# Patient Record
Sex: MALE | Race: WHITE | Employment: OTHER | URBAN - METROPOLITAN AREA
[De-identification: names, ages, dates, MRNs, and addresses within clinical notes are randomized per-mention and may not be internally consistent; named-entity substitution may affect disease eponyms.]

---

## 2019-09-10 ENCOUNTER — HOSPITAL ENCOUNTER (OUTPATIENT)
Age: 84
Discharge: HOME | End: 2019-09-12
Payer: COMMERCIAL

## 2019-09-10 LAB — PROSTATE SPECIFIC ANTIGEN: 9.42 NG/ML (ref 0–4)

## 2019-09-10 PROCEDURE — G0103 PSA SCREENING: HCPCS

## 2019-10-11 ENCOUNTER — HOSPITAL ENCOUNTER (OUTPATIENT)
Age: 84
Discharge: HOME | End: 2019-10-13
Payer: COMMERCIAL

## 2019-10-11 PROCEDURE — 87186 SC STD MICRODIL/AGAR DIL: CPT

## 2019-10-11 PROCEDURE — 87088 URINE BACTERIA CULTURE: CPT

## 2019-10-11 PROCEDURE — 87077 CULTURE AEROBIC IDENTIFY: CPT

## 2019-10-13 LAB
ORGANISM: ABNORMAL
URINE CULTURE, ROUTINE: ABNORMAL

## 2020-07-27 ENCOUNTER — APPOINTMENT (OUTPATIENT)
Dept: GENERAL RADIOLOGY | Age: 85
DRG: 291 | End: 2020-07-27
Payer: MEDICARE

## 2020-07-27 ENCOUNTER — APPOINTMENT (OUTPATIENT)
Dept: CT IMAGING | Age: 85
DRG: 291 | End: 2020-07-27
Payer: MEDICARE

## 2020-07-27 ENCOUNTER — HOSPITAL ENCOUNTER (INPATIENT)
Age: 85
LOS: 8 days | Discharge: NURSING, REHAB OR LONG-TERM CARE | DRG: 291 | End: 2020-08-05
Attending: EMERGENCY MEDICINE | Admitting: INTERNAL MEDICINE
Payer: MEDICARE

## 2020-07-27 PROBLEM — I50.9 HF (HEART FAILURE) (HCC): Status: ACTIVE | Noted: 2020-07-27

## 2020-07-27 LAB
ACETAMINOPHEN LEVEL: <5 MCG/ML (ref 10–30)
ALBUMIN SERPL-MCNC: 3.5 G/DL (ref 3.5–5.2)
ALBUMIN SERPL-MCNC: 3.7 G/DL (ref 3.5–5.2)
ALP BLD-CCNC: 65 U/L (ref 40–129)
ALP BLD-CCNC: 73 U/L (ref 40–129)
ALT SERPL-CCNC: 11 U/L (ref 0–40)
ALT SERPL-CCNC: 11 U/L (ref 0–40)
ANION GAP SERPL CALCULATED.3IONS-SCNC: 10 MMOL/L (ref 7–16)
ANION GAP SERPL CALCULATED.3IONS-SCNC: 14 MMOL/L (ref 7–16)
APTT: 29.5 SEC (ref 24.5–35.1)
AST SERPL-CCNC: 15 U/L (ref 0–39)
AST SERPL-CCNC: 19 U/L (ref 0–39)
BACTERIA: ABNORMAL /HPF
BASOPHILS ABSOLUTE: 0.01 E9/L (ref 0–0.2)
BASOPHILS ABSOLUTE: 0.02 E9/L (ref 0–0.2)
BASOPHILS RELATIVE PERCENT: 0.2 % (ref 0–2)
BASOPHILS RELATIVE PERCENT: 0.5 % (ref 0–2)
BILIRUB SERPL-MCNC: 1.3 MG/DL (ref 0–1.2)
BILIRUB SERPL-MCNC: 1.4 MG/DL (ref 0–1.2)
BILIRUBIN URINE: NEGATIVE
BLOOD, URINE: ABNORMAL
BUN BLDV-MCNC: 31 MG/DL (ref 8–23)
BUN BLDV-MCNC: 36 MG/DL (ref 8–23)
CALCIUM SERPL-MCNC: 10.3 MG/DL (ref 8.6–10.2)
CALCIUM SERPL-MCNC: 10.5 MG/DL (ref 8.6–10.2)
CHLORIDE BLD-SCNC: 101 MMOL/L (ref 98–107)
CHLORIDE BLD-SCNC: 99 MMOL/L (ref 98–107)
CK MB: 2.6 NG/ML (ref 0–7.7)
CLARITY: ABNORMAL
CO2: 26 MMOL/L (ref 22–29)
CO2: 28 MMOL/L (ref 22–29)
COLOR: YELLOW
CREAT SERPL-MCNC: 1.3 MG/DL (ref 0.7–1.2)
CREAT SERPL-MCNC: 1.4 MG/DL (ref 0.7–1.2)
D DIMER: 1521 NG/ML DDU
EOSINOPHILS ABSOLUTE: 0.23 E9/L (ref 0.05–0.5)
EOSINOPHILS ABSOLUTE: 0.24 E9/L (ref 0.05–0.5)
EOSINOPHILS RELATIVE PERCENT: 5.3 % (ref 0–6)
EOSINOPHILS RELATIVE PERCENT: 5.3 % (ref 0–6)
ETHANOL: <10 MG/DL (ref 0–0.08)
GFR AFRICAN AMERICAN: 58
GFR AFRICAN AMERICAN: >60
GFR NON-AFRICAN AMERICAN: 48 ML/MIN/1.73
GFR NON-AFRICAN AMERICAN: 52 ML/MIN/1.73
GLUCOSE BLD-MCNC: 90 MG/DL (ref 74–99)
GLUCOSE BLD-MCNC: 90 MG/DL (ref 74–99)
GLUCOSE URINE: NEGATIVE MG/DL
HCT VFR BLD CALC: 34.6 % (ref 37–54)
HCT VFR BLD CALC: 38.2 % (ref 37–54)
HEMOGLOBIN: 10.8 G/DL (ref 12.5–16.5)
HEMOGLOBIN: 12.2 G/DL (ref 12.5–16.5)
IMMATURE GRANULOCYTES #: 0.02 E9/L
IMMATURE GRANULOCYTES #: 0.02 E9/L
IMMATURE GRANULOCYTES %: 0.4 % (ref 0–5)
IMMATURE GRANULOCYTES %: 0.5 % (ref 0–5)
INR BLD: 1.2
KETONES, URINE: NEGATIVE MG/DL
LEUKOCYTE ESTERASE, URINE: ABNORMAL
LYMPHOCYTES ABSOLUTE: 0.86 E9/L (ref 1.5–4)
LYMPHOCYTES ABSOLUTE: 0.99 E9/L (ref 1.5–4)
LYMPHOCYTES RELATIVE PERCENT: 19.8 % (ref 20–42)
LYMPHOCYTES RELATIVE PERCENT: 21.9 % (ref 20–42)
MCH RBC QN AUTO: 27.6 PG (ref 26–35)
MCH RBC QN AUTO: 28.1 PG (ref 26–35)
MCHC RBC AUTO-ENTMCNC: 31.2 % (ref 32–34.5)
MCHC RBC AUTO-ENTMCNC: 31.9 % (ref 32–34.5)
MCV RBC AUTO: 88 FL (ref 80–99.9)
MCV RBC AUTO: 88.3 FL (ref 80–99.9)
MONOCYTES ABSOLUTE: 0.55 E9/L (ref 0.1–0.95)
MONOCYTES ABSOLUTE: 0.62 E9/L (ref 0.1–0.95)
MONOCYTES RELATIVE PERCENT: 12.6 % (ref 2–12)
MONOCYTES RELATIVE PERCENT: 13.7 % (ref 2–12)
NEUTROPHILS ABSOLUTE: 2.64 E9/L (ref 1.8–7.3)
NEUTROPHILS ABSOLUTE: 2.67 E9/L (ref 1.8–7.3)
NEUTROPHILS RELATIVE PERCENT: 58.5 % (ref 43–80)
NEUTROPHILS RELATIVE PERCENT: 61.3 % (ref 43–80)
NITRITE, URINE: POSITIVE
PDW BLD-RTO: 15.7 FL (ref 11.5–15)
PDW BLD-RTO: 15.9 FL (ref 11.5–15)
PH UA: 7 (ref 5–9)
PLATELET # BLD: 169 E9/L (ref 130–450)
PLATELET # BLD: 189 E9/L (ref 130–450)
PMV BLD AUTO: 8.7 FL (ref 7–12)
PMV BLD AUTO: 8.7 FL (ref 7–12)
POTASSIUM REFLEX MAGNESIUM: 4.1 MMOL/L (ref 3.5–5)
POTASSIUM REFLEX MAGNESIUM: 4.5 MMOL/L (ref 3.5–5)
PRO-BNP: 4876 PG/ML (ref 0–450)
PROCALCITONIN: 0.08 NG/ML (ref 0–0.08)
PROTEIN UA: ABNORMAL MG/DL
PROTHROMBIN TIME: 14 SEC (ref 9.3–12.4)
RBC # BLD: 3.92 E12/L (ref 3.8–5.8)
RBC # BLD: 4.34 E12/L (ref 3.8–5.8)
RBC UA: ABNORMAL /HPF (ref 0–2)
SALICYLATE, SERUM: <0.3 MG/DL (ref 0–30)
SODIUM BLD-SCNC: 139 MMOL/L (ref 132–146)
SODIUM BLD-SCNC: 139 MMOL/L (ref 132–146)
SPECIFIC GRAVITY UA: 1.01 (ref 1–1.03)
TOTAL PROTEIN: 6.3 G/DL (ref 6.4–8.3)
TOTAL PROTEIN: 6.7 G/DL (ref 6.4–8.3)
TRICYCLIC ANTIDEPRESSANTS SCREEN SERUM: NEGATIVE NG/ML
TROPONIN: 0.02 NG/ML (ref 0–0.03)
TROPONIN: 0.03 NG/ML (ref 0–0.03)
TSH SERPL DL<=0.05 MIU/L-ACNC: 1.88 UIU/ML (ref 0.27–4.2)
UROBILINOGEN, URINE: 1 E.U./DL
WBC # BLD: 4.4 E9/L (ref 4.5–11.5)
WBC # BLD: 4.5 E9/L (ref 4.5–11.5)
WBC UA: >20 /HPF (ref 0–5)

## 2020-07-27 PROCEDURE — 96376 TX/PRO/DX INJ SAME DRUG ADON: CPT

## 2020-07-27 PROCEDURE — 6370000000 HC RX 637 (ALT 250 FOR IP): Performed by: EMERGENCY MEDICINE

## 2020-07-27 PROCEDURE — 82553 CREATINE MB FRACTION: CPT

## 2020-07-27 PROCEDURE — 2580000003 HC RX 258: Performed by: STUDENT IN AN ORGANIZED HEALTH CARE EDUCATION/TRAINING PROGRAM

## 2020-07-27 PROCEDURE — 84443 ASSAY THYROID STIM HORMONE: CPT

## 2020-07-27 PROCEDURE — 6370000000 HC RX 637 (ALT 250 FOR IP): Performed by: STUDENT IN AN ORGANIZED HEALTH CARE EDUCATION/TRAINING PROGRAM

## 2020-07-27 PROCEDURE — 80053 COMPREHEN METABOLIC PANEL: CPT

## 2020-07-27 PROCEDURE — 99285 EMERGENCY DEPT VISIT HI MDM: CPT

## 2020-07-27 PROCEDURE — 73562 X-RAY EXAM OF KNEE 3: CPT

## 2020-07-27 PROCEDURE — 71045 X-RAY EXAM CHEST 1 VIEW: CPT

## 2020-07-27 PROCEDURE — 83880 ASSAY OF NATRIURETIC PEPTIDE: CPT

## 2020-07-27 PROCEDURE — 85378 FIBRIN DEGRADE SEMIQUANT: CPT

## 2020-07-27 PROCEDURE — 81001 URINALYSIS AUTO W/SCOPE: CPT

## 2020-07-27 PROCEDURE — 80307 DRUG TEST PRSMV CHEM ANLYZR: CPT

## 2020-07-27 PROCEDURE — 2580000003 HC RX 258: Performed by: RADIOLOGY

## 2020-07-27 PROCEDURE — 84484 ASSAY OF TROPONIN QUANT: CPT

## 2020-07-27 PROCEDURE — 2580000003 HC RX 258: Performed by: EMERGENCY MEDICINE

## 2020-07-27 PROCEDURE — 85730 THROMBOPLASTIN TIME PARTIAL: CPT

## 2020-07-27 PROCEDURE — 93005 ELECTROCARDIOGRAM TRACING: CPT | Performed by: EMERGENCY MEDICINE

## 2020-07-27 PROCEDURE — 74177 CT ABD & PELVIS W/CONTRAST: CPT

## 2020-07-27 PROCEDURE — 6360000004 HC RX CONTRAST MEDICATION: Performed by: RADIOLOGY

## 2020-07-27 PROCEDURE — G0378 HOSPITAL OBSERVATION PER HR: HCPCS

## 2020-07-27 PROCEDURE — 6360000002 HC RX W HCPCS: Performed by: EMERGENCY MEDICINE

## 2020-07-27 PROCEDURE — 85025 COMPLETE CBC W/AUTO DIFF WBC: CPT

## 2020-07-27 PROCEDURE — 36415 COLL VENOUS BLD VENIPUNCTURE: CPT

## 2020-07-27 PROCEDURE — 96375 TX/PRO/DX INJ NEW DRUG ADDON: CPT

## 2020-07-27 PROCEDURE — 85610 PROTHROMBIN TIME: CPT

## 2020-07-27 PROCEDURE — 96374 THER/PROPH/DIAG INJ IV PUSH: CPT

## 2020-07-27 PROCEDURE — 84145 PROCALCITONIN (PCT): CPT

## 2020-07-27 PROCEDURE — 6360000002 HC RX W HCPCS: Performed by: STUDENT IN AN ORGANIZED HEALTH CARE EDUCATION/TRAINING PROGRAM

## 2020-07-27 PROCEDURE — 71275 CT ANGIOGRAPHY CHEST: CPT

## 2020-07-27 PROCEDURE — G0480 DRUG TEST DEF 1-7 CLASSES: HCPCS

## 2020-07-27 RX ORDER — PANTOPRAZOLE SODIUM 40 MG/1
40 TABLET, DELAYED RELEASE ORAL
Status: DISCONTINUED | OUTPATIENT
Start: 2020-07-28 | End: 2020-08-05 | Stop reason: HOSPADM

## 2020-07-27 RX ORDER — FOLIC ACID 1 MG/1
1 TABLET ORAL DAILY
Status: DISCONTINUED | OUTPATIENT
Start: 2020-07-27 | End: 2020-08-05 | Stop reason: HOSPADM

## 2020-07-27 RX ORDER — FUROSEMIDE 10 MG/ML
20 INJECTION INTRAMUSCULAR; INTRAVENOUS ONCE
Status: COMPLETED | OUTPATIENT
Start: 2020-07-27 | End: 2020-07-27

## 2020-07-27 RX ORDER — SODIUM CHLORIDE 0.9 % (FLUSH) 0.9 %
10 SYRINGE (ML) INJECTION EVERY 12 HOURS SCHEDULED
Status: DISCONTINUED | OUTPATIENT
Start: 2020-07-27 | End: 2020-08-05 | Stop reason: HOSPADM

## 2020-07-27 RX ORDER — PROMETHAZINE HYDROCHLORIDE 25 MG/1
12.5 TABLET ORAL EVERY 6 HOURS PRN
Status: DISCONTINUED | OUTPATIENT
Start: 2020-07-27 | End: 2020-08-05 | Stop reason: HOSPADM

## 2020-07-27 RX ORDER — ASPIRIN 81 MG/1
81 TABLET ORAL DAILY
Status: DISCONTINUED | OUTPATIENT
Start: 2020-07-27 | End: 2020-08-05 | Stop reason: HOSPADM

## 2020-07-27 RX ORDER — ONDANSETRON 2 MG/ML
4 INJECTION INTRAMUSCULAR; INTRAVENOUS EVERY 6 HOURS PRN
Status: DISCONTINUED | OUTPATIENT
Start: 2020-07-27 | End: 2020-08-05 | Stop reason: HOSPADM

## 2020-07-27 RX ORDER — CEFTRIAXONE 1 G/1
1 INJECTION, POWDER, FOR SOLUTION INTRAMUSCULAR; INTRAVENOUS DAILY
Status: DISCONTINUED | OUTPATIENT
Start: 2020-07-28 | End: 2020-07-27 | Stop reason: SDUPTHER

## 2020-07-27 RX ORDER — DILTIAZEM HYDROCHLORIDE 180 MG/1
180 CAPSULE, COATED, EXTENDED RELEASE ORAL DAILY
Status: DISCONTINUED | OUTPATIENT
Start: 2020-07-27 | End: 2020-07-28

## 2020-07-27 RX ORDER — SODIUM CHLORIDE 0.9 % (FLUSH) 0.9 %
10 SYRINGE (ML) INJECTION PRN
Status: DISCONTINUED | OUTPATIENT
Start: 2020-07-27 | End: 2020-07-27 | Stop reason: SDUPTHER

## 2020-07-27 RX ORDER — LANOLIN ALCOHOL/MO/W.PET/CERES
1000 CREAM (GRAM) TOPICAL DAILY
Status: DISCONTINUED | OUTPATIENT
Start: 2020-07-27 | End: 2020-08-05 | Stop reason: HOSPADM

## 2020-07-27 RX ORDER — SODIUM CHLORIDE 0.9 % (FLUSH) 0.9 %
10 SYRINGE (ML) INJECTION PRN
Status: DISCONTINUED | OUTPATIENT
Start: 2020-07-27 | End: 2020-08-05 | Stop reason: HOSPADM

## 2020-07-27 RX ORDER — ROSUVASTATIN CALCIUM 5 MG/1
5 TABLET, COATED ORAL NIGHTLY
Status: DISCONTINUED | OUTPATIENT
Start: 2020-07-27 | End: 2020-08-05 | Stop reason: HOSPADM

## 2020-07-27 RX ORDER — DILTIAZEM HYDROCHLORIDE 60 MG/1
180 CAPSULE, EXTENDED RELEASE ORAL DAILY
Status: DISCONTINUED | OUTPATIENT
Start: 2020-07-27 | End: 2020-07-27 | Stop reason: SDUPTHER

## 2020-07-27 RX ORDER — POLYETHYLENE GLYCOL 3350 17 G/17G
17 POWDER, FOR SOLUTION ORAL DAILY PRN
Status: DISCONTINUED | OUTPATIENT
Start: 2020-07-27 | End: 2020-08-05 | Stop reason: HOSPADM

## 2020-07-27 RX ORDER — 0.9 % SODIUM CHLORIDE 0.9 %
1000 INTRAVENOUS SOLUTION INTRAVENOUS ONCE
Status: COMPLETED | OUTPATIENT
Start: 2020-07-27 | End: 2020-07-27

## 2020-07-27 RX ORDER — ACETAMINOPHEN 325 MG/1
650 TABLET ORAL EVERY 6 HOURS PRN
Status: DISCONTINUED | OUTPATIENT
Start: 2020-07-27 | End: 2020-08-05 | Stop reason: HOSPADM

## 2020-07-27 RX ORDER — ASPIRIN 81 MG/1
324 TABLET, CHEWABLE ORAL ONCE
Status: COMPLETED | OUTPATIENT
Start: 2020-07-27 | End: 2020-07-27

## 2020-07-27 RX ORDER — ACETAMINOPHEN 650 MG/1
650 SUPPOSITORY RECTAL EVERY 6 HOURS PRN
Status: DISCONTINUED | OUTPATIENT
Start: 2020-07-27 | End: 2020-08-05 | Stop reason: HOSPADM

## 2020-07-27 RX ORDER — TAMSULOSIN HYDROCHLORIDE 0.4 MG/1
0.4 CAPSULE ORAL DAILY
Status: DISCONTINUED | OUTPATIENT
Start: 2020-07-27 | End: 2020-08-05 | Stop reason: HOSPADM

## 2020-07-27 RX ADMIN — SODIUM CHLORIDE, PRESERVATIVE FREE 10 ML: 5 INJECTION INTRAVENOUS at 20:32

## 2020-07-27 RX ADMIN — FUROSEMIDE 20 MG: 10 INJECTION, SOLUTION INTRAMUSCULAR; INTRAVENOUS at 11:06

## 2020-07-27 RX ADMIN — CEFTRIAXONE SODIUM 1 G: 1 INJECTION, POWDER, FOR SOLUTION INTRAMUSCULAR; INTRAVENOUS at 11:07

## 2020-07-27 RX ADMIN — IOPAMIDOL 110 ML: 755 INJECTION, SOLUTION INTRAVENOUS at 10:04

## 2020-07-27 RX ADMIN — FUROSEMIDE 20 MG: 10 INJECTION, SOLUTION INTRAVENOUS at 18:42

## 2020-07-27 RX ADMIN — ROSUVASTATIN CALCIUM 5 MG: 5 TABLET, FILM COATED ORAL at 20:32

## 2020-07-27 RX ADMIN — ASPIRIN 81 MG CHEWABLE TABLET 324 MG: 81 TABLET CHEWABLE at 11:08

## 2020-07-27 RX ADMIN — SODIUM CHLORIDE 1000 ML: 9 INJECTION, SOLUTION INTRAVENOUS at 11:04

## 2020-07-27 RX ADMIN — LIDOCAINE HYDROCHLORIDE: 20 SOLUTION ORAL; TOPICAL at 18:42

## 2020-07-27 RX ADMIN — SODIUM CHLORIDE, PRESERVATIVE FREE 10 ML: 5 INJECTION INTRAVENOUS at 18:42

## 2020-07-27 RX ADMIN — ASPIRIN 81 MG: 81 TABLET, COATED ORAL at 18:42

## 2020-07-27 RX ADMIN — FOLIC ACID 1 MG: 1 TABLET ORAL at 18:42

## 2020-07-27 RX ADMIN — TAMSULOSIN HYDROCHLORIDE 0.4 MG: 0.4 CAPSULE ORAL at 18:42

## 2020-07-27 RX ADMIN — Medication 1000 MCG: at 18:42

## 2020-07-27 ASSESSMENT — ENCOUNTER SYMPTOMS
NAUSEA: 0
CONSTIPATION: 0
FACIAL SWELLING: 0
EYE PAIN: 0
VOMITING: 0
PHOTOPHOBIA: 0
CHEST TIGHTNESS: 0
ALLERGIC/IMMUNOLOGIC NEGATIVE: 1
ABDOMINAL PAIN: 0
SORE THROAT: 0
SHORTNESS OF BREATH: 1
EYE REDNESS: 0
DIARRHEA: 0

## 2020-07-27 ASSESSMENT — PAIN DESCRIPTION - DESCRIPTORS: DESCRIPTORS: PATIENT UNABLE TO DESCRIBE

## 2020-07-27 ASSESSMENT — PAIN SCALES - GENERAL
PAINLEVEL_OUTOF10: 0
PAINLEVEL_OUTOF10: 0
PAINLEVEL_OUTOF10: 10

## 2020-07-27 ASSESSMENT — PAIN DESCRIPTION - LOCATION: LOCATION: GENERALIZED

## 2020-07-27 NOTE — H&P
[facility]  Internal Medicine Residency Program  History and Physical    Patient:  Moreno Andujar 80 y.o. male MRN: [id number]     Date of Service: 7/27/2020    Hospital Day: 1      Chief complaint: SOB and lower legs edema. History of Present Illness   The patient is a 80 y.o. male with PMHx of Afib, HTN, HLD, anemia, lymphoma presented to the ED complains of SOB and legs edema. Patient reports exertional dyspnea over a month but worsening for last couple days. Patient states that he only can walk a few steps before get short of breath. Patient does not have to use pillow to prop himself up at night, he also denies SOB during the night. Patient does cough sometimes and brings up yellow sputum yesterday, he denies hemoptysis. This morning patient reports feeling fever but he did not have temperature at home. Patient also reports bilateral legs swelling for several months worsening recently. He has chest tightness for several months when he walks outside that relieved with rest. Also he reports felling dizzy when he sit up from the bed, no spinning sensation. Patient lost 20 pounds over 6 months. He also has black starry stool. He denies headache, palpitation, chills or changes in urinary habits. ED Course: Patient alert and awake but feeling tired, oriented x3. /51 mmHg, O2 saturation 95% on room air. Lab result: Sodium 139, potassium 4.5, bicarb 28, creatinine 1.4, BUN 36; ProBNP 4876, Troponin 0.03, CBC shows mild anemia of 10.8, no WBC elevated. - EKG: Atrial fibrillation; Prolonged QT  - CTA chest: Large right and moderate sized left pleural effusion, no sign of pulmonary emboli. - U/A: positive for nitrite, leukocytes, bacteria  ED Meds: Patient was given Rocephine 1g, Lasix 20 mg, aspirin 324mg. ED Fluids: Patient was given 1000 ml Normal saline.     Past Medical History:      Diagnosis Date    Anemia     Atrial fibrillation (HCC)     Hyperlipidemia     Hypertension  [facility] [address]     Renal failure, acute ([facility] [address]     Temporal arteritis ([facility] [address]        Past Surgical History:        Procedure Laterality Date    CATARACT REMOVAL Bilateral     CORONARY ANGIOPLASTY WITH STENT PLACEMENT  2009    HERNIA REPAIR Right     PTCA  7/28/2009    stent to mid LAD lesion    ROTATOR CUFF REPAIR Bilateral        Medications Prior to Admission:    Prior to Admission medications    Medication Sig Start Date End Date Taking? Authorizing Provider   esomeprazole (NEXIUM) 40 MG delayed release capsule Take 1 capsule by mouth every morning (before breakfast) 10/31/19   Kinza Flores MD   albuterol sulfate  (90 Base) MCG/ACT inhaler Inhale 2 puffs into the lungs 4 times daily as needed for Wheezing 10/31/19   Kinza Flores MD   rosuvastatin (CRESTOR) 10 MG tablet Take 5 mg by mouth daily. Indications: takes 1/2 twice a week    Historical Provider, MD   CINNAMON PO Take 1,000 mg by mouth daily. Historical Provider, MD   vitamin B-12 (CYANOCOBALAMIN) 1000 MCG tablet Take 1,000 mcg by mouth daily. Historical Provider, MD   folic acid (FOLVITE) 1 MG tablet Take 1 mg by mouth daily. Historical Provider, MD   salsalate (DISALCID) 750 MG tablet Take 750 mg by mouth 3 times daily. Historical Provider, MD   metoprolol (TOPROL-XL) 50 MG XL tablet Take 50 mg by mouth 2 times daily. Historical Provider, MD   WARFARIN SODIUM PO Take  by mouth daily. [facility]  Currently 3 mg    Historical Provider, MD   nitroGLYCERIN (NITROSTAT) 0.4 MG SL tablet Place 0.4 mg under the tongue every 5 minutes as needed for Chest pain. Historical Provider, MD   Coenzyme Q10 (COQ10 PO) Take 1 capsule by mouth daily. Historical Provider, MD   aspirin 81 MG EC tablet Take 81 mg by mouth daily. Historical Provider, MD   Omega-3 Fatty Acids (FISH OIL) 1000 MG CAPS Take 1,000 mg by mouth daily. Historical Provider, MD   tamsulosin (FLOMAX) 0.4 MG capsule Take 0.4 mg by mouth daily. no thyromegaly or thyroid nodules, no cervical lymphadenopathy  · Pulmonary/Chest: Decrease breath sound over lung base bilaterally  · Cardiovascular: normal rate Irregular rhythm, normal S1 and S2, no murmurs, rubs, clicks, or gallops  · Abdomen: soft, non-tender, non-distended, normal bowel sounds, no masses or organomegaly.  There is ulceration over sacral region  · Extremities: bilateral lower extremities 3+ edema  · Musculoskeletal: Tender on palpation over left calf  · Neurologic: reflexes normal and symmetric, no cranial nerve deficit, gait, coordination and speech normal   Labs and Imaging Studies   Basic Labs  Recent Labs     20  0848      K 4.5      CO2 28   BUN 36*   CREATININE 1.4*   GLUCOSE 90   CALCIUM 10.3*       Recent Labs     20  0848   WBC 4.5   RBC 3.92   HGB 10.8*   HCT 34.6*   MCV 88.3   MCH 27.6   MCHC 31.2*   RDW 15.9*      MPV 8.7       CBC:   Lab Results   Component Value Date    WBC 4.5 2020    RBC 3.92 2020    HGB 10.8 2020    HCT 34.6 2020    MCV 88.3 2020    RDW 15.9 2020     2020     CMP:  Lab Results   Component Value Date     2020    K 4.5 2020     2020    CO2 28 2020    BUN 36 2020    PROT 6.3 2020     U/A:  No components found for: Sepideh Butter, USPGRAV, UPH, UPROTEIN, UGLUCOSE, UKETONE, UBILI, UBLOOD, UNITRITE, UUROBIL, ULEUKEST, USQEPI, URENEPI, UWBC, URBC, Synchari, [place]  PT/INR:  No results found for: PTINR  PTT:  No results found for: APTT  HgBA1c:  No components found for: HGBA1C  Iron Studies:  No results found for: TIBC, FERRITIN  VITAMIN B12: No components found for: B12  FOLATE:  No results found for: FOLATE    Imaging Studies:     Xr Knee Left (3 Views)    Result Date: 2020  Patient MRN:  [id number] : 1931 Age: 80 years Gender: Male Order Date:  2020 8:30 AM EXAM: XR KNEE LEFT (3 VIEWS) INDICATION:  knEE pAIN knEE pAIN COMPARISON: None FINDINGS:  There is mild compartmental narrowing medially and small medially oriented spurs. There is mild degenerative changes patellofemoral compartment. There are no fractures. There is no joint effusion. Mild degenerative changes     Ct Abdomen Pelvis W Iv Contrast Additional Contrast? None    Result Date: 2020  Patient MRN:  [id number] : 1931 Age: 80 years Gender: Male Order Date:  2020 9:00 AM EXAM: CTA CHEST W CONTRAST, CT ABDOMEN PELVIS W IV CONTRAST Dosage: 2009 mGY-cm Contrast: 110 mL Isovue-370 3-D postprocessing performed INDICATION:  Pain Pain COMPARISON: None FINDINGS:  There are small mediastinal and paratracheal lymph nodes. There are small left hilar and right infrahilar nodes. There is a moderate amount of calcified plaque in the great vessels. There is a moderate amount of coronary arterial vascular plaque. There is moderate to large right and moderate left pleural effusions. There is no evidence of pulmonary emboli. There are some groundglass opacities in left upper lobe. There is moderate bibasilar infiltrates. There is some groundglass opacities in the right upper lobe. Liver is normal. There is a tiny peripheral cyst. There are gallstones. Spleen and pancreas are normal. There is a 3.1 cm lesion posteriorly left kidney with solid and cystic components. There is moderate atheromatous plaque in the abdominal aorta and iliac vessels. No abdominal or pelvic lymphadenopathy or fluid collections are noted. There may be some debris within the bladder. There is slight retrolisthesis of L2 with respect to L3 with degenerative changes. Large right and moderate sized left pleural effusion  and significant bibasilar infiltrates worse on the right. Findings are nonspecific but could reflect congestive heart failure. No evidence of pulmonary emboli. Some paratracheal and small mediastinal lymph nodes. Lesion posteriorly in the left kidney.  Although this could reflect a hemorrhagic cyst, a complex solid and cystic mass is not excluded. Recommend follow-up ultrasound. Low-attenuation zones in the bladder which could reflect debris. This can be further evaluated with ultrasound as well    Xr Chest Portable    Result Date: 2020  Patient MRN:  [id number] : 1931 Age: 80 years Gender: Male Order Date:  2020 8:30 AM EXAM: XR CHEST PORTABLE INDICATION:  sob sob COMPARISON: None FINDINGS:  Heart is mildly enlarged. There are moderate-sized bilateral pleural effusions. They are larger on the right. There are no obvious infiltrates. CHF     Cta Chest W Contrast    Result Date: 2020  Patient MRN:  [id number] : 1931 Age: 80 years Gender: Male Order Date:  2020 9:00 AM EXAM: CTA CHEST W CONTRAST, CT ABDOMEN PELVIS W IV CONTRAST Dosage: 2009 mGY-cm Contrast: 110 mL Isovue-370 3-D postprocessing performed INDICATION:  Pain Pain COMPARISON: None FINDINGS:  There are small mediastinal and paratracheal lymph nodes. There are small left hilar and right infrahilar nodes. There is a moderate amount of calcified plaque in the great vessels. There is a moderate amount of coronary arterial vascular plaque. There is moderate to large right and moderate left pleural effusions. There is no evidence of pulmonary emboli. There are some groundglass opacities in left upper lobe. There is moderate bibasilar infiltrates. There is some groundglass opacities in the right upper lobe. Liver is normal. There is a tiny peripheral cyst. There are gallstones. Spleen and pancreas are normal. There is a 3.1 cm lesion posteriorly left kidney with solid and cystic components. There is moderate atheromatous plaque in the abdominal aorta and iliac vessels. No abdominal or pelvic lymphadenopathy or fluid collections are noted. There may be some debris within the bladder. There is slight retrolisthesis of L2 with respect to L3 with degenerative changes.      Large right and moderate sized left pleural effusion  and significant bibasilar infiltrates worse on the right. Findings are nonspecific but could reflect congestive heart failure. No evidence of pulmonary emboli. Some paratracheal and small mediastinal lymph nodes. Lesion posteriorly in the left kidney. Although this could reflect a hemorrhagic cyst, a complex solid and cystic mass is not excluded. Recommend follow-up ultrasound. Low-attenuation zones in the bladder which could reflect debris. This can be further evaluated with ultrasound as well      EKG: atrial fibrillation, rate 97    Resident's Assessment and Plan     Maximiliano Both is a 80 y.o. male with PHMx of Afib, HTN, HLD, anemia, lymphoma presented to the ED complains of SOB and legs edema. Cardiology:   1. Exertional dyspnea 2/2 Acute on chronic congestive heart failure vs Acute MI vs Pulmonary emboli  - Patient has Hx of HTN, Afib with cardioversion present to the Ed with SOB, bilateral lower legs swelling.   - Bilateral pleural effusion on physical exam and on CTA chest.   - Pro BNP on admission is elevated ( 4876)   - Troponin on admission 0.03, EKG shows no ST changes  - CTA chest show no evidence of pulmonary emboli  · Start IV Lasix  · ECHO   · Follow vitals, Troponin trending, spO2, CXR, check TSH and Pro-BNP  2. Hx of Afib, currently on Diltiazem 180 mg and Eliquis daily. 3. Hyperlipidemia  - On rosuvastatin 5mg daily. Pulmonary:    1. Community acquired pneumonia  - Patient presents with symptoms of cough up yellow sputum, SOB, patient has low fever at home; physical exam and imaging shows bilateral pleural effusion.  - CBC shows WBC in normal range( 4.5). - Plan: recheck CBC with WBC count, check CRP, pro calcitonin, CXR  - Send blood culture, respiratory culture. - Start antibiotics according to the result. - Follow up vitals. Nephrology (Fluids/ Electrolytes & Nutrition):   - Creatinine 1.4 on admission  - Plan: Check urine electrolytes, check FeNa. Black starry stool  - Patient reports having black starry stools, CBC shows mild normocytic anemia  - Plan: POCT occult blood stool, Order iron studies.   - Hold apixaban for now. Hematology/ Oncology:    Hx of lymphoma  Infectious Disease:   UTI   - U/A shows positive for WBC, nitrites, bacteria  - Currently on rocephin 1g  - Send urine culture. Musculoskeletal:   1. Left calf pain likely DVT. - Patient has left calf pain, swelling of lower extremities. - Well scores of 2.  - Plan: Check D- Dimer. DVT ppx: Currently on Eliquis  GI ppx: Protonix       Sangita Montez MD, PGY-1   Attending physician: Dr. Jaziel Minaya. Senior Resident Statement  I have seen and examined the patient with the intern. I have discussed the case, including pertinent history and exam findings with the intern. I agree with the assessment, plan and orders as documented by the intern. I have also discussed the plan with the attending on call, Dr. Jaziel Minaya. Patient is presenting with SOB, dizziness and bilateral LE edema. His symptoms have been generally chronic but worsened over the last few days. Will do ischemic cardiac workup including complete echo and cardiology consult. He will need a tress test but will defer to Cardiology. Will diurese given his heart failure presentation and bilateral pleural effusion. Regarding his pleural effusion, will monitor response to diuresis, consult pulmonology for their opinion and further recommendation. Will follow his effusion with CXR. It might need to be drained and sent for analysis. He stated that he has a history of black stool, compliant on apixaban will hold for now pending further assessment for bleeding. Remainder of medical problems as per intern note above.       Miki Delacruz M.D., PGY 2    Attending physician: Dr. Maribel Ngo,

## 2020-07-27 NOTE — ED NOTES
Spoke to anabelle on admitted floorand pt assigned bed is in process of being cleaned.      Nimo Velazquez RN  07/27/20 [id number]

## 2020-07-27 NOTE — ED PROVIDER NOTES
ATTENDING PROVIDER ATTESTATION:     Dmitriy Nolasco presented to the emergency department for evaluation of Other (patient has multiple complaints for months. ... MULTIPLE, was supposed to get a upper and lower scope today but didn't go)   and was initially evaluated by the Medical Resident. See Original ED Note for H&P and ED course above. I have reviewed and discussed the case, including pertinent history (medical, surgical, family and social) and exam findings with the Medical Resident assigned to Dmitriy Nolasco. I have personally performed and/or participated in the history, exam, medical decision making, and procedures and agree with all pertinent clinical information and any additional changes or corrections are noted below in my assessment and plan. I have discussed this patient in detail with the resident, and provided the instruction and education,       I have reviewed my findings and recommendations with the assigned Medical Resident, Dmitriy Nolasco and members of family present at the time of disposition. I have performed a history and physical examination of this patient and directly supervised the resident caring for this patient      History of Present Illness:    Presents to the ED for multiple complaints, beginning years ago. The complaint has been persistent, moderate in severity, and worsened by nothing. Patient reports that he has had multiple complaints for many months to years. He reports that he has been tired, having orthopnea, having exertional dyspnea and becoming more tired. He reports he is supposed to have upper and lower scopes today but canceled because he did not feel well and was more tired. He reports low-grade temps at home as well. He denies chills. He denies any history of heart failure. He does report bilateral peripheral edema and swelling. He says he has had intermittent chest pain for months as well.         Review of Systems:   Pertinent positives and negatives are stated within HPI, all other systems reviewed and are negative.    --------------------------------------------- PAST HISTORY ---------------------------------------------  Past Medical History:  has a past medical history of Anemia, Atrial fibrillation ([facility] [address] Hyperlipidemia, Hypertension, Lymphoma ([facility] [address] Renal failure, acute ([facility] [address] and [facility] Past Surgical History:  has a past surgical history that includes Percutaneous Transluminal Coronary Angio (7/28/2009); Rotator cuff repair (Bilateral); Coronary angioplasty with stent (2009); Cataract removal (Bilateral); and hernia repair (Right). Social History:  reports that he quit smoking about 45 years ago. His smoking use included cigars. He quit after 25.00 years of use. He quit smokeless tobacco use about 47 years ago. He reports that he does not drink alcohol or use drugs. Family History: family history is not on file. Unless otherwise noted, family history is non contributory    The patients home medications have been reviewed. Allergies: Zocor [simvastatin]      Physical Exam:  Constitutional/General: Alert and oriented x3  Head: Normocephalic and atraumatic  Eyes: PERRL, EOMI, sclera non icteric  Mouth: Oropharynx clear, handling secretions  Neck: Supple, full ROM, no stridor, no meningeal signs  Respiratory: Lungs with decreased breath sounds bilaterally and rales bilaterally. Not in respiratory distress  Cardiovascular:  Irregularly irregular, No murmurs, no aortic murmurs, no gallops, or rubs. 2+ distal pulses. Equal extremity pulses. GI:  Abdomen Soft, Non tender, Non distended. No rebound, guarding, or rigidity. No pulsatile masses. Musculoskeletal: Moves all extremities x 4. Warm and well perfused,   1+ pitting edema bilaterally. Palpable peripheral pulses  Integument: skin warm and dry. No rashes.    Neurologic: GCS 15, no focal deficits  Psychiatric: Normal Affect      I directly supervised any procedures performed by the resident and was present for the procedure including all critical portions of the procedure      I, Dr. Thalia Loaiza, am the primary provider of record    My Medical Decision Making:          Labs and imaging suggestive of heart failure  He denies hx of this  This is likely why he is short of breath and and feeling poorly  Medicine consulted for admission        1.  Congestive heart failure, unspecified HF chronicity, unspecified heart failure type [facility])           Tono Torres MD  07/27/20 [id number]

## 2020-07-27 NOTE — ED PROVIDER NOTES
Patient is a 26-year-old male presented emergency department with fatigue, chest pain, lower extremity swelling, shortness of breath, left knee pain. Patient states his symptoms been worse in the last 2 weeks. Patient denies any fever, chills. He is tachycardic at a rate of 101 upon arrival with a blood pressure 115/51. He is satting 95% on room air. Symptoms are worsened by ambulating   symptoms are improved by nothing    Denies any associated nausea, vomiting, diarrhea    Review of Systems   Constitutional: Positive for fatigue. Negative for chills and fever. HENT: Negative for congestion, facial swelling, hearing loss and sore throat. Eyes: Negative for photophobia, pain and redness. Respiratory: Positive for shortness of breath. Negative for chest tightness. Cardiovascular: Positive for chest pain, palpitations and leg swelling. Gastrointestinal: Negative for abdominal pain, constipation, diarrhea, nausea and vomiting. Endocrine: Negative for cold intolerance, polydipsia and polyuria. Genitourinary: Negative for dysuria, flank pain and frequency. Musculoskeletal: Negative for arthralgias, joint swelling and myalgias. Skin: Negative. Allergic/Immunologic: Negative. Physical Exam  Vitals signs and nursing note reviewed. Constitutional:       Appearance: He is well-developed. He is ill-appearing. HENT:      Head: Normocephalic and atraumatic. Right Ear: Tympanic membrane normal.      Left Ear: Tympanic membrane normal.      Nose: Nose normal. No congestion. Mouth/Throat:      Mouth: Mucous membranes are moist.      Pharynx: Oropharynx is clear. Eyes:      Pupils: Pupils are equal, round, and reactive to light. Neck:      Musculoskeletal: Normal range of motion and neck supple. Cardiovascular:      Rate and Rhythm: Regular rhythm. Tachycardia present. Heart sounds: Normal heart sounds. No murmur.    Pulmonary:      Effort: Pulmonary effort is normal. No respiratory distress. Breath sounds: Wheezing and rhonchi present. No rales. Abdominal:      General: Bowel sounds are normal.      Palpations: Abdomen is soft. Tenderness: There is no abdominal tenderness. There is no guarding or rebound. Musculoskeletal:         General: Tenderness (Left knee pain) present. Right lower leg: Edema present. Left lower leg: Edema present. Skin:     General: Skin is warm and dry. Neurological:      Mental Status: He is alert and oriented to person, place, and time. Cranial Nerves: No cranial nerve deficit. Coordination: Coordination normal.          Procedures     EKG: This EKG is signed and interpreted by me. Rate: 97  Rhythm: Atrial fibrillation  Interpretation: atrial fibrillation (chronic)  Comparison: stable as compared to patient's most recent EKG       MDM  Number of Diagnoses or Management Options  Diagnosis management comments: Patient found to have new onset heart failure. He also has ongoing chest pains. CT imaging of the chest revealed bilateral pleural effusions. Patient denies any fever, chills does have a urinary tract infection which she will be treated with Rocephin.          --------------------------------------------- PAST HISTORY ---------------------------------------------  Past Medical History:  has a past medical history of Anemia, Atrial fibrillation ([facility] Hyperlipidemia, Hypertension, Lymphoma ([facility] Ren[facility] failure, acute [facility] and Temporal arteritis ([facility] Past Surgical History:  has a past surgical history that includes Percutaneous Transluminal Coronary Angio (7/28/2009); Rotator cuff repair (Bilateral); Coronary angioplasty with stent (2009); Cataract removal (Bilateral); and hernia repair (Right). Social History:  reports that he quit smoking about 45 years ago. His smoking use included cigars. He quit after 25.00 years of use. He quit smokeless tobacco use about 47 years ago.  He reports that he does not drink alcohol or use drugs. Family History: family history is not on file. The patients home medications have been reviewed.     Allergies: Zocor [simvastatin]    -------------------------------------------------- RESULTS -------------------------------------------------    LABS:  Results for orders placed or performed during the hospital encounter of 07/27/20   CBC Auto Differential   Result Value Ref Range    WBC 4.5 4.5 - 11.5 E9/L    RBC 3.92 3.80 - 5.80 E12/L    Hemoglobin 10.8 (L) 12.5 - 16.5 g/dL    Hematocrit 34.6 (L) 37.0 - 54.0 %    MCV 88.3 80.0 - 99.9 fL    MCH 27.6 26.0 - 35.0 pg    MCHC 31.2 (L) 32.0 - 34.5 %    RDW 15.9 (H) 11.5 - 15.0 fL    Platelets 314 325 - 467 E9/L    MPV 8.7 7.0 - 12.0 fL    Neutrophils % 58.5 43.0 - 80.0 %    Immature Granulocytes % 0.4 0.0 - 5.0 %    Lymphocytes % 21.9 20.0 - 42.0 %    Monocytes % 13.7 (H) 2.0 - 12.0 %    Eosinophils % 5.3 0.0 - 6.0 %    Basophils % 0.2 0.0 - 2.0 %    Neutrophils Absolute 2.64 1.80 - 7.30 E9/L    Immature Granulocytes # 0.02 E9/L    Lymphocytes Absolute 0.99 (L) 1.50 - 4.00 E9/L    Monocytes Absolute 0.62 0.10 - 0.95 E9/L    Eosinophils Absolute 0.24 0.05 - 0.50 E9/L    Basophils Absolute 0.01 0.00 - 0.20 E9/L   Comprehensive Metabolic Panel w/ Reflex to MG   Result Value Ref Range    Sodium 139 132 - 146 mmol/L    Potassium reflex Magnesium 4.5 3.5 - 5.0 mmol/L    Chloride 101 98 - 107 mmol/L    CO2 28 22 - 29 mmol/L    Anion Gap 10 7 - 16 mmol/L    Glucose 90 74 - 99 mg/dL    BUN 36 (H) 8 - 23 mg/dL    CREATININE 1.4 (H) 0.7 - 1.2 mg/dL    GFR Non-African American 48 >=60 mL/min/1.73    GFR African American 58     Calcium 10.3 (H) 8.6 - 10.2 mg/dL    Total Protein 6.3 (L) 6.4 - 8.3 g/dL    Alb 3.5 3.5 - 5.2 g/dL    Total Bilirubin 1.4 (H) 0.0 - 1.2 mg/dL    Alkaline Phosphatase 65 40 - 129 U/L    ALT 11 0 - 40 U/L    AST 15 0 - 39 U/L   Troponin   Result Value Ref Range    Troponin 0.03 0.00 - 0.03 ng/mL   Brain Natriuretic Peptide   Result Value Ref Range    Pro-BNP 4,876 (H) 0 - 450 pg/mL   Urinalysis, reflex to microscopic   Result Value Ref Range    Color, UA Yellow Straw/Yellow    Clarity, UA SL CLOUDY Clear    Glucose, Ur Negative Negative mg/dL    Bilirubin Urine Negative Negative    Ketones, Urine Negative Negative mg/dL    Specific Gravity, UA 1.015 1.005 - 1.030    Blood, Urine TRACE-INTACT Negative    pH, UA 7.0 5.0 - 9.0    Protein, UA TRACE Negative mg/dL    Urobilinogen, Urine 1.0 <2.0 E.U./dL    Nitrite, Urine POSITIVE (A) Negative    Leukocyte Esterase, Urine LARGE (A) Negative   Microscopic Urinalysis   Result Value Ref Range    WBC, UA >20 (A) 0 - 5 /HPF    RBC, UA 0-1 0 - 2 /HPF    Bacteria, UA MODERATE (A) None Seen /HPF   EKG 12 Lead   Result Value Ref Range    Ventricular Rate 97 BPM    Atrial Rate 227 BPM    QRS Duration 88 ms    Q-T Interval 380 ms    QTc Calculation (Bazett) 482 ms    T [place] 32 degrees       RADIOLOGY:  CTA CHEST W CONTRAST   Final Result   Large right and moderate sized left pleural effusion  and significant   bibasilar infiltrates worse on the right. Findings are nonspecific but   could reflect congestive heart failure. No evidence of pulmonary emboli. Some paratracheal and small mediastinal lymph nodes. Lesion posteriorly in the left kidney. Although this could reflect a   hemorrhagic cyst, a complex solid and cystic mass is not excluded. Recommend follow-up ultrasound. Low-attenuation zones in the bladder which could reflect debris. This   can be further evaluated with ultrasound as well      CT ABDOMEN PELVIS W IV CONTRAST Additional Contrast? None   Final Result   Large right and moderate sized left pleural effusion  and significant   bibasilar infiltrates worse on the right. Findings are nonspecific but   could reflect congestive heart failure. No evidence of pulmonary emboli. Some paratracheal and small mediastinal lymph nodes.       Lesion posteriorly in the left kidney. Although this could reflect a   hemorrhagic cyst, a complex solid and cystic mass is not excluded. Recommend follow-up ultrasound. Low-attenuation zones in the bladder which could reflect debris. This   can be further evaluated with ultrasound as well      XR CHEST PORTABLE   Final Result   CHF               XR KNEE LEFT (3 VIEWS)   Final Result   Mild degenerative changes                         ------------------------- NURSING NOTES AND VITALS REVIEWED ---------------------------  Date / Time Roomed:  7/27/2020  8:13 AM  ED Bed Assignment:  14B/14B-14    The nursing notes within the ED encounter and vital signs as below have been reviewed. Patient Vitals for the past 24 hrs:   BP Temp Temp src Pulse Resp SpO2   07/27/20 0811 (!) 115/51 97.3 °F (36.3 °C) Temporal 100 17 95 %   07/27/20 0755 -- 99.3 °F (37.4 °C) Tympanic 92 -- 92 %       Oxygen Saturation Interpretation: Normal    ------------------------------------------ PROGRESS NOTES ------------------------------------------  Re-evaluation(s):  Time: 5724  Patients symptoms show no change  Repeat physical examination is not changed    Counseling:  I have spoken with the patient and discussed todays results, in addition to providing specific details for the plan of care and counseling regarding the diagnosis and prognosis. Their questions are answered at this time and they are agreeable with the plan of admission.    --------------------------------- ADDITIONAL PROVIDER NOTES ---------------------------------  Consultations:  Time: 1106. Spoke with Dr. Sofiya Bowie. Discussed case. They will admit the patient.   This patient's ED course included: a personal history and physicial examination, re-evaluation prior to disposition, multiple bedside re-evaluations, IV medications, cardiac monitoring, continuous pulse oximetry and complex medical decision making and emergency management    This patient has remained hemodynamically stable during their ED course. Diagnosis:  1. Congestive heart failure, unspecified HF chronicity, unspecified heart failure type ([facility]        Disposition:  Patient's disposition: Admit to telemetry  Patient's condition is stable.                    Meg Ahumada, DO  Resident  07/27/20 1134

## 2020-07-27 NOTE — PLAN OF CARE
Problem:  Activity:  Goal: Capacity to carry out activities will improve  Description: Capacity to carry out activities will improve  Outcome: Met This Shift  Goal: Will verbalize the importance of balancing activity with adequate rest periods  Description: Will verbalize the importance of balancing activity with adequate rest periods  Outcome: Met This Shift     Problem: Cardiac:  Goal: Hemodynamic stability will improve  Description: Hemodynamic stability will improve  Outcome: Met This Shift  Goal: Ability to maintain an adequate cardiac output will improve  Description: Ability to maintain an adequate cardiac output will improve  Outcome: Met This Shift     Problem: Fluid Volume:  Goal: Risk for excess fluid volume will decrease  Description: Risk for excess fluid volume will decrease  Outcome: Met This Shift  Goal: Maintenance of adequate hydration will improve  Description: Maintenance of adequate hydration will improve  Outcome: Met This Shift  Goal: Will show no signs and symptoms of electrolyte imbalance  Description: Will show no signs and symptoms of electrolyte imbalance  Outcome: Met This Shift

## 2020-07-28 ENCOUNTER — APPOINTMENT (OUTPATIENT)
Dept: ULTRASOUND IMAGING | Age: 85
DRG: 291 | End: 2020-07-28
Payer: MEDICARE

## 2020-07-28 ENCOUNTER — APPOINTMENT (OUTPATIENT)
Dept: GENERAL RADIOLOGY | Age: 85
DRG: 291 | End: 2020-07-28
Payer: MEDICARE

## 2020-07-28 PROBLEM — I50.31 ACUTE DIASTOLIC HEART FAILURE WITH PRESERVED EJECTION FRACTION (HCC): Status: ACTIVE | Noted: 2020-07-28

## 2020-07-28 LAB
ADENOVIRUS BY PCR: NOT DETECTED
ALBUMIN SERPL-MCNC: 3.6 G/DL (ref 3.5–5.2)
ALP BLD-CCNC: 71 U/L (ref 40–129)
ALT SERPL-CCNC: 10 U/L (ref 0–40)
AMPHETAMINE SCREEN, URINE: NOT DETECTED
ANION GAP SERPL CALCULATED.3IONS-SCNC: 13 MMOL/L (ref 7–16)
AST SERPL-CCNC: 15 U/L (ref 0–39)
BARBITURATE SCREEN URINE: NOT DETECTED
BASOPHILS ABSOLUTE: 0.02 E9/L (ref 0–0.2)
BASOPHILS RELATIVE PERCENT: 0.4 % (ref 0–2)
BENZODIAZEPINE SCREEN, URINE: NOT DETECTED
BILIRUB SERPL-MCNC: 1 MG/DL (ref 0–1.2)
BORDETELLA PARAPERTUSSIS BY PCR: NOT DETECTED
BORDETELLA PERTUSSIS BY PCR: NOT DETECTED
BUN BLDV-MCNC: 32 MG/DL (ref 8–23)
CALCIUM SERPL-MCNC: 10 MG/DL (ref 8.6–10.2)
CANNABINOID SCREEN URINE: NOT DETECTED
CHLAMYDOPHILIA PNEUMONIAE BY PCR: NOT DETECTED
CHLORIDE BLD-SCNC: 98 MMOL/L (ref 98–107)
CHLORIDE URINE RANDOM: 113 MMOL/L
CK MB: 2.1 NG/ML (ref 0–7.7)
CO2: 27 MMOL/L (ref 22–29)
COCAINE METABOLITE SCREEN URINE: NOT DETECTED
CORONAVIRUS 229E BY PCR: NOT DETECTED
CORONAVIRUS HKU1 BY PCR: NOT DETECTED
CORONAVIRUS NL63 BY PCR: NOT DETECTED
CORONAVIRUS OC43 BY PCR: NOT DETECTED
CREAT SERPL-MCNC: 1.4 MG/DL (ref 0.7–1.2)
CREATININE URINE: 40 MG/DL (ref 40–278)
EKG ATRIAL RATE: 227 BPM
EKG ATRIAL RATE: 89 BPM
EKG Q-T INTERVAL: 340 MS
EKG Q-T INTERVAL: 380 MS
EKG QRS DURATION: 72 MS
EKG QRS DURATION: 88 MS
EKG QTC CALCULATION (BAZETT): 468 MS
EKG QTC CALCULATION (BAZETT): 482 MS
EKG R AXIS: 11 DEGREES
EKG T AXIS: 28 DEGREES
EKG T AXIS: 32 DEGREES
EKG VENTRICULAR RATE: 114 BPM
EKG VENTRICULAR RATE: 97 BPM
EOSINOPHILS ABSOLUTE: 0.25 E9/L (ref 0.05–0.5)
EOSINOPHILS RELATIVE PERCENT: 5.4 % (ref 0–6)
FENTANYL SCREEN, URINE: NOT DETECTED
GFR AFRICAN AMERICAN: 58
GFR NON-AFRICAN AMERICAN: 48 ML/MIN/1.73
GLUCOSE BLD-MCNC: 110 MG/DL (ref 74–99)
HCT VFR BLD CALC: 35.4 % (ref 37–54)
HEMOGLOBIN: 11.4 G/DL (ref 12.5–16.5)
HUMAN METAPNEUMOVIRUS BY PCR: NOT DETECTED
HUMAN RHINOVIRUS/ENTEROVIRUS BY PCR: NOT DETECTED
IMMATURE GRANULOCYTES #: 0.02 E9/L
IMMATURE GRANULOCYTES %: 0.4 % (ref 0–5)
INFLUENZA A BY PCR: NOT DETECTED
INFLUENZA B BY PCR: NOT DETECTED
IRON SATURATION: 20 % (ref 20–55)
IRON: 37 MCG/DL (ref 59–158)
LV EF: 63 %
LVEF MODALITY: NORMAL
LYMPHOCYTES ABSOLUTE: 0.89 E9/L (ref 1.5–4)
LYMPHOCYTES RELATIVE PERCENT: 19.3 % (ref 20–42)
Lab: NORMAL
MCH RBC QN AUTO: 27.6 PG (ref 26–35)
MCHC RBC AUTO-ENTMCNC: 32.2 % (ref 32–34.5)
MCV RBC AUTO: 85.7 FL (ref 80–99.9)
METHADONE SCREEN, URINE: NOT DETECTED
MONOCYTES ABSOLUTE: 0.55 E9/L (ref 0.1–0.95)
MONOCYTES RELATIVE PERCENT: 11.9 % (ref 2–12)
MYCOPLASMA PNEUMONIAE BY PCR: NOT DETECTED
NEUTROPHILS ABSOLUTE: 2.88 E9/L (ref 1.8–7.3)
NEUTROPHILS RELATIVE PERCENT: 62.6 % (ref 43–80)
OPIATE SCREEN URINE: NOT DETECTED
OXYCODONE URINE: NOT DETECTED
PARAINFLUENZA VIRUS 1 BY PCR: NOT DETECTED
PARAINFLUENZA VIRUS 2 BY PCR: NOT DETECTED
PARAINFLUENZA VIRUS 3 BY PCR: NOT DETECTED
PARAINFLUENZA VIRUS 4 BY PCR: NOT DETECTED
PDW BLD-RTO: 15.6 FL (ref 11.5–15)
PHENCYCLIDINE SCREEN URINE: NOT DETECTED
PLATELET # BLD: 192 E9/L (ref 130–450)
PMV BLD AUTO: 9 FL (ref 7–12)
POTASSIUM REFLEX MAGNESIUM: 4.3 MMOL/L (ref 3.5–5)
POTASSIUM, UR: 22.2 MMOL/L
RBC # BLD: 4.13 E12/L (ref 3.8–5.8)
RESPIRATORY SYNCYTIAL VIRUS BY PCR: NOT DETECTED
SODIUM BLD-SCNC: 138 MMOL/L (ref 132–146)
SODIUM URINE: 129 MMOL/L
TOTAL IRON BINDING CAPACITY: 185 MCG/DL (ref 250–450)
TOTAL PROTEIN: 6.2 G/DL (ref 6.4–8.3)
TROPONIN: 0.01 NG/ML (ref 0–0.03)
TROPONIN: 0.03 NG/ML (ref 0–0.03)
WBC # BLD: 4.6 E9/L (ref 4.5–11.5)

## 2020-07-28 PROCEDURE — 2580000003 HC RX 258: Performed by: STUDENT IN AN ORGANIZED HEALTH CARE EDUCATION/TRAINING PROGRAM

## 2020-07-28 PROCEDURE — 82570 ASSAY OF URINE CREATININE: CPT

## 2020-07-28 PROCEDURE — 99205 OFFICE O/P NEW HI 60 MIN: CPT | Performed by: INTERNAL MEDICINE

## 2020-07-28 PROCEDURE — 99024 POSTOP FOLLOW-UP VISIT: CPT | Performed by: INTERNAL MEDICINE

## 2020-07-28 PROCEDURE — 2140000000 HC CCU INTERMEDIATE R&B

## 2020-07-28 PROCEDURE — 36415 COLL VENOUS BLD VENIPUNCTURE: CPT

## 2020-07-28 PROCEDURE — 6360000002 HC RX W HCPCS: Performed by: STUDENT IN AN ORGANIZED HEALTH CARE EDUCATION/TRAINING PROGRAM

## 2020-07-28 PROCEDURE — 85025 COMPLETE CBC W/AUTO DIFF WBC: CPT

## 2020-07-28 PROCEDURE — 6370000000 HC RX 637 (ALT 250 FOR IP): Performed by: STUDENT IN AN ORGANIZED HEALTH CARE EDUCATION/TRAINING PROGRAM

## 2020-07-28 PROCEDURE — 6370000000 HC RX 637 (ALT 250 FOR IP): Performed by: INTERNAL MEDICINE

## 2020-07-28 PROCEDURE — 99223 1ST HOSP IP/OBS HIGH 75: CPT | Performed by: INTERNAL MEDICINE

## 2020-07-28 PROCEDURE — 87088 URINE BACTERIA CULTURE: CPT

## 2020-07-28 PROCEDURE — 80053 COMPREHEN METABOLIC PANEL: CPT

## 2020-07-28 PROCEDURE — 84133 ASSAY OF URINE POTASSIUM: CPT

## 2020-07-28 PROCEDURE — 0100U HC RESPIRPTHGN MULT REV TRANS & AMP PRB TECH 21 TRGT: CPT

## 2020-07-28 PROCEDURE — 93306 TTE W/DOPPLER COMPLETE: CPT

## 2020-07-28 PROCEDURE — 82553 CREATINE MB FRACTION: CPT

## 2020-07-28 PROCEDURE — 83540 ASSAY OF IRON: CPT

## 2020-07-28 PROCEDURE — 84484 ASSAY OF TROPONIN QUANT: CPT

## 2020-07-28 PROCEDURE — 2580000003 HC RX 258: Performed by: RADIOLOGY

## 2020-07-28 PROCEDURE — 71045 X-RAY EXAM CHEST 1 VIEW: CPT

## 2020-07-28 PROCEDURE — 93970 EXTREMITY STUDY: CPT

## 2020-07-28 PROCEDURE — 96376 TX/PRO/DX INJ SAME DRUG ADON: CPT

## 2020-07-28 PROCEDURE — 80307 DRUG TEST PRSMV CHEM ANLYZR: CPT

## 2020-07-28 PROCEDURE — 83550 IRON BINDING TEST: CPT

## 2020-07-28 PROCEDURE — 6360000002 HC RX W HCPCS: Performed by: INTERNAL MEDICINE

## 2020-07-28 PROCEDURE — 93005 ELECTROCARDIOGRAM TRACING: CPT | Performed by: STUDENT IN AN ORGANIZED HEALTH CARE EDUCATION/TRAINING PROGRAM

## 2020-07-28 PROCEDURE — 82436 ASSAY OF URINE CHLORIDE: CPT

## 2020-07-28 PROCEDURE — 93010 ELECTROCARDIOGRAM REPORT: CPT | Performed by: INTERNAL MEDICINE

## 2020-07-28 PROCEDURE — 84300 ASSAY OF URINE SODIUM: CPT

## 2020-07-28 PROCEDURE — APPSS45 APP SPLIT SHARED TIME 31-45 MINUTES: Performed by: PHYSICIAN ASSISTANT

## 2020-07-28 RX ORDER — FUROSEMIDE 10 MG/ML
20 INJECTION INTRAMUSCULAR; INTRAVENOUS 2 TIMES DAILY
Status: DISCONTINUED | OUTPATIENT
Start: 2020-07-28 | End: 2020-07-30

## 2020-07-28 RX ORDER — METOPROLOL SUCCINATE 25 MG/1
25 TABLET, EXTENDED RELEASE ORAL 2 TIMES DAILY
Status: DISCONTINUED | OUTPATIENT
Start: 2020-07-28 | End: 2020-07-29

## 2020-07-28 RX ORDER — DILTIAZEM HYDROCHLORIDE 60 MG/1
60 CAPSULE, EXTENDED RELEASE ORAL 2 TIMES DAILY
Status: DISCONTINUED | OUTPATIENT
Start: 2020-07-28 | End: 2020-07-28

## 2020-07-28 RX ORDER — FUROSEMIDE 10 MG/ML
40 INJECTION INTRAMUSCULAR; INTRAVENOUS ONCE
Status: COMPLETED | OUTPATIENT
Start: 2020-07-28 | End: 2020-07-28

## 2020-07-28 RX ORDER — FUROSEMIDE 10 MG/ML
20 INJECTION INTRAMUSCULAR; INTRAVENOUS ONCE
Status: DISCONTINUED | OUTPATIENT
Start: 2020-07-28 | End: 2020-07-28

## 2020-07-28 RX ORDER — POTASSIUM CHLORIDE 20 MEQ/1
20 TABLET, EXTENDED RELEASE ORAL 2 TIMES DAILY WITH MEALS
Status: DISCONTINUED | OUTPATIENT
Start: 2020-07-28 | End: 2020-08-05 | Stop reason: HOSPADM

## 2020-07-28 RX ADMIN — METOPROLOL SUCCINATE 25 MG: 25 TABLET, EXTENDED RELEASE ORAL at 16:54

## 2020-07-28 RX ADMIN — PANTOPRAZOLE SODIUM 40 MG: 40 TABLET, DELAYED RELEASE ORAL at 06:26

## 2020-07-28 RX ADMIN — FUROSEMIDE 40 MG: 10 INJECTION, SOLUTION INTRAMUSCULAR; INTRAVENOUS at 11:31

## 2020-07-28 RX ADMIN — SODIUM CHLORIDE, PRESERVATIVE FREE 10 ML: 5 INJECTION INTRAVENOUS at 11:35

## 2020-07-28 RX ADMIN — Medication 1000 MCG: at 09:49

## 2020-07-28 RX ADMIN — TAMSULOSIN HYDROCHLORIDE 0.4 MG: 0.4 CAPSULE ORAL at 09:48

## 2020-07-28 RX ADMIN — ASPIRIN 81 MG: 81 TABLET, COATED ORAL at 09:49

## 2020-07-28 RX ADMIN — FOLIC ACID 1 MG: 1 TABLET ORAL at 09:48

## 2020-07-28 RX ADMIN — SODIUM CHLORIDE, PRESERVATIVE FREE 10 ML: 5 INJECTION INTRAVENOUS at 09:51

## 2020-07-28 RX ADMIN — CEFTRIAXONE SODIUM 1 G: 1 INJECTION, POWDER, FOR SOLUTION INTRAMUSCULAR; INTRAVENOUS at 09:49

## 2020-07-28 RX ADMIN — ROSUVASTATIN CALCIUM 5 MG: 5 TABLET, FILM COATED ORAL at 22:43

## 2020-07-28 RX ADMIN — SODIUM CHLORIDE, PRESERVATIVE FREE 10 ML: 5 INJECTION INTRAVENOUS at 22:42

## 2020-07-28 RX ADMIN — POTASSIUM CHLORIDE 20 MEQ: 1500 TABLET, EXTENDED RELEASE ORAL at 22:43

## 2020-07-28 RX ADMIN — FUROSEMIDE 20 MG: 10 INJECTION, SOLUTION INTRAMUSCULAR; INTRAVENOUS at 22:43

## 2020-07-28 ASSESSMENT — PAIN DESCRIPTION - LOCATION
LOCATION: GENERALIZED;BACK;NECK
LOCATION: LEG

## 2020-07-28 ASSESSMENT — PAIN DESCRIPTION - DESCRIPTORS
DESCRIPTORS: CONSTANT;ACHING
DESCRIPTORS: ACHING;CONSTANT

## 2020-07-28 ASSESSMENT — PAIN DESCRIPTION - PAIN TYPE
TYPE: CHRONIC PAIN
TYPE: CHRONIC PAIN

## 2020-07-28 ASSESSMENT — PAIN - FUNCTIONAL ASSESSMENT
PAIN_FUNCTIONAL_ASSESSMENT: PREVENTS OR INTERFERES SOME ACTIVE ACTIVITIES AND ADLS
PAIN_FUNCTIONAL_ASSESSMENT: PREVENTS OR INTERFERES WITH MANY ACTIVE NOT PASSIVE ACTIVITIES

## 2020-07-28 ASSESSMENT — PAIN DESCRIPTION - ONSET
ONSET: ON-GOING
ONSET: ON-GOING

## 2020-07-28 ASSESSMENT — PAIN DESCRIPTION - PROGRESSION
CLINICAL_PROGRESSION: NOT CHANGED
CLINICAL_PROGRESSION: NOT CHANGED

## 2020-07-28 ASSESSMENT — PAIN DESCRIPTION - FREQUENCY
FREQUENCY: CONTINUOUS
FREQUENCY: CONTINUOUS

## 2020-07-28 ASSESSMENT — PAIN SCALES - GENERAL
PAINLEVEL_OUTOF10: 5
PAINLEVEL_OUTOF10: 8

## 2020-07-28 NOTE — CONSULTS
Pulmonary [facility]                             Pulmonary Consult/Progress Note :          Patient: Maximiliano Wilde  MRN: [id number]  : 1931      Date of Admission: .2020  8:13 AM    Consulting Physician:Bilateral Pleural effusion       Reason for Consultation:bilateral effusion   CC : sob   HPI:   Maximiliano Wilde is a 80y.o. year old male with PMHx of Afib,  lymphoma presented to the ED complains of SOB and legs edema. HE state he has been  exertional dyspnea over a month but worsening for last couple days. Patient states that he only can walk a few steps before get short of breath. He quit smoking 40 years ago and he smoke for more than 30 year and has about 30 PPY smoking history. Patient does not have to use pillow to prop himself up at night, he also denies SOB during the night. Patient does cough sometimes and brings up yellow sputum yesterday, he denies hemoptysis. This morning patient reports feeling fever but he did not have temperature at home. Patient also reports bilateral legs swelling for several months worsening recently. He has chest tightness for several months when he walks outside that relieved with rest. Also he reports felling dizzy when he sit up from the bed, no spinning sensation. Patient lost 20 pounds over 6 months. He also has black starry stool. He denies headache, palpitation, chills or changes in urinary habits.      ED Course: Patient alert and awake but feeling tired, oriented x3. /51 mmHg, O2 saturation 95% on room air. Lab result: Sodium 139, potassium 4.5, bicarb 28, creatinine 1.4, BUN 36; ProBNP 4876, Troponin 0.03, CBC shows mild anemia of 10.8, no WBC elevated. - EKG: Atrial fibrillation; Prolonged QT  - CTA chest: Large right and moderate sized left pleural effusion, no sign of pulmonary emboli.    - U/A: positive for nitrite, leukocytes, bacteria  ED Meds: Patient was given Rocephine 1g, Lasix 20 mg, aspirin 324mg. ED Fluids: Patient was given 1000 ml Normal saline. PAST MEDICAL HISTORY:   Past Medical History:   Diagnosis Date    Anemia     Atrial fibrillation ([facility]     Hyperlipidemia     Hypertension     Lymphoma ([facility]     Renal failure, acute ([facility]     Temporal arteritis ([facility]        PAST SURGICAL HISTORY:   Past Surgical History:   Procedure Laterality Date    CATARACT REMOVAL Bilateral     CORONARY ANGIOPLASTY WITH STENT PLACEMENT      HERNIA REPAIR Right     PTCA  2009    stent to mid LAD lesion    ROTATOR CUFF REPAIR Bilateral        FAMILY HISTORY:   No family history on file.     SOCIAL HISTORY:   Social History     Socioeconomic History    Marital status: Legally      Spouse name: Not on file    Number of children: Not on file    Years of education: Not on file    Highest education level: Not on file   Occupational History    Not on file   Social Needs    Financial resource strain: Not on file    Food insecurity     Worry: Not on file     Inability: Not on file    Transportation needs     Medical: Not on file     Non-medical: Not on file   Tobacco Use    Smoking status: Former Smoker     Years: 25.00     Types: Cigars     Last attempt to quit: 10/10/1974     Years since quittin.8    Smokeless tobacco: Former User     Quit date: 10/10/1972    Tobacco comment: 20 Cigars daily   Substance and Sexual Activity    Alcohol use: No     Comment: quit     Drug use: No    Sexual activity: Not on file   Lifestyle    Physical activity     Days per week: Not on file     Minutes per session: Not on file    Stress: Not on file   Relationships    Social connections     Talks on phone: Not on file     Gets together: Not on file     Attends Buddhist service: Not on file     Active member of club or organization: Not on file     Attends meetings of clubs or organizations: Not on file     Relationship status: Not on file    Intimate partner violence     Fear of current or ex partner: Not on file     Emotionally abused: Not on file     Physically abused: Not on file     Forced sexual activity: Not on file   Other Topics Concern    Not on file   Social History Narrative    Not on file     Social History     Tobacco Use   Smoking Status Former Smoker    Years: 25.00    Types: Cigars    Last attempt to quit: 10/10/1974    Years since quittin.8   Smokeless Tobacco Former User    Quit date: 10/10/1972   Tobacco Comment    20 Cigars daily     Social History     Substance and Sexual Activity   Alcohol Use No    Comment: quit      Social History     Substance and Sexual Activity   Drug Use No       OCCUPATIONAL HISTORY:            HOME MEDICATIONS:  Prior to Admission medications    Medication Sig Start Date End Date Taking? Authorizing Provider   esomeprazole (NEXIUM) 40 MG delayed release capsule Take 1 capsule by mouth every morning (before breakfast) 10/31/19  Yes Ramsey Reeves MD   rosuvastatin (CRESTOR) 10 MG tablet Take 5 mg by mouth daily. Indications: takes 1/2 twice a week   Yes Historical Provider, MD   vitamin B-12 (CYANOCOBALAMIN) 1000 MCG tablet Take 1,000 mcg by mouth daily. Yes Historical Provider, MD   folic acid (FOLVITE) 1 MG tablet Take 1 mg by mouth daily. Yes Historical Provider, MD   metoprolol (TOPROL-XL) 50 MG XL tablet Take 50 mg by mouth 2 times daily. Yes Historical Provider, MD   aspirin 81 MG EC tablet Take 81 mg by mouth daily. Yes Historical Provider, MD   tamsulosin (FLOMAX) 0.4 MG capsule Take 0.4 mg by mouth daily. Yes Historical Provider, MD   albuterol sulfate  (90 Base) MCG/ACT inhaler Inhale 2 puffs into the lungs 4 times daily as needed for Wheezing 10/31/19   Ramsey Reeves MD   CINNAMON PO Take 1,000 mg by mouth daily. Historical Provider, MD   salsalate (DISALCID) 750 MG tablet Take 750 mg by mouth 3 times daily. Historical Provider, MD   WARFARIN SODIUM PO Take  by mouth daily.  [facility] Currently 3 mg    Historical Provider, MD   nitroGLYCERIN (NITROSTAT) 0.4 MG SL tablet Place 0.4 mg under the tongue every 5 minutes as needed for Chest pain. Historical Provider, MD   Coenzyme Q10 (COQ10 PO) Take 1 capsule by mouth daily. Historical Provider, MD   Omega-3 Fatty Acids (FISH OIL) 1000 MG CAPS Take 1,000 mg by mouth daily.     Historical Provider, MD       CURRENT MEDICATIONS:  Current Facility-Administered Medications: metoprolol succinate (TOPROL XL) extended release tablet 25 mg, 25 mg, Oral, BID  perflutren lipid microspheres (DEFINITY) injection 1.65 mg, 1.5 mL, Intravenous, ONCE PRN  sodium chloride flush 0.9 % injection 10 mL, 10 mL, Intravenous, PRN  aspirin EC tablet 81 mg, 81 mg, Oral, Daily  pantoprazole (PROTONIX) tablet 40 mg, 40 mg, Oral, QAM AC  folic acid (FOLVITE) tablet 1 mg, 1 mg, Oral, Daily  rosuvastatin (CRESTOR) tablet 5 mg, 5 mg, Oral, Nightly  tamsulosin (FLOMAX) capsule 0.4 mg, 0.4 mg, Oral, Daily  vitamin B-12 (CYANOCOBALAMIN) tablet 1,000 mcg, 1,000 mcg, Oral, Daily  sodium chloride flush 0.9 % injection 10 mL, 10 mL, Intravenous, 2 times per day  acetaminophen (TYLENOL) tablet 650 mg, 650 mg, Oral, Q6H PRN **OR** acetaminophen (TYLENOL) suppository 650 mg, 650 mg, Rectal, Q6H PRN  polyethylene glycol (GLYCOLAX) packet 17 g, 17 g, Oral, Daily PRN  promethazine (PHENERGAN) tablet 12.5 mg, 12.5 mg, Oral, Q6H PRN **OR** ondansetron (ZOFRAN) injection 4 mg, 4 mg, Intravenous, Q6H PRN  cefTRIAXone (ROCEPHIN) 1 g in sterile water 10 mL IV syringe, 1 g, Intravenous, Q24H    IV MEDICATIONS:      ALLERGIES:  Allergies   Allergen Reactions    Zocor [Simvastatin] Other (See Comments)     Caused muscle aches       REVIEW OF SYSTEMS:  General ROS:  No weight loss ,no fatigue     ENT ROS:   No Sore throat ,no lymphoadenopathy,no nasal stuffiness     Hematological and Lymphatic ROS:   No ecchymosis ,no tendency to bleed  Respiratory ROS:   SOB   Cardiovascular ROS:   No CP,No Palpitation   Gastrointestinal ROS:   No Gi bleed,no nausea or vomiting      - Musculoskeletal ROS:      - no joint swelling ,no joint pain   Neurological ROS:     -no weakness or numbness    Dermatological ROS:   No skin rash ,no urticaria     PHYSICAL EXAMINATION:     VITAL SIGNS:  /74   Pulse 136   Temp 98.4 °F (36.9 °C) (Temporal)   Resp 16   Ht 6' (1.829 m)   Wt 213 lb (96.6 kg)   SpO2 98%   BMI 28.89 kg/m²   Wt Readings from Last 3 Encounters:   07/27/20 213 lb (96.6 kg)   10/31/19 220 lb (99.8 kg)   10/14/19 224 lb (101.6 kg)     Temp Readings from Last 3 Encounters:   07/28/20 98.4 °F (36.9 °C) (Temporal)     TMAX:  BP Readings from Last 3 Encounters:   07/28/20 116/74   10/31/19 118/60   10/14/19 120/68     Pulse Readings from Last 3 Encounters:   07/28/20 136   10/31/19 78   10/14/19 100           INTAKE/OUTPUTS:  I/O last 3 completed shifts:   In: 5 [P.O.:720]  Out: 3725 [Urine:3725]    Intake/Output Summary (Last 24 hours) at 7/28/2020 1656  Last data filed at 7/28/2020 1436  Gross per 24 hour   Intake 720 ml   Output 3725 ml   Net -3005 ml       General Appearance: alert and oriented to person, place and time, well-developed and   well-nourished, in no acute distress   Eyes: pupils equal, round, and reactive to light, extraocular eye movements intact, conjunctivae normal and sclera anicteric   Neck: neck supple and non tender without mass, no thyromegaly, no thyroid nodules and no cervical adenopathy   Pulmonary/Chest:decrease braeth sound bilateral    Cardiovascular: normal rate, regular rhythm, normal S1 and S2, no murmurs, rubs, clicks or gallops, distal pulses intact, no carotid bruits, no murmurs, no gallops, no carotid bruits and no JVD   Abdomen: obese, soft, non-tender, non-distended, normal bowel sounds, no masses or organomegaly   Extremities no edema   Musculoskeletal: normal range of motion, no joint swelling, deformity or tenderness   Neurologic: reflexes normal and symmetric, effusion Right more than left   2.)Medistinal adenopathy   3. )CHF   4.)Possible COPD   5. )VESTA/      PLAN:  *-will drain fluid when 24 h after you stop elquis 48 h  *-will send for flow cytometry  *-diuresis per primary  *-BD   *_NIMV if in any distress    To me seems CHF and need diuresis and lymph nodes can be related ,but given his history of lymphoma . will need to to proceed with EBUS or PET scan as OP         Thank you very much for allowing me to participate in the care of this pleasant patient , should you have any questions ,please do not hesitate to contact me      Dmitry Murguia MD,[facility]  Pulmonary&Critical Care Medicine   Director of [address] [place] [address] Director of [address] [place] [address]    Sarai Sales    NOTE: This report was transcribed using voice recognition software. Every effort was made to ensure accuracy; however, inadvertent computerized transcription errors may be present.

## 2020-07-28 NOTE — PROGRESS NOTES
Dr. Ashu Scruggs notified via perfect serve regarding new consult, added to care team at this time.     Iman Diaz RN

## 2020-07-28 NOTE — PROGRESS NOTES
[address] [facility] [address] [facility] notified via perfect serve regarding patient urine output since administration of Iv Lasix.     Hayes Tavarez RN

## 2020-07-28 NOTE — CARE COORDINATION
7/28 Update CM Note: Daughter to investigate skilled facilities in [place] for patient. Her choices in no order are [address] [facility] in [facility] in [place]. PT/OT aster pending. Will send referrals for evaluation and determination for skilled.  DESHAUN ROLON

## 2020-07-28 NOTE — PROGRESS NOTES
[facility]  Internal Medicine Residency Program  Progress Note - House Team 2    Patient:  Joan Omalley 80 y.o. male MRN: [id number]     Date of Service: 7/28/2020     CC: Sob and leg edema  Overnight events: Patient does not sleep well. This morning patient has 1 episode of hemoptysis. Patient states not having bowel movement for 3 days. Subjective     Patient feels SOB, O2 saturation 98% on room air. Patient denies chest pain, headache, palpitation, abdominal pain. Patient still has soreness over left calf and bilateral legs swelling. Objective     Physical Exam:  · Vitals: /75   Pulse 120   Temp 98.4 °F (36.9 °C) (Temporal)   Resp 16   Ht 6' (1.829 m)   Wt 213 lb (96.6 kg)   SpO2 98%   BMI 28.89 kg/m²     I & O - 24hr: I/O this shift: In: 480 [P.O.:480]  · Out: 1125 [Urine:1125]   · General Appearance: alert, appears stated age, cooperative and fatigued  · HEENT:  Head: Normocephalic, no lesions, without obvious abnormality. · Neck: no adenopathy, no carotid bruit, no JVD, supple, symmetrical, trachea midline and thyroid not enlarged, symmetric, no tenderness/mass/nodules  · Lung: diminished breath sounds bilaterally  · Heart: regular rate and rhythm, S1, S2 normal, no murmur, click, rub or gallop  · Abdomen: soft, non-tender; bowel sounds normal; no masses,  no organomegaly  · Extremities:  edema 2+ bilaterally  · Musculokeletal: No joint swelling, no muscle tenderness. ROM normal in all joints of extremities.    · Neurologic: Mental status: Alert, oriented, thought content appropriate  Subject  Pertinent Labs & Imaging Studies   rosemary  CBC with Differential:    Lab Results   Component Value Date    WBC 4.6 07/28/2020    RBC 4.13 07/28/2020    HGB 11.4 07/28/2020    HCT 35.4 07/28/2020     07/28/2020    MCV 85.7 07/28/2020    MCH 27.6 07/28/2020    MCHC 32.2 07/28/2020    RDW 15.6 07/28/2020    LYMPHOPCT 19.3 07/28/2020    MONOPCT 11.9 07/28/2020    BASOPCT 0.4 07/28/2020    MONOSABS 0.55 07/28/2020    LYMPHSABS 0.89 07/28/2020    EOSABS 0.25 07/28/2020    BASOSABS 0.02 07/28/2020     CMP:    Lab Results   Component Value Date     07/28/2020    K 4.3 07/28/2020    CL 98 07/28/2020    CO2 27 07/28/2020    BUN 32 07/28/2020    CREATININE 1.4 07/28/2020    GFRAA 58 07/28/2020    LABGLOM 48 07/28/2020    GLUCOSE 110 07/28/2020    PROT 6.2 07/28/2020    LABALBU 3.6 07/28/2020    CALCIUM 10.0 07/28/2020    BILITOT 1.0 07/28/2020    ALKPHOS 71 07/28/2020    AST 15 07/28/2020    ALT 10 07/28/2020     BUN/Creatinine:    Lab Results   Component Value Date    BUN 32 07/28/2020    CREATININE 1.4 07/28/2020     Albumin:    Lab Results   Component Value Date    LABALBU 3.6 07/28/2020     Ionized Calcium:  No results found for: IONCA  Magnesium:  No results found for: MG  Last 3 Troponin:    Lab Results   Component Value Date    TROPONINI 0.01 07/28/2020    TROPONINI 0.03 07/28/2020    TROPONINI 0.02 07/27/2020     U/A:    Lab Results   Component Value Date    COLORU Yellow 07/27/2020    PROTEINU TRACE 07/27/2020    PHUR 7.0 07/27/2020    WBCUA >20 07/27/2020    RBCUA 0-1 07/27/2020    BACTERIA MODERATE 07/27/2020    CLARITYU SL CLOUDY 07/27/2020    SPECGRAV 1.015 07/27/2020    LEUKOCYTESUR LARGE 07/27/2020    UROBILINOGEN 1.0 07/27/2020    BILIRUBINUR Negative 07/27/2020    BLOODU TRACE-INTACT 07/27/2020    GLUCOSEU Negative 07/27/2020     IRON:    Lab Results   Component Value Date    IRON 37 07/28/2020     Iron Saturation:  No components found for: PERCENTFE  TIBC:    Lab Results   Component Value Date    TIBC 185 07/28/2020     FERRITIN:  No results found for: FERRITIN    Resident's Assessment and Plan     Clarke Xie is a 80 y.o. male with PHMx of Afib, HTN, HLD, anemia, lymphoma presented to the ED complains of SOB and legs edema. CTA chest significant for bilateral pleural effusion, no evidence of PE. Troponin negative, Pro BNP elevated 4687.  Patient has 1 episode of hemoptysis this morning and also has Hx of black starry stool. 1. Exertional dyspnea 2/2 Acute on chronic congestive heart failure vs Acute MI vs Pulmonary emboli  - Patient has Hx of HTN, Afib with cardioversion present to the Ed with SOB, bilateral lower legs swelling.   - Bilateral pleural effusion on physical exam and on CTA chest.   - Pro BNP on admission is elevated ( 4876)   - Troponin on admission 0.03, EKG shows no ST changes. Patient has troponin negative x3 today  - CTA chest show no evidence of pulmonary emboli  - Plan today:   · Lasix 40mg IV. · Follow I/O, CXR, vitals. · Will follow up per cardiology. 2. Community acquired pneumonia  - Patient presents with symptoms of cough up yellow sputum, SOB, patient has low fever at home; physical exam and imaging shows bilateral pleural effusion.  - CBC shows WBC in normal range( 4.5). - Plan: recheck CBC with WBC count, check CRP, pro calcitonin, CXR  - Respiratory panel result negative, waiting for blood and res culture. - Start antibiotics according to the result. - Follow up vitals. Today: Patient has 1 episode of hemoptysis  - Hold eliquis and Pulmonology consulted regarding hemoptysis and pleural effusion.  - Follow up with Pulmonology  3. Nephrology (Fluids/ Electrolytes & Nutrition):   - Creatinine 1.4 x3   - Plan: Continue follow BMP, I/O.      4. Black starry stool  - Patient reports having black starry stools, CBC shows mild normocytic anemia  - Plan: POCT occult blood stool, Order iron studies.   - Iron studies result: Iron 37, TIBC 185.   - Hold apixaban for now. 5. Hx of Afib cardioverted, currently on Diltiazem 180 mg and Eliquis daily. 6. Hyperlipidemia  - On rosuvastatin 5mg daily. 7. Hematology/ Oncology:    Hx of lymphoma     8. UTI   - U/A shows positive for WBC, nitrites, bacteria  - Currently on rocephin 1g  - Send urine culture. 9. Musculoskeletal:   1. Left calf pain likely DVT.    - Patient has left calf pain, swelling of lower extremities.   - Well scores of 2.  - Plan: Check D- Dimer.      DVT ppx: Hold Eliquis  GI ppx: Protonix          Marcela Baltazar MD, PGY-1  Attending physician: Dr. Belem Brown DO.

## 2020-07-28 NOTE — PLAN OF CARE
Problem: Activity:  Goal: Capacity to carry out activities will improve  Description: Capacity to carry out activities will improve  7/27/2020 2209 by Herman Mason RN  Outcome: Met This Shift     Problem:  Activity:  Goal: Will verbalize the importance of balancing activity with adequate rest periods  Description: Will verbalize the importance of balancing activity with adequate rest periods  7/27/2020 2209 by Herman Mason RN  Outcome: Met This Shift     Problem: Cardiac:  Goal: Hemodynamic stability will improve  Description: Hemodynamic stability will improve  7/27/2020 2209 by Herman Mason RN  Outcome: Met This Shift     Problem: Cardiac:  Goal: Ability to maintain an adequate cardiac output will improve  Description: Ability to maintain an adequate cardiac output will improve  7/27/2020 2209 by Herman Mason RN  Outcome: Met This Shift     Problem: Fluid Volume:  Goal: Risk for excess fluid volume will decrease  Description: Risk for excess fluid volume will decrease  7/27/2020 2209 by Herman Mason RN  Outcome: Met This Shift     Problem: Fluid Volume:  Goal: Maintenance of adequate hydration will improve  Description: Maintenance of adequate hydration will improve  7/27/2020 2209 by Herman Mason RN  Outcome: Met This Shift     Problem: Fluid Volume:  Goal: Will show no signs and symptoms of electrolyte imbalance  Description: Will show no signs and symptoms of electrolyte imbalance  7/27/2020 2209 by Herman Mason RN  Outcome: Met This Shift

## 2020-07-28 NOTE — CONSULTS
I independently interviewed and examined the patient. I have reviewed the above documentation completed by the MALINA. Please see my additional contributions to the HPI, physical exam, and assessment / medical decision making. Reason for consult: CHF and A. Fib    He was previously known to Dr. Venkata Wesley and was also seen by Dr. Edward Rivas at [facility]) cardiology in 2016 and was also following at [facility]. Mr. Emelina Perez is a 40-year-old gentleman with history of permanent atrial fibrillation, cardioverted in 2005, 2006 and 2014, currently on Eliquis for anticoagulation, status post loop recorder in situ, CAD, status post PCI/LYN to mid LAD underwent 2009, hypertension, hyperlipidemia, non-Hodgkin's lymphoma, history of syncope, TIA, GERD/Ponce's esophagus and BPH who presented to the hospital with a 6-week history of fatigue and progressively increasing dyspnea. He had a Lexiscan nuclear stress test at [facility] in June 2018 which showed an EF of 55% inferior wall hypokinesis without ischemia. Transthoracic echo in June 2019 showed an EF of 55+/-5% moderate LVH, severe left atrial enlargement, mild MR and TR and moderate aortic regurgitation. He presented to the hospital with progressively increasing dyspnea and fatigue for the past 6 weeks but denies any orthopnea and paroxysmal nocturnal dyspnea. He also noticed bilateral leg edema and no significant weight gain. In fact, I he lost 25 pounds over the last 6 months. He also noticed black stools for the past 1 month and coughed up bright red blood today. His EKG showed atrial fibrillation with heart rate in the 90s. He had a CTA chest that was negative for PE but it showed large right and moderate sized left pleural effusion and significant bibasilar infiltrates worse on the right. Findings nonspecific but could reflect congestive heart failure also. No evidence of pulmonary embolism. Small mediastinal and paratracheal lymph nodes noted.   There is severely dilated, dilated aorta, moderate aortic insufficiency, mild MR and TR. Moderate concentric LVH. Per [facility] nuclear stress test-June 2018 ([facility]): LVEF 52%, there is resting perfusion abnormality in the inferior septal wall consistent with a prior infarction without any reversible perfusion defect. Inferior wall hypokinesis. Assessment:  · Shortness of breath multifactorial including diastolic heart failure, large bilateral pleural effusions. · Atrial fibrillation, permanent with rate control  · CAD status post PCI/LYN to mid LAD underwent 2009, now with history of inferior scar based on stress test done in 2018 at [facility]. · Hemoptysis, unclear etiology. · Hypertension, well controlled  · Hyperlipidemia on statin  · History of non-Hodgkin's lymphoma  · History of TIA  · GERD  · BPH  · UTI  · Melena rule out GI bleed with mild anemia      Plan:   · Will hold Eliquis for now due to hemoptysis  · Will btain an echocardiogram to assess LV function  · Obtain pulmonary consult due to hemoptysis and bilateral pleural effusion  · We will give him 1 dose of Lasix 40 mg IV and assess urine output for the next 2 hours and will decide about further diuretic management based on urine output. · Management of urine tract infection as per primary service  · Will obtain lower extremity Dopplers to rule out DVT. · We will also get a COVID-19 to rule out COVID-19 pneumonia. · Consider GI consult to evaluate GI source of blood loss due to melena and mild anemia. · Further recommendations will based on echo results. Thank you for consulting us and will be following with you for any further recommendations. Rubens Henry MD, Dennis Mckeon.   [facility]) Cardiology

## 2020-07-28 NOTE — PROGRESS NOTES
[facility]  Internal Medicine Residency Program  Progress Note - House Team    Patient:  Hortencia Hays 80 y.o. male MRN: [id number]     Date of Service: 7/28/2020     CC:worsening dyspnea and chest tightness with exertion over the past week    Overnight events: None reported. Subjective     Patient seen and examined at the bedside. He reported that he is not feeling much better today. Patient endorses cough, SOB, and chest tightness that have not improved since yesterday. Additionally, he has new onset hemoptysis. Patient reports appetite is improving and he finished his entire breakfast this morning. Denies nausea or vomiting. Denies any dysuria or hematuria but continues to endorse increased frequency that was present prior to admission. Denies fevers, chills, chest pain or palpitations. Patient reports he is unable to walk, and requests a walker. Objective     Physical Exam:  · Vitals: /75   Pulse 120   Temp 98.4 °F (36.9 °C) (Temporal)   Resp 16   Ht 6' (1.829 m)   Wt 213 lb (96.6 kg)   SpO2 98%   BMI 28.89 kg/m²     · I & O - 24hr: I/O this shift:  · In: 240 [P.O.:240]  · Out: 725 [Urine:725]   · General Appearance: alert, appears stated age, cooperative and no distress  · HEENT:  Head: Normocephalic, no lesions, without obvious abnormality. · Neck: no JVD and supple, symmetrical, trachea midline  · Lung: clear to auscultation bilaterally and diminished breath sounds bibasilar  · Heart: regular rate and rhythm, S1, S2 normal, no murmur, click, rub or gallop  · Abdomen: soft, non-tender; bowel sounds normal; no masses,  no organomegaly  · Extremities: Bilateral LE edema improved from yesterday, present to just above the ankle bilaterally. No pitting edema above the ankles. No pain to palpation with either calf. edema in bilateral legs below the ankles. Improved from yesterday. · Musculokeletal: Bilateral knee pain worse on the left.   · Neurologic: Mental status: Alert, oriented, thought content appropriate\"}  Subject  Pertinent Labs & Imaging Studies   rosemary  CBC with Differential:    Lab Results   Component Value Date    WBC 4.6 07/28/2020    RBC 4.13 07/28/2020    HGB 11.4 07/28/2020    HCT 35.4 07/28/2020     07/28/2020    MCV 85.7 07/28/2020    MCH 27.6 07/28/2020    MCHC 32.2 07/28/2020    RDW 15.6 07/28/2020    LYMPHOPCT 19.3 07/28/2020    MONOPCT 11.9 07/28/2020    BASOPCT 0.4 07/28/2020    MONOSABS 0.55 07/28/2020    LYMPHSABS 0.89 07/28/2020    EOSABS 0.25 07/28/2020    BASOSABS 0.02 07/28/2020     CMP:    Lab Results   Component Value Date     07/28/2020    K 4.3 07/28/2020    CL 98 07/28/2020    CO2 27 07/28/2020    BUN 32 07/28/2020    CREATININE 1.4 07/28/2020    GFRAA 58 07/28/2020    LABGLOM 48 07/28/2020    GLUCOSE 110 07/28/2020    PROT 6.2 07/28/2020    LABALBU 3.6 07/28/2020    CALCIUM 10.0 07/28/2020    BILITOT 1.0 07/28/2020    ALKPHOS 71 07/28/2020    AST 15 07/28/2020    ALT 10 07/28/2020     Last 3 Troponin:    Lab Results   Component Value Date    TROPONINI 0.01 07/28/2020    TROPONINI 0.03 07/28/2020    TROPONINI 0.02 07/27/2020     U/A:    Lab Results   Component Value Date    COLORU Yellow 07/27/2020    PROTEINU TRACE 07/27/2020    PHUR 7.0 07/27/2020    WBCUA >20 07/27/2020    RBCUA 0-1 07/27/2020    BACTERIA MODERATE 07/27/2020    CLARITYU SL CLOUDY 07/27/2020    SPECGRAV 1.015 07/27/2020    LEUKOCYTESUR LARGE 07/27/2020    UROBILINOGEN 1.0 07/27/2020    BILIRUBINUR Negative 07/27/2020    BLOODU TRACE-INTACT 07/27/2020    GLUCOSEU Negative 07/27/2020     IRON:    Lab Results   Component Value Date    IRON 37 07/28/2020     Iron Saturation:  No components found for: PERCENTFE  TIBC:    Lab Results   Component Value Date    TIBC 185 07/28/2020       Resident's Assessment and Plan     Nereyda Le is a 80 y.o. male  w/ chief complaint of exertional SOB and chest tightness for 5 weeks that has progressed over the past week.     1. Exertional Dyspnea secondary to acute on chronic HF exacerbation   -Patient has received 2 doses of IV lasix 20mg     -resulting in decreased lower extremity swelling bilaterally. Will continue with IV diuresis. -Cardiology consult: give 1 dose of Lasix 40 mg IV and assess urine output for the next 2 hours. decide about  further diuretic management based on urine output. -ECHO 07/28-results pending   -elevated pro-BNP 4,876  2. Bilateral pleural effusions   -most likely secondary to HF exacerbation with CKD having an additive effect   -Pulmonology consult: consider for thoracentesis  3. R/o CAP   -Patient remaining afebrile and WBC steady at 4.6.     -Viral respiratory panel negative. -CRP, procalcitonin   -Blood and respiratory cultures sent.   -Will start Abx acccording to result. 4.CKD stage IIIa   -Urine electrolytes: Na 129, K 22.2, Cl 113, Cr 40   -Bun and creatinine elevated but stable at 36 and 1.4 respectively. 5. Hx afib:    Discussions to reduce home dose of diltiazem from 180 to 160mg. Elliquis continued to be held pending Pulmonologist input for thoracentesis. Can consider cardizem drip if patient remains in afib and elloquis must be held. 6. Urinary Tract Infection:   UA was positive for nitrites, large leuk esterase   Microscopic UA = WBC > 20, mod bacteria   Rocephin 1g (day 2)   Urine Cx pendng    7. Left calf pain in setting of imobility and SOB   Wells score 3 with new onset hempotysis   D-dimer+elevated 1521  8. HLD   -Continue rosuvastatin   9.  Reported melena   -Anemia panel-demonstrating anemia of chronic disease pattern: Iron decreased 37, TIBC  decreased 185,  Iron sat 20%   -Cards recommended GI consult    PT/OT evaluation:  DVT prophylaxis/ GI prophylaxis: Eliquis held, begin SCV/ Protonix  Disposition: home +/- home health / [facility] / [facility] / Shane Melo, Jose Elias Pete  Attending physician: Dr. Merrick Alicea

## 2020-07-28 NOTE — PROGRESS NOTES
[facility]  Internal Medicine Residency Program  Progress Note - House Team    Patient:  Zoë Gallardo 80 y.o. male MRN: [id number]     Date of Service: 7/28/2020     CC: had concerns including Other (patient has multiple complaints for months. ... MULTIPLE, was supposed to get a upper and lower scope today but didn't go). Overnight events: ***     Subjective     Patient seen and examined in am. ***    Objective     Physical Exam:  · Vitals: /75   Pulse 120   Temp 98.4 °F (36.9 °C) (Temporal)   Resp 16   Ht 6' (1.829 m)   Wt 213 lb (96.6 kg)   SpO2 98%   BMI 28.89 kg/m²     · I & O - 24hr: I/O this shift:  · In: 240 [P.O.:240]  · Out: 725 [Urine:725]   · General Appearance: {Exam; general:12332::\"alert\",\"appears stated age\",\"cooperative\"}  · HEENT:  {Exam; heent:92186}  · Neck: {neck exam:09669::\"no adenopathy\",\"no carotid bruit\",\"no JVD\",\"supple, symmetrical, trachea midline\",\"thyroid not enlarged, symmetric, no tenderness/mass/nodules\"}  · Lung: {lung exam:53847::\"clear to auscultation bilaterally\"}  · Heart: {heart exam:5510::\"regular rate and rhythm, S1, S2 normal, no murmur, click, rub or gallop\"}  · Abdomen: {abdominal exam:98993::\"soft, non-tender; bowel sounds normal; no masses,  no organomegaly\"}  · Extremities:  {extremity exam:5109::\"extremities normal, atraumatic, no cyanosis or edema\"}  · Musculokeletal: No joint swelling, no muscle tenderness. ROM normal in all joints of extremities.    · Neurologic: Mental status: {Exam; neuro mental status:20662::\"Alert, oriented, thought content appropriate\"}  Subject  Pertinent Labs & Imaging Studies   rosemary  {VPZY:[id number]}    Resident's Assessment and Plan     Zoë Gallardo is a 80 y.o. male    1.***   -  2.***   -  3.***   -  4.***   -  5.***   -    PT/OT evaluation:  DVT prophylaxis/ GI prophylaxis:   Disposition: home +/- home health / [facility] / [place] [address] / Clara Salazar, PGY-1  Attending physician: Dr. Zaria Mckay

## 2020-07-28 NOTE — CONSULTS
Inpatient Cardiology Consultation      Reason for Consult:  CHF/PAF    Consulting Physician: Dr. Eliane Payan    Requesting Physician:  Dr. Rajeev Watt    Date of Consultation: 7/28/2020    HISTORY OF PRESENT ILLNESS:    This is a very pleasant 80year old gentleman who previously followed with Dr. Yael Baron and [facility] cardiology. He has a known medical history of permanent Afib (previous Coumadin which was changed to Eliquis 8 months ago per patient), CAD (s/p LYN mid LAD 7/09), HTN, hyperlipidemia, GERD/Ponce's esophagus, temporal arteritis, non-Hodgkin's lymphoma, and BPH. Patient presented to ED 7/27/2020 due to increasing SOB and fatigue over the past 6 weeks. He admits to mild orthopnea, moist cough with yellow sputum, increasing LE edema. He denies any chest pain or palpitations. He admits to left calf pain recently. Patient reports a weight loss of 25 pounds over the past 6 months and does admit to diminished oral intake. He admits that he only eats one meal a day (bologna sandwich daily). Recently, he has had intermittent dark tarry stool. He admits to chronic constipation. This morning, he coughed up bright red blood clot. Upon arrival to ED, /51, EKG noted Afib rate 97, Ddimer 1521, CTA chest ruled out PE but did note large R pleural effusion and moderate left pleural effusion with groundglass opacities, bibasilar infiltrates, Na 139, K 4.5, BUN 36, creatinine 1.4, TSH 1.880, pBNP 4876, troponin 0.03, INR 1.2, WBC 4.5, H/H 10.8/34. 6. His Eliquis has been placed on hold. Echocardiogram is pending. Please note: past medical records were reviewed per electronic medical record (EMR) - see detailed reports under Past Medical/ Surgical History.    Past Medical History:    Past Medical History:   Diagnosis Date    Anemia     Atrial fibrillation ([address]     Hyperlipidemia     Hypertension     Lymphoma ([address]     Renal failure, acute ([address]     Temporal arteritis (HCC)      · permanent Afib (previous Coumadin which was changed to Eliquis 8 months ago per patient); hx DCCV 5/06 and 12/14  · S/p LINQ recorder placed by [facility]   · CAD (s/p LYN mid LAD 7/09)  · HTN  · Hyperlipidemia  · GERD/Ponce's esophagus  · temporal arteritis  · non-Hodgkin's lymphoma  · BPH    Echo 6/19 [facility]:  CONCLUSIONS:  - Exam indication: Sustained atrial fibrillation  - The left ventricle is normal in size. There is moderate septal left ventricular   hypertrophy. Left ventricular systolic function is normal. EF = 55 ± 5% (visual   est.) Left ventricular diastolic function was not evaluated due to AF. - The right ventricle is normal in size. Right ventricular systolic function is   normal.  - The left atrial cavity is severely dilated. - The right atrial cavity is dilated. - The visualized aorta is dilated with a maximal dimension of 4.9 cm.  - There is moderate (2+) aortic valve regurgitation. Left coronary cusp appears   immobile. Aortic valve mean gradient 11 mm hg. Gradients obtained at HR= 98bpm.  - The patient has not had a prior CC echocardiographic exam for comparison. Consider CT aorta if clinically indicated to further evaluate aorta. Lexiscan nuclear stress 6/18 [facility]: EF 52%. Marked inf hypokinesis, no ischemia      His medical history includes: Body mass index is 28.89 kg/m². 1. Allergies to Zocor. 2. Temporal arteritis. 3. Hypertension. 4. PAF first diagnosed in 2005. 5. RAHEEL, 08/2005. Normal LV size and function. No significant valvular heart disease. No atrial appendage thrombus. 6. Successful DCCV, 08/2005. 7. Echo, 2005. EF 64%. Mildly elevated RVSP. No valvulopathy. 8. Admission, 05/2206, AF, RVR. RAHEEL demonstrated no LA thrombus. 9. Successful DCCV with 200 joules of direct current, 05/2006. Discharged on Rythmol and Coumadin. 10. Chronic renal insufficiency. Baseline Cr 1.7-1.8. 11. Exercise TMT, 08/2007. Tavo protocol. 4.5 minutes. 100% age [facility]. No chest pain, EKG changes or arrhythmias. Perfusion images, small size, equivocally abnormal, lateral reversible defect. Small in size, mild in severity. EF 46%. 12. Echo, 01/2009. Moderate LAE. EF 62%. No valve pathology other than mild AI. Aorta measured at 4.1 cm. 13. Low grade non-Hodgkin's B cell lymphoma, Stage IV, involving the spleen and bone marrow. Follows Dr. Cruz Cerna. Beck 3 admission, 05/2009. Sepsis. Type 2 nonSTEMI with TnI as high as 3. . 14. Persantine MPS study, 05/2009. Moderate size, severe intensity, partially reversible defect involving the apex. EF 60%. [place] akinetic. 15. Echo, 05/2009. No regional WMA. Severe LAE. No significant valvular heart disease. RVSP 42.    16. Left heart cath, 07/28/2009 (Dr. Fannie Lucia). [address] 0%. LAD. Proximal 20-30%. 99% subtotal mid portion. Diffuse 60%. Left CX large and co-dominant. Diffuse lumen irregularities. RCA co-dominant 20-30% stenosis in the proximal segment. LVEF 45%. Mid-distal anterior wall severe hypokinesis. 17. PCI mid LAD with 3.0x28 and 3.0x12 mm PROMUS LYN post dilated to 3.6 mm using a noncompliant balloon. 18. Lexiscan stress test, 06/28/2012. Small area of infarct in the distal anteroapical wall with no ischemia. Defect thought secondary to artifact or diaphragmatic attenuation. 19. Echo, 07/01/2014 (Dr. Marie Gregg). LVH. Normal EF. Calcified AV. Mild AS. DAVID 1.9 cm². Stage I diastolic dysfunction. RVSP 52 mmHg. Ascending aorta 4.6 cm.     20. Lexiscan MPS, 10/22/2014. Normal perfusion. Apical attenuation artifact. EF 63%. Low risk. [address] admission, 11/26/2014 for one week of increasing NUNES, exertional lightheadedness, and fatigue without chest pain, edema, or syncope. INR therapeutic. EKG apparently revealed AF and he was treated with digoxin, metoprolol succinate, Cardizem CD, and continued on warfarin. 22. RAHEEL guided [address], 12/12/2014.    [facility], normal wall motion, LAE, no valvulopathy, no intracardiac thrombus, successful conversion to NSR with a single 200 joule AP synchronized shock. Past Surgical History:    Past Surgical History:   Procedure Laterality Date    CATARACT REMOVAL Bilateral     CORONARY ANGIOPLASTY WITH STENT PLACEMENT  2009    HERNIA REPAIR Right     PTCA  7/28/2009    stent to mid LAD lesion    ROTATOR CUFF REPAIR Bilateral        Medications Prior to admit:  Prior to Admission medications    Medication Sig Start Date End Date Taking? Authorizing Provider   esomeprazole (NEXIUM) 40 MG delayed release capsule Take 1 capsule by mouth every morning (before breakfast) 10/31/19  Yes Michael Harrell MD   rosuvastatin (CRESTOR) 10 MG tablet Take 5 mg by mouth daily. Indications: takes 1/2 twice a week   Yes Historical Provider, MD   vitamin B-12 (CYANOCOBALAMIN) 1000 MCG tablet Take 1,000 mcg by mouth daily. Yes Historical Provider, MD   folic acid (FOLVITE) 1 MG tablet Take 1 mg by mouth daily. Yes Historical Provider, MD   metoprolol (TOPROL-XL) 50 MG XL tablet Take 50 mg by mouth 2 times daily. Yes Historical Provider, MD   aspirin 81 MG EC tablet Take 81 mg by mouth daily. Yes Historical Provider, MD   tamsulosin (FLOMAX) 0.4 MG capsule Take 0.4 mg by mouth daily. Yes Historical Provider, MD   albuterol sulfate  (90 Base) MCG/ACT inhaler Inhale 2 puffs into the lungs 4 times daily as needed for Wheezing 10/31/19   Michael Harrell MD   CINNAMON PO Take 1,000 mg by mouth daily. Historical Provider, MD   salsalate (DISALCID) 750 MG tablet Take 750 mg by mouth 3 times daily. Historical Provider, MD   WARFARIN SODIUM PO Take  by mouth daily. [facility]  Currently 3 mg    Historical Provider, MD   nitroGLYCERIN (NITROSTAT) 0.4 MG SL tablet Place 0.4 mg under the tongue every 5 minutes as needed for Chest pain. Historical Provider, MD   Coenzyme Q10 (COQ10 PO) Take 1 capsule by mouth daily.     Historical Provider, MD   Omega-3 Fatty Acids (FISH OIL) 1000 MG CAPS Take 1,000 mg by mouth daily.     Historical Provider, MD       Current Medications:    Current Facility-Administered Medications: dilTIAZem (CARDIZEM 12 HR) extended release capsule 60 mg, 60 mg, Oral, BID  sodium chloride flush 0.9 % injection 10 mL, 10 mL, Intravenous, PRN  aspirin EC tablet 81 mg, 81 mg, Oral, Daily  pantoprazole (PROTONIX) tablet 40 mg, 40 mg, Oral, QAM AC  folic acid (FOLVITE) tablet 1 mg, 1 mg, Oral, Daily  rosuvastatin (CRESTOR) tablet 5 mg, 5 mg, Oral, Nightly  tamsulosin (FLOMAX) capsule 0.4 mg, 0.4 mg, Oral, Daily  vitamin B-12 (CYANOCOBALAMIN) tablet 1,000 mcg, 1,000 mcg, Oral, Daily  sodium chloride flush 0.9 % injection 10 mL, 10 mL, Intravenous, 2 times per day  acetaminophen (TYLENOL) tablet 650 mg, 650 mg, Oral, Q6H PRN **OR** acetaminophen (TYLENOL) suppository 650 mg, 650 mg, Rectal, Q6H PRN  polyethylene glycol (GLYCOLAX) packet 17 g, 17 g, Oral, Daily PRN  promethazine (PHENERGAN) tablet 12.5 mg, 12.5 mg, Oral, Q6H PRN **OR** ondansetron (ZOFRAN) injection 4 mg, 4 mg, Intravenous, Q6H PRN  cefTRIAXone (ROCEPHIN) 1 g in sterile water 10 mL IV syringe, 1 g, Intravenous, Q24H    Allergies:  Zocor [simvastatin]    Social History:    Social History     Socioeconomic History    Marital status: Legally      Spouse name: Not on file    Number of children: Not on file    Years of education: Not on file    Highest education level: Not on file   Occupational History    Not on file   Social Needs    Financial resource strain: Not on file    Food insecurity     Worry: Not on file     Inability: Not on file    Transportation needs     Medical: Not on file     Non-medical: Not on file   Tobacco Use    Smoking status: Former Smoker     Years: 25.00     Types: Cigars     Last attempt to quit: 10/10/1974     Years since quittin.8    Smokeless tobacco: Former User     Quit date: 10/10/1972    Tobacco comment: 20 Cigars daily   Substance and Sexual Activity    Alcohol use: No     Comment: quit 2002    Drug use: No    Sexual activity: Not on file   Lifestyle    Physical activity     Days per week: Not on file     Minutes per session: Not on file    Stress: Not on file   Relationships    Social connections     Talks on phone: Not on file     Gets together: Not on file     Attends [employer] service: Not on file     Active member of club or organization: Not on file     Attends meetings of clubs or organizations: Not on file     Relationship status: Not on file    Intimate partner violence     Fear of current or ex partner: Not on file     Emotionally abused: Not on file     Physically abused: Not on file     Forced sexual activity: Not on file   Other Topics Concern    Not on file   Social History Narrative    Not on file       Family History:   No family history on file. REVIEW OF SYSTEMS:     · Constitutional: + fatigue, denies fevers, chills or night sweats  · Eyes: Denies visual changes or drainage  · ENT: Denies headaches or hearing loss. No mouth sores or sore throat. No epistaxis   · Cardiovascular: Denies chest pain, pressure or palpitations. + lower extremity swelling. · Respiratory: + NUNES, cough, orthopnea denies PND. No hemoptysis   · Gastrointestinal: Denies hematemesis or anorexia. No hematochezia or melena    · Genitourinary: Denies urgency, dysuria or hematuria. · Musculoskeletal: Denies gait disturbance, + weakness and joint complaints  · Integumentary: Denies rash, hives or pruritis   · Neurological: Denies dizziness, headaches or seizures. No numbness or tingling  · Psychiatric: Denies anxiety or depression. · Endocrine: Denies temperature intolerance. No recent weight change. .  · Hematologic/Lymphatic: Denies abnormal bruising or bleeding.  No swollen lymph nodes    PHYSICAL EXAM:   /75   Pulse 120   Temp 98.4 °F (36.9 °C) (Temporal)   Resp 16   Ht 6' (1.829 m)   Wt 213 lb (96.6 kg)   SpO2 98%   BMI 28.89 kg/m²   CONST:  Well developed, well reflect debris. This   can be further evaluated with ultrasound as well            CT abdomen:  Impression:          Large right and moderate sized left pleural effusion  and significant   bibasilar infiltrates worse on the right. Findings are nonspecific but   could reflect congestive heart failure. No evidence of pulmonary emboli. Some paratracheal and small mediastinal lymph nodes. Lesion posteriorly in the left kidney. Although this could reflect a   hemorrhagic cyst, a complex solid and cystic mass is not excluded. Recommend follow-up ultrasound. Low-attenuation zones in the bladder which could reflect debris. This   can be further evaluated with ultrasound as well          Intake/Output Summary (Last 24 hours) at 7/28/2020 0954  Last data filed at 7/28/2020 1962  Gross per 24 hour   Intake 1240 ml   Output 2600 ml   Net -1360 ml       Labs:   CBC:   Recent Labs     07/27/20 1800 07/28/20  0425   WBC 4.4* 4.6   HGB 12.2* 11.4*   HCT 38.2 35.4*    192     BMP:   Recent Labs     07/27/20  1800 07/28/20  0425    138   K 4.1 4.3   CO2 26 27   BUN 31* 32*   CREATININE 1.3* 1.4*   LABGLOM 52 48   CALCIUM 10.5* 10.0     Mag: No results for input(s): MG in the last 72 hours. Phos: No results for input(s): PHOS in the last 72 hours.   TSH:   Recent Labs     07/27/20  1800   TSH 1.880     HgA1c: No results found for: LABA1C  No results found for: EAG  proBNP:   Recent Labs     07/27/20  0848   PROBNP 4,876*     PT/INR:   Recent Labs     07/27/20  1800   PROTIME 14.0*   INR 1.2     APTT:  Recent Labs     07/27/20  1800   APTT 29.5     CARDIAC ENZYMES:  Recent Labs     07/27/20  1800 07/28/20  0038 07/28/20  0425   CKMB 2.6 2.1  --    TROPONINI 0.02 0.03 0.01     FASTING LIPID PANEL:No results found for: CHOL, HDL, LDLDIRECT, LDLCALC, TRIG  LIVER PROFILE:  Recent Labs     07/27/20  1800 07/28/20  0425   AST 19 15   ALT 11 10   LABALBU 3.7 3.6     A & P are to follow as per Dr. Mary Andujar [address] Cardiology    Electronically signed by OLGA Sexton on 7/28/2020 at 9:54 AM     Signed              I independently interviewed and examined the patient. I have reviewed the above documentation completed by the MALINA. Please see my additional contributions to the HPI, physical exam, and assessment / medical decision making.     Reason for consult: CHF and A. Fib     He was previously known to Dr. Yael Baron and was also seen by Dr. Jo Grace at [facility]) cardiology in 2016 and was also following at [facility].     Mr. Gilbert Rios is a 15-year-old gentleman with history of permanent atrial fibrillation, cardioverted in 2005, 2006 and 2014, currently on Eliquis for anticoagulation, status post loop recorder in situ, CAD, status post PCI/LYN to mid LAD underwent 2009, hypertension, hyperlipidemia, non-Hodgkin's lymphoma, history of syncope, TIA, GERD/Ponce's esophagus and BPH who presented to the hospital with a 6-week history of fatigue and progressively increasing dyspnea. He had a Lexiscan nuclear stress test at [facility] in June 2018 which showed an EF of 55% inferior wall hypokinesis without ischemia. Transthoracic echo in June 2019 showed an EF of 55+/-5% moderate LVH, severe left atrial enlargement, mild MR and TR and moderate aortic regurgitation. He presented to the hospital with progressively increasing dyspnea and fatigue for the past 6 weeks but denies any orthopnea and paroxysmal nocturnal dyspnea. He also noticed bilateral leg edema and no significant weight gain. In fact, I he lost 25 pounds over the last 6 months. He also noticed black stools for the past 1 month and coughed up bright red blood today. His EKG showed atrial fibrillation with heart rate in the 90s. He had a CTA chest that was negative for PE but it showed large right and moderate sized left pleural effusion and significant bibasilar infiltrates worse on the right.   Findings nonspecific but could reflect congestive heart failure also. No evidence of pulmonary embolism. Small mediastinal and paratracheal lymph nodes noted. There is also left kidney complex cyst was noted. Low-attenuation zones in the bladder was noted. Lab work showed BUN/creatinine of 36/1.4, electrolytes normal proBNP 4876 troponins less than 0.03×3. Liver function normal.  TSH 1.8, WBC 4.6 H&H 11.4/35.4 platelets 072. Serum iron low at 37 TIBC 185 sats 20% INR 1.2. Urinalysis was positive for large amount of nitrates and leukocyte Estrace WBC more than 20 with a moderate amount of bacteria.           Review of Systems:  Cardiac: As per HPI  General: No fever, chills  Pulmonary: As per HPI  GI: No nausea, vomiting  Musculoskeletal: ALEJANDRE x 4, no focal motor deficits  Skin: Intact, no rashes  Neuro/Psych: No headache or seizures     Physical Exam:  /75   Pulse 120   Temp 98.4 °F (36.9 °C) (Temporal)   Resp 16   Ht 6' (1.829 m)   Wt 213 lb (96.6 kg)   SpO2 98%   BMI 28.89 kg/m²   Appearance: Awake, alert, no acute respiratory distress  Skin: Intact, no rash  Head: Normocephalic, atraumatic  ENMT: MMM, no rhinorrhea  Neck: Supple, no carotid bruits, JVD normal  Lungs: Has decreased breath sounds are bases  Cardiac: Irregularly irregular rate and rhythm, +S1S2, no murmurs apparent  Abdomen: Soft, +bowel sounds  Extremities: Moves all extremities x 4, trace lower extremity edema  Neurologic: No focal motor deficits apparent, normal mood and affect     Telemetry findings reviewed: Atrial fibrillation with heart rate in the 90s to 120s no new tachy/bradyarrhythmias overnight     EKG: Atrial fibrillation with RVR, low QRS complexes, nonspecific ST abnormality, abnormal EKG.    Vitals: Blood pressure 131/75 with heart rate of 120 temp 98.4 O2 sats 98%     Labs were reviewed: BUN/creatinine 32/1.4, proBNP 4876, troponins x3-. Liver function normal.  Urinalysis was positive for UTI.  H&H 11.4/35.4 WBC 4.6, platelets 268.   Serum iron 37 TIBC 185.     TTE-6/24/2019 ([facility]): LVEF 55% -5%, indeterminate LV diastolic function due to A. fib, RV size normal with normal RV function, LA severely dilated, dilated aorta, moderate aortic insufficiency, mild MR and TR. Moderate concentric LVH.     Lexiscan nuclear stress test-June 2018 ([facility]): LVEF 52%, there is resting perfusion abnormality in the inferior septal wall consistent with a prior infarction without any reversible perfusion defect. Inferior wall hypokinesis.     Assessment:  · Shortness of breath multifactorial including diastolic heart failure, large bilateral pleural effusions. · Atrial fibrillation, permanent with rate control  · CAD status post PCI/LYN to mid LAD underwent 2009, now with history of inferior scar based on stress test done in 2018 at [facility]. · Hemoptysis, unclear etiology. · Hypertension, well controlled  · Hyperlipidemia on statin  · History of non-Hodgkin's lymphoma  · History of TIA  · GERD  · BPH  · UTI  · Melena rule out GI bleed with mild anemia        Plan:   · Will hold Eliquis for now due to hemoptysis  · Will btain an echocardiogram to assess LV function  · Obtain pulmonary consult due to hemoptysis and bilateral pleural effusion  · We will give him 1 dose of Lasix 40 mg IV and assess urine output for the next 2 hours and will decide about further diuretic management based on urine output. · Management of urine tract infection as per primary service  · Will obtain lower extremity Dopplers to rule out DVT. · We will also get a COVID-19 to rule out COVID-19 pneumonia. · Consider GI consult to evaluate GI source of blood loss due to melena and mild anemia. · Further recommendations will based on echo results.        Thank you for consulting us and will be following with you for any further recommendations.     Leora Alexandra MD, Doyle Pillai.   [facility]) Cardiology

## 2020-07-28 NOTE — CARE COORDINATION
7/28 Transition of Care: Met with patient who is alert. He is focused on his wax in his ears and does not answer questions \" cause I cant hear. \"  Call placed to his daughter who states patient lives alone. He has a walker. He has been to outpatient rehab in the past. He is a non service connected [facility]. He wears oxygen at home and has a nebulizer. His daughter is uncertain of the plan and is coming in to the hospital to talk with the patient. The pcp is Dr Anca Azar and the pharmacy is [facility] pharmacy. PT/OT aster pending. Will await conversation between daughter and patient. Will follow.  DESHAUN ROLON

## 2020-07-29 LAB
ALBUMIN SERPL-MCNC: 3.4 G/DL (ref 3.5–5.2)
ALP BLD-CCNC: 68 U/L (ref 40–129)
ALT SERPL-CCNC: 10 U/L (ref 0–40)
ANION GAP SERPL CALCULATED.3IONS-SCNC: 12 MMOL/L (ref 7–16)
AST SERPL-CCNC: 17 U/L (ref 0–39)
BASOPHILS ABSOLUTE: 0.01 E9/L (ref 0–0.2)
BASOPHILS RELATIVE PERCENT: 0.2 % (ref 0–2)
BILIRUB SERPL-MCNC: 0.9 MG/DL (ref 0–1.2)
BUN BLDV-MCNC: 26 MG/DL (ref 8–23)
CALCIUM SERPL-MCNC: 10 MG/DL (ref 8.6–10.2)
CHLORIDE BLD-SCNC: 97 MMOL/L (ref 98–107)
CO2: 29 MMOL/L (ref 22–29)
CREAT SERPL-MCNC: 1.5 MG/DL (ref 0.7–1.2)
EOSINOPHILS ABSOLUTE: 0.26 E9/L (ref 0.05–0.5)
EOSINOPHILS RELATIVE PERCENT: 5.2 % (ref 0–6)
GFR AFRICAN AMERICAN: 53
GFR NON-AFRICAN AMERICAN: 44 ML/MIN/1.73
GLUCOSE BLD-MCNC: 99 MG/DL (ref 74–99)
HCT VFR BLD CALC: 36.4 % (ref 37–54)
HEMOGLOBIN: 11.7 G/DL (ref 12.5–16.5)
IMMATURE GRANULOCYTES #: 0.04 E9/L
IMMATURE GRANULOCYTES %: 0.8 % (ref 0–5)
LYMPHOCYTES ABSOLUTE: 1.09 E9/L (ref 1.5–4)
LYMPHOCYTES RELATIVE PERCENT: 21.7 % (ref 20–42)
MCH RBC QN AUTO: 27.9 PG (ref 26–35)
MCHC RBC AUTO-ENTMCNC: 32.1 % (ref 32–34.5)
MCV RBC AUTO: 86.7 FL (ref 80–99.9)
MONOCYTES ABSOLUTE: 0.6 E9/L (ref 0.1–0.95)
MONOCYTES RELATIVE PERCENT: 11.9 % (ref 2–12)
NEUTROPHILS ABSOLUTE: 3.03 E9/L (ref 1.8–7.3)
NEUTROPHILS RELATIVE PERCENT: 60.2 % (ref 43–80)
PDW BLD-RTO: 15.6 FL (ref 11.5–15)
PLATELET # BLD: 189 E9/L (ref 130–450)
PMV BLD AUTO: 9.1 FL (ref 7–12)
POTASSIUM REFLEX MAGNESIUM: 4.3 MMOL/L (ref 3.5–5)
RBC # BLD: 4.2 E12/L (ref 3.8–5.8)
SODIUM BLD-SCNC: 138 MMOL/L (ref 132–146)
TOTAL PROTEIN: 6.2 G/DL (ref 6.4–8.3)
WBC # BLD: 5 E9/L (ref 4.5–11.5)

## 2020-07-29 PROCEDURE — 6360000002 HC RX W HCPCS: Performed by: INTERNAL MEDICINE

## 2020-07-29 PROCEDURE — 99232 SBSQ HOSP IP/OBS MODERATE 35: CPT | Performed by: INTERNAL MEDICINE

## 2020-07-29 PROCEDURE — 97530 THERAPEUTIC ACTIVITIES: CPT

## 2020-07-29 PROCEDURE — 6370000000 HC RX 637 (ALT 250 FOR IP): Performed by: INTERNAL MEDICINE

## 2020-07-29 PROCEDURE — 85025 COMPLETE CBC W/AUTO DIFF WBC: CPT

## 2020-07-29 PROCEDURE — 2580000003 HC RX 258: Performed by: STUDENT IN AN ORGANIZED HEALTH CARE EDUCATION/TRAINING PROGRAM

## 2020-07-29 PROCEDURE — 80053 COMPREHEN METABOLIC PANEL: CPT

## 2020-07-29 PROCEDURE — 6360000002 HC RX W HCPCS: Performed by: STUDENT IN AN ORGANIZED HEALTH CARE EDUCATION/TRAINING PROGRAM

## 2020-07-29 PROCEDURE — 36415 COLL VENOUS BLD VENIPUNCTURE: CPT

## 2020-07-29 PROCEDURE — 6370000000 HC RX 637 (ALT 250 FOR IP): Performed by: STUDENT IN AN ORGANIZED HEALTH CARE EDUCATION/TRAINING PROGRAM

## 2020-07-29 PROCEDURE — 97162 PT EVAL MOD COMPLEX 30 MIN: CPT

## 2020-07-29 PROCEDURE — U0003 INFECTIOUS AGENT DETECTION BY NUCLEIC ACID (DNA OR RNA); SEVERE ACUTE RESPIRATORY SYNDROME CORONAVIRUS 2 (SARS-COV-2) (CORONAVIRUS DISEASE [COVID-19]), AMPLIFIED PROBE TECHNIQUE, MAKING USE OF HIGH THROUGHPUT TECHNOLOGIES AS DESCRIBED BY CMS-2020-01-R: HCPCS

## 2020-07-29 PROCEDURE — 2060000000 HC ICU INTERMEDIATE R&B

## 2020-07-29 PROCEDURE — 97166 OT EVAL MOD COMPLEX 45 MIN: CPT

## 2020-07-29 PROCEDURE — 99233 SBSQ HOSP IP/OBS HIGH 50: CPT | Performed by: INTERNAL MEDICINE

## 2020-07-29 RX ORDER — DIGOXIN 250 MCG
250 TABLET ORAL ONCE
Status: COMPLETED | OUTPATIENT
Start: 2020-07-29 | End: 2020-07-29

## 2020-07-29 RX ORDER — METOPROLOL SUCCINATE 25 MG/1
37.5 TABLET, EXTENDED RELEASE ORAL 2 TIMES DAILY
Status: DISCONTINUED | OUTPATIENT
Start: 2020-07-29 | End: 2020-08-05 | Stop reason: HOSPADM

## 2020-07-29 RX ADMIN — ASPIRIN 81 MG: 81 TABLET, COATED ORAL at 08:59

## 2020-07-29 RX ADMIN — METOPROLOL SUCCINATE 37.5 MG: 25 TABLET, EXTENDED RELEASE ORAL at 20:25

## 2020-07-29 RX ADMIN — POTASSIUM CHLORIDE 20 MEQ: 1500 TABLET, EXTENDED RELEASE ORAL at 08:59

## 2020-07-29 RX ADMIN — SODIUM CHLORIDE, PRESERVATIVE FREE 10 ML: 5 INJECTION INTRAVENOUS at 09:00

## 2020-07-29 RX ADMIN — Medication 1000 MCG: at 08:59

## 2020-07-29 RX ADMIN — PANTOPRAZOLE SODIUM 40 MG: 40 TABLET, DELAYED RELEASE ORAL at 05:40

## 2020-07-29 RX ADMIN — METOPROLOL SUCCINATE 25 MG: 25 TABLET, EXTENDED RELEASE ORAL at 08:59

## 2020-07-29 RX ADMIN — SODIUM CHLORIDE, PRESERVATIVE FREE 10 ML: 5 INJECTION INTRAVENOUS at 20:25

## 2020-07-29 RX ADMIN — FUROSEMIDE 20 MG: 10 INJECTION, SOLUTION INTRAMUSCULAR; INTRAVENOUS at 17:30

## 2020-07-29 RX ADMIN — LIDOCAINE HYDROCHLORIDE: 20 SOLUTION ORAL; TOPICAL at 14:44

## 2020-07-29 RX ADMIN — ROSUVASTATIN CALCIUM 5 MG: 5 TABLET, FILM COATED ORAL at 20:25

## 2020-07-29 RX ADMIN — POTASSIUM CHLORIDE 20 MEQ: 1500 TABLET, EXTENDED RELEASE ORAL at 17:30

## 2020-07-29 RX ADMIN — FUROSEMIDE 20 MG: 10 INJECTION, SOLUTION INTRAMUSCULAR; INTRAVENOUS at 08:59

## 2020-07-29 RX ADMIN — TAMSULOSIN HYDROCHLORIDE 0.4 MG: 0.4 CAPSULE ORAL at 08:59

## 2020-07-29 RX ADMIN — DIGOXIN 250 MCG: 250 TABLET ORAL at 16:08

## 2020-07-29 RX ADMIN — FOLIC ACID 1 MG: 1 TABLET ORAL at 08:59

## 2020-07-29 RX ADMIN — CEFTRIAXONE SODIUM 1 G: 1 INJECTION, POWDER, FOR SOLUTION INTRAMUSCULAR; INTRAVENOUS at 08:59

## 2020-07-29 ASSESSMENT — PAIN SCALES - GENERAL
PAINLEVEL_OUTOF10: 0
PAINLEVEL_OUTOF10: 3
PAINLEVEL_OUTOF10: 0

## 2020-07-29 ASSESSMENT — PAIN DESCRIPTION - PROGRESSION: CLINICAL_PROGRESSION: NOT CHANGED

## 2020-07-29 NOTE — PROGRESS NOTES
[facility]  Internal Medicine Residency Program  Progress Note - House Team    Patient:  Emi Ohara 80 y.o. male MRN: [id number]     Date of Service: 7/29/2020     CC: had concerns including Other (patient has multiple complaints for months. ... MULTIPLE, was supposed to get a upper and lower scope today but didn't go). Overnight events: No overnight events. Subjective     Patient seen and examined at the bedside. Patient reports he is content but not improved. He reports continued chest tightness and SOB especially with exertion. He reports a continued cough with one additional episode of hemoptysis yesterday afternoon. He reports burning sensation through his chest and into his epigastrum. He reports he is urinating without difficulty. He denies fevers, chills, chest pain or palpitations, abdominal pain, nausea, vomitting, or diarrhea. Objective     Physical Exam:  · Vitals: BP (!) 101/54   Pulse 115   Temp 97.8 °F (36.6 °C) (Temporal)   Resp 16   Ht 6' (1.829 m)   Wt 213 lb (96.6 kg)   SpO2 97%   BMI 28.89 kg/m²     · I & O - 24hr: I/O this shift:  · In: 240 [P.O.:240]  · Out: 675 [Urine:675]   · General Appearance: alert, appears stated age and cooperative  · HEENT:  Head: Normal, normocephalic, atraumatic. · Neck: no JVD and supple, symmetrical, trachea midline  · Lung: clear to auscultation bilaterally and diminished breath sounds bibasilar  · Heart: regular rate and rhythm, S1, S2 normal, no murmur, click, rub or gallop and irregularly irregular rhythm  · Abdomen: soft, non-tender; bowel sounds normal; no masses,  no organomegaly  · Extremities:  no edema, redness or tenderness in the calves or thighs  · Musculokeletal: No joint swelling, no muscle tenderness. ROM normal in all joints of extremities.    · Neurologic: Mental status: Alert, oriented, thought content appropriate  Subject  Pertinent Labs & Imaging Studies   rosemary  CBC with Differential:    Lab Results Component Value Date    WBC 5.0 07/29/2020    RBC 4.20 07/29/2020    HGB 11.7 07/29/2020    HCT 36.4 07/29/2020     07/29/2020    MCV 86.7 07/29/2020    MCH 27.9 07/29/2020    MCHC 32.1 07/29/2020    RDW 15.6 07/29/2020    LYMPHOPCT 21.7 07/29/2020    MONOPCT 11.9 07/29/2020    BASOPCT 0.2 07/29/2020    MONOSABS 0.60 07/29/2020    LYMPHSABS 1.09 07/29/2020    EOSABS 0.26 07/29/2020    BASOSABS 0.01 07/29/2020     CMP:    Lab Results   Component Value Date     07/29/2020    K 4.3 07/29/2020    CL 97 07/29/2020    CO2 29 07/29/2020    BUN 26 07/29/2020    CREATININE 1.5 07/29/2020    GFRAA 53 07/29/2020    LABGLOM 44 07/29/2020    GLUCOSE 99 07/29/2020    PROT 6.2 07/29/2020    LABALBU 3.4 07/29/2020    CALCIUM 10.0 07/29/2020    BILITOT 0.9 07/29/2020    ALKPHOS 68 07/29/2020    AST 17 07/29/2020    ALT 10 07/29/2020       Resident's Assessment and Plan     Daryl Ross is a 80 y.o. male with a PMHx of CAD, afib, lymphoma, HLD, HTN, LYN in LAD, anemia, asthma, renal failure, and temporal arteritis who presented with the chief complaint of dyspnea and chest tightness on exertion as well as bilateral LE edema. Patient is ibeing actively treated for persistent afib, thoracentesis for bilateral pleural effusion, and Rocephin for UTI. Daryl Ross is a 80 y.o. male  w/ chief complaint of exertional SOB and chest tightness for 5 weeks that has progressed over the past week.     1. Exertional Dyspnea secondary to acute on chronic HF exacerbation               -ECHO 07/28-EF 60-65%. Normal LV. L atrium moderately dilated. Normal RV. Trace mitral regurg. Mild  aortic stenosis w/ valve area 1.7 sq cm. Trace tricuspid regurg. Normal estimate PA pressures. Mildly  dilated aortic root. Normal ascending aorta.               -elevated pro-BNP 4,876   -EKG 7/28-No significant changes to 07/27. afib w/ .   -Cards recommends IV Lasix 20mg BID  2.  Bilateral pleural effusions              -most likely secondary to HF exacerbation with CKD having an additive effect              -Pulmonology consult: Thoracentesis to be performed today, 07/29. Patient off of Elliquis since evening of 7/26.  3. R/o CAP              -Patient remaining afebrile and WBC steady at 5.0.                -Viral respiratory panel negative. -CRP, procalcitonin 0.08              -Respiratory cultures sent.              -Will start Abx acccording to result. 4.CKD stage IIIa              -Urine electrolytes: Na 129, K 22.2, Cl 113, Cr 40              -Bun and creatinine elevated but improving at 26 and 1.5 respectively. 5. Hx afib:               Reduce home dose of diltiazem from 180mg to 60mg BID. Elliquis continued to be held for thoracentesis. Monitor for additional episodes of hemoptysis. Tachycardic and hypotensive overnight, demonstrating need to further control afib. Discussions regarding  beginning cardizem or amiodarone drip.     6. Urinary Tract Infection:              UA was positive for nitrites, large leuk esterase              Microscopic UA = WBC > 20, mod bacteria              Rocephin 1g (day 3)              Urine Cx pendng     7. Left calf pain in setting of imobility and SOB              Wells score 3 with new onset hempotysis              D-dimer+elevated 1521   US Doppler showed no evidence to support DVT. 8. GERD Symptoms:   -GI cocktail given 7/29, will monitor for improvement. 9. HLD              -Continue rosuvastatin   10. Reported melena              -Anemia panel-demonstrating anemia of chronic disease pattern: Iron decreased 37, TIBC decreased 185, Iron sat 20%              -Cards recommended GI consult     PT/OT evaluation:  DVT prophylaxis/ GI prophylaxis: Eliquis held, begin SCV/ Protonix  Disposition: Has been accepted to [address] [place] [address] for rehab at discharge.     Mindi Leigh, [address]  Attending physician: Dr. Darlene Sandoval

## 2020-07-29 NOTE — PLAN OF CARE
Problem:  Activity:  Goal: Capacity to carry out activities will improve  Description: Capacity to carry out activities will improve  Outcome: Met This Shift  Goal: Will verbalize the importance of balancing activity with adequate rest periods  Description: Will verbalize the importance of balancing activity with adequate rest periods  Outcome: Met This Shift     Problem: Cardiac:  Goal: Hemodynamic stability will improve  Description: Hemodynamic stability will improve  Outcome: Met This Shift  Goal: Ability to maintain an adequate cardiac output will improve  Description: Ability to maintain an adequate cardiac output will improve  Outcome: Met This Shift     Problem: Fluid Volume:  Goal: Risk for excess fluid volume will decrease  Description: Risk for excess fluid volume will decrease  Outcome: Met This Shift  Goal: Maintenance of adequate hydration will improve  Description: Maintenance of adequate hydration will improve  Outcome: Met This Shift  Goal: Will show no signs and symptoms of electrolyte imbalance  Description: Will show no signs and symptoms of electrolyte imbalance  Outcome: Met This Shift     Problem: Pain:  Goal: Pain level will decrease  Description: Pain level will decrease  Outcome: Met This Shift  Goal: Control of acute pain  Description: Control of acute pain  Outcome: Met This Shift  Goal: Control of chronic pain  Description: Control of chronic pain  Outcome: Met This Shift     Problem: Skin Integrity:  Goal: Will show no infection signs and symptoms  Description: Will show no infection signs and symptoms  Outcome: Met This Shift  Goal: Absence of new skin breakdown  Description: Absence of new skin breakdown  Outcome: Met This Shift

## 2020-07-29 NOTE — PROGRESS NOTES
Mobility  (Ambulation) Mod A with ww  3-4 side steps  SBA with [employer]  Functional distance   Balance Sitting:     Static:  wfl    Dynamic:SBA  Standing: mod A with ww- retro lean     Activity Tolerance poor  Good-   Visual/  Perceptual Glasses: ? BUE  ROM/Strength/  Fine motor Coordination RUE: ROM WFL     Strength: grossly 3+/5      Strength: fair     Coordination: fair    LUE: ROM  WFL     Strength: grossly 3+/5      Strength: fair     Coordination: fair  Sufficient UE strength for improved indep with functional transfers and mobilty     Hand dominance: R    Hearing: [place]  Sensation:  No c/o numbness or tingling  Tone:  WFL  Edema: BLE noted                          Treatment: OT treatment provided this date includes:    ADL-  Instruction/training on safety and adapted techniques for completion of ADLs:     Mobility-  Instruction/training on safety and improved independence with bed mobility/functional transfers  and functional mobility.   Sitting EOB x 10 minutes to improve dynamic sitting balance and activity tolerance during ADLs.    Skilled monitoring of O2 sats-   SpO2 at rest 97-98%, SpO2 during activity 97%, SpO2 at end of session 98%   HR varied from 124-161   Pt's nurse notified of High pulse rate. Comments: Upon arrival, patient in bed. At end of session, patient in bed with call light and phone within reach, all lines and tubes intact. Pt demonstrating fair- understanding of education/techniques. Patient would benefit from continued skilled OT  to improve safety, functional independence and quality of life.     Assessment of current deficits   Functional mobility [x]  ADLs [x] Strength [x]  Cognition [x]  Functional transfers  [x] IADLs [] Safety Awareness [x]  Endurance [x]  Fine Motor Coordination [x] Balance [x] Vision/perception [] Sensation []   Gross Motor Coordination [x] ROM [] Delirium []                  Motor Control []    Plan of Care:   ADL retraining [x]    Equipment needs [x]   Neuromuscular re-education [x]  Energy Conservation Techniques [x]  Functional Transfer training [x]  Patient and/or Family Education [x]  Functional Mobility training [x]              Environmental Modifications [x]  Cognitive re-training [x]    Compensatory techniques for ADLs [x]  Splinting Needs []    Positioning to improve overall function [x]   Therapeutic Activity [x]               Therapeutic Exercise  [x]  Visual/Perceptual: []     Delirium prevention/treatment  []   Other:  []    Rehab Potential: Good for established goals    Patient / Family Goal:  Not stated     Evaluation time includes thorough review of current medical information, gathering information on past medical & social history & PLOF, completion of standardized testing, informal observation of tasks, consultation with other medical professions/disciplines, assessment of data & development of POC/goals. · Evaluation Complexity: Mod Complexity  · History: Expanded review of medical records and additional review of physical, cognitive, or psychosocial history related to current functional performance  · Exam: 5+ performance deficits  · Assistance/Modification: mod/max assistance or modifications required to perform tasks. May have comorbidities that affect occupational performance.       Treatment Time In: 1597            Treatment Time Out:  1402             Treatment Charges: Mins Units   Ther Ex  [id number]     Manual Therapy Chrissie Grewal [id number] [phone]   ADL/Home Mgt [id number]     Neuro Re-ed [id number]     Group Therapy      Orthotic manage/training  [id number]     Non-Billable Time     Total Timed Treatment [address] [place] [address] [id number]

## 2020-07-29 NOTE — PROGRESS NOTES
INPATIENT CARDIOLOGY FOLLOW-UP    Name: Nereyda Le    Age: 80 y.o. Date of Admission: 7/27/2020  8:13 AM    Date of Service: 7/29/2020    Chief Complaint: Follow-up for CHF and A. Fib    Interim History:  No new overnight cardiac complaints at rest but he gets winded with minimal exertion. . Currently with no complaints of CP, SOB, palpitations, dizziness, or lightheadedness. SR on telemetry. Review of Systems:   Cardiac: As per HPI  General: No fever, chills  Pulmonary: As per HPI  HEENT: No visual disturbances, difficult swallowing  GI: No nausea, vomiting  Endocrine: No thyroid disease or DM  Musculoskeletal: ALEJANDRE x 4, no focal motor deficits  Skin: Intact, no rashes  Neuro/Psych: No headache or seizures    Problem List:  Patient Active Problem List   Diagnosis    [facility]    Anemia    Lymphoma [facility]    Hyperlipidemia    Hypertension    CAD (coronary artery disease)    Presence of drug coated stent in LAD coronary artery    Obesity (BMI 30.0-34. 9)    Congestive heart failure (HCC)    Acute diastolic heart failure with preserved ejection fraction (HCC)    Bilateral pleural effusion    Stage 3 chronic kidney disease (HCC)    Hemoptysis       Allergies:   Allergies   Allergen Reactions    Zocor [Simvastatin] Other (See Comments)     Caused muscle aches       Current Medications:  Current Facility-Administered Medications   Medication Dose Route Frequency Provider Last Rate Last Dose    aluminum & magnesium hydroxide-simethicone (MAALOX) 30 mL, lidocaine viscous hcl (XYLOCAINE) 5 mL (GI COCKTAIL)   Oral Once Cristiano Payton MD        metoprolol succinate (TOPROL XL) extended release tablet 25 mg  25 mg Oral BID Aurea Antoine MD   25 mg at 07/29/20 0819    perflutren lipid microspheres (DEFINITY) injection 1.65 mg  1.5 mL Intravenous ONCE PRN Aurea Antoine MD        furosemide (LASIX) injection 20 mg  20 mg Intravenous BID Aurea Antoine MD   20 mg at 07/29/20 0519  potassium chloride (KLOR-CON M) extended release tablet 20 mEq  20 mEq Oral BID WC Norma Trinh MD   20 mEq at 07/29/20 0859    sodium chloride flush 0.9 % injection 10 mL  10 mL Intravenous PRN Yamile Schumacher II, MD   10 mL at 07/28/20 1135    aspirin EC tablet 81 mg  81 mg Oral Daily Carlton Pedersen MD   81 mg at 07/29/20 0859    pantoprazole (PROTONIX) tablet 40 mg  40 mg Oral QAM AC Carlton Pedersen MD   40 mg at 69/48/15 1031    folic acid (FOLVITE) tablet 1 mg  1 mg Oral Daily Carlton Pedersen MD   1 mg at 07/29/20 0859    rosuvastatin (CRESTOR) tablet 5 mg  5 mg Oral Nightly Carlton Pedersen MD   5 mg at 07/28/20 2243    tamsulosin (FLOMAX) capsule 0.4 mg  0.4 mg Oral Daily Carlton Pedersen MD   0.4 mg at 07/29/20 0859    vitamin B-12 (CYANOCOBALAMIN) tablet 1,000 mcg  1,000 mcg Oral Daily Carlton Pedersen MD   1,000 mcg at 07/29/20 0859    sodium chloride flush 0.9 % injection 10 mL  10 mL Intravenous 2 times per day Carlton Pedersen MD   10 mL at 07/29/20 0900    acetaminophen (TYLENOL) tablet 650 mg  650 mg Oral Q6H PRN Carlton Pedersen MD        Or   Mony Langton acetaminophen (TYLENOL) suppository 650 mg  650 mg Rectal Q6H PRN Carlton Pedersen MD        polyethylene glycol (GLYCOLAX) packet 17 g  17 g Oral Daily PRN Carlton Pedersen MD        promethazine (PHENERGAN) tablet 12.5 mg  12.5 mg Oral Q6H PRN Carlton Pedersen MD        Or    ondansetron [facility]) injection 4 mg  4 mg Intravenous Q6H PRN Carlton Pedersen MD        cefTRIAXone (ROCEPHIN) 1 g in sterile water 10 mL IV syringe  1 g Intravenous Q24H Carlton Pedersen MD   1 g at 07/29/20 0859         Physical Exam:  BP (!) 101/54   Pulse 115   Temp 97.8 °F (36.6 °C) (Temporal)   Resp 16   Ht 6' (1.829 m)   Wt 213 lb (96.6 kg)   SpO2 97%   BMI 28.89 kg/m²   Wt Readings from Last 3 Encounters:   07/27/20 213 lb (96.6 kg)   10/31/19 220 lb (99.8 kg)   10/14/19 224 lb (101.6 kg)     Appearance: Awake, alert, no acute respiratory distress  Skin: Intact, no rash  Head: Normocephalic, atraumatic  Eyes: EOMI, no conjunctival erythema  ENMT: No pharyngeal erythema, MMM, no rhinorrhea  Neck: Supple, no elevated JVP, no carotid bruits  Lungs: Clear to auscultation bilaterally. No wheezes, rales, or rhonchi.   Cardiac: Regular rate and rhythm, +S1S2, no murmurs apparent  Abdomen: Soft, nontender, +bowel sounds  Extremities: Moves all extremities x 4, no lower extremity edema  Neurologic: No focal motor deficits apparent, normal mood and affect  Peripheral Pulses: Intact posterior tibial pulses bilaterally    Intake/Output:    Intake/Output Summary (Last 24 hours) at 7/29/2020 1429  Last data filed at 7/29/2020 1231  Gross per 24 hour   Intake 960 ml   Output 2050 ml   Net -1090 ml     I/O this shift:  In: 240 [P.O.:240]  Out: [address] [Urine:675]    Laboratory Tests:  Recent Labs     07/27/20 1800 07/28/20 0425 07/29/20 0618    138 138   K 4.1 4.3 4.3   CL 99 98 97*   CO2 26 27 29   BUN 31* 32* 26*   CREATININE 1.3* 1.4* 1.5*   GLUCOSE 90 110* 99   CALCIUM 10.5* 10.0 10.0     No results found for: MG  Recent Labs     07/27/20 1800 07/28/20 0425 07/29/20  0618   ALKPHOS 73 71 68   ALT 11 10 10   AST 19 15 17   PROT 6.7 6.2* 6.2*   BILITOT 1.3* 1.0 0.9   LABALBU 3.7 3.6 3.4*     Recent Labs     07/27/20 1800 07/28/20 0425 07/29/20  0618   WBC 4.4* 4.6 5.0   RBC 4.34 4.13 4.20   HGB 12.2* 11.4* 11.7*   HCT 38.2 35.4* 36.4*   MCV 88.0 85.7 86.7   MCH 28.1 27.6 27.9   MCHC 31.9* 32.2 32.1   RDW 15.7* 15.6* 15.6*    192 189   MPV 8.7 9.0 9.1     Lab Results   Component Value Date    CKMB 2.1 07/28/2020    TROPONINI 0.01 07/28/2020    TROPONINI 0.03 07/28/2020    TROPONINI 0.02 07/27/2020     Lab Results   Component Value Date    INR 1.2 07/27/2020    PROTIME 14.0 (H) 07/27/2020     Lab Results   Component Value Date    TSH 1.880 07/27/2020     No results found for: LABA1C  No results found for: EAG  No results found for: CHOL  No results found for: TRIG  No results found for: HDL  No results found for: LDLCALC, LDLCHOLESTEROL  No results found for: LABVLDL, VLDL  No results found for: CHOLHDLRATIO    Cardiac Tests:  Telemetry findings reviewed: Atrial fibrillation with heart rate in the 110s no new tachy/bradyarrhythmias overnight     EKG: Atrial fibrillation with RVR, low QRS complexes, nonspecific ST abnormality, abnormal EKG.    Vitals: Blood pressure 131/75 with heart rate of 120 temp 98.4 O2 sats 98%>>>101/54,      Labs were reviewed: BUN/creatinine 32/1.4>> 26/1.5, proBNP 4876, troponins x3-. Liver function normal.  Urinalysis was positive for UTI.  H&H 11.4/35.4>> 11.7/36.4,  WBC 4.6, platelets 277. Serum iron 37 TIBC 185.     TTE-6/24/2019 ([facility]): LVEF 55% -5%, indeterminate LV diastolic function due to A. fib, RV size normal with normal RV function, LA severely dilated, dilated aorta, moderate aortic insufficiency, mild MR and TR. Moderate concentric LVH.     Lexiscan nuclear stress test-June 2018 ([facility]): LVEF 52%, there is resting perfusion abnormality in the inferior septal wall consistent with a prior infarction without any reversible perfusion defect. Inferior wall hypokinesis.         Assessment:  · Shortness of breath multifactorial including diastolic heart failure, large bilateral pleural effusions. UOP 2.1 L with a net -2.5 L, improving  · Atrial fibrillation, permanent with RVR  · CAD status post PCI/LYN to mid LAD underwent 2009, now with history of inferior scar based on stress test done in 2018 at [facility]. · Hemoptysis, unclear etiology. · Hypertension, well controlled  · Hyperlipidemia on statin  · History of non-Hodgkin's lymphoma  · History of TIA  · GERD  · BPH  · UTI  · Melena rule out GI bleed with mild anemia        Plan:   · Hold Eliquis for now due to hemoptysis for thoracentesis.   · Echocardiogram to assess LV function is pending  · Dr. Randi Cash input from pulmonary service was noted, thoracentesis in the next

## 2020-07-29 NOTE — PROGRESS NOTES
Pulmonary [facility]                             Pulmonary Consult/Progress Note :              Consulting Physician:Bilateral Pleural effusion       Reason for Consultation:bilateral effusion   CC : sob   HPI:   Doing very well   SOB has improved and no fever or chills  Off Eliquis       PHYSICAL EXAMINATION:     VITAL SIGNS:  BP (!) 101/54   Pulse 115   Temp 97.8 °F (36.6 °C) (Temporal)   Resp 16   Ht 6' (1.829 m)   Wt 213 lb (96.6 kg)   SpO2 97%   BMI 28.89 kg/m²   Wt Readings from Last 3 Encounters:   07/27/20 213 lb (96.6 kg)   10/31/19 220 lb (99.8 kg)   10/14/19 224 lb (101.6 kg)     Temp Readings from Last 3 Encounters:   07/29/20 97.8 °F (36.6 °C) (Temporal)     TMAX:  BP Readings from Last 3 Encounters:   07/29/20 (!) 101/54   10/31/19 118/60   10/14/19 120/68     Pulse Readings from Last 3 Encounters:   07/29/20 115   10/31/19 78   10/14/19 100           INTAKE/OUTPUTS:  I/O last 3 completed shifts:   In: 960 [P.O.:960]  Out: 2100 [Urine:2100]    Intake/Output Summary (Last 24 hours) at 7/29/2020 1309  Last data filed at 7/29/2020 1231  Gross per 24 hour   Intake 960 ml   Output 2050 ml   Net -1090 ml       General Appearance: alert and oriented to person, place and time, well-developed and   well-nourished, in no acute distress   Eyes: pupils equal, round, and reactive to light, extraocular eye movements intact, conjunctivae normal and sclera anicteric   Neck: neck supple and non tender without mass, no thyromegaly, no thyroid nodules and no cervical adenopathy   Pulmonary/Chest:decrease braeth sound bilateral    Cardiovascular: normal rate, regular rhythm, normal S1 and S2, no murmurs, rubs, clicks or gallops, distal pulses intact, no carotid bruits, no murmurs, no gallops, no carotid bruits and no JVD   Abdomen: obese, soft, non-tender, non-distended, normal bowel sounds, no masses or organomegaly   Extremities no edema   Musculoskeletal: normal range 30.0-34. 9)    Congestive heart failure (HCC)    Acute diastolic heart failure with preserved ejection fraction (HCC)    Bilateral pleural effusion    Stage 3 chronic kidney disease (HCC)    Hemoptysis               ASSESSMENT:  1.) Bilateral  pleural effusion Right more than left   2.)Medistinal adenopathy   3. )CHF   4.)Possible COPD   5. )VESTA/      PLAN:  *-for thoracentesis tomorrow 1:30 pm   *-will send for flow cytometry  *-diuresis per primary  *-BD   *_NIMV if in any distress    To me seems CHF and need diuresis and lymph nodes can be related ,but given his history of lymphoma . will need to to proceed with EBUS or PET scan as OP         Thank you very much for allowing me to participate in the care of this pleasant patient , should you have any questions ,please do not hesitate to contact me      Dmitry Murguia MD,[facility]  Pulmonary&Critical Care Medicine   Director of [address] [place] [address] Director of [address] [place] [address]    Ruben De Los Santos    NOTE: This report was transcribed using voice recognition software. Every effort was made to ensure accuracy; however, inadvertent computerized transcription errors may be present.

## 2020-07-29 NOTE — PROGRESS NOTES
[facility]  Internal Medicine Residency / [address]    Attending Physician Statement  I have discussed the case, including pertinent history and exam findings with the resident and the team.  I have seen and examined the patient and the key elements of the encounter have been performed by me. I agree with the assessment, plan and orders as documented by the resident. Patient does complains of some burning in the midepigastric region, reports he takes a herbal supplement to improve this at home. He denies any shortness of breath or productive cough. 1.  Dyspnea on exertion from acute on chronic diastolic heart failure with bilateral pleural effusion  2. A. fib with RVR, now rate controlled  3. Hemoptysis, possibly from pulmonary edema  4. Acute cystitis history of BPH  5. Hx of melanotic stools  6. CKD stage III  7. Normocytic anemia    Plan for thoracentesis per pulmonology and Eliquis on hold. Digoxin dose given per cardiology for A. fib rate control on Toprol-XL uptitrated. Stop ceftriaxone as urine culture < 10,000. He is net -3 L from admission, will need to check daily weights and continue IV diuresis. Remainder of medical problems as per resident note.       Lico Del Valle DO  Internal Medicine Residency Faculty

## 2020-07-29 NOTE — PROGRESS NOTES
[facility]  Internal Medicine Residency Program  Progress Note - House Team 2    Patient:  Nereyda Le 80 y.o. male MRN: [id number]     Date of Service: 7/29/2020     CC: Sob and leg edema  Overnight events: There is no acute events overnight, patient sleep better than yesterday. - Chest XR: bilateral pleural effusion more significant on the right side. - ECHO: Normal left ventricle size and systolic function. Ejection fraction is visually estimated at 60-65%. The left atrium is moderately dilated. - US dup lower extremities bilateral: No evidence to suggest deep venous thrombosis of the bilateral lower extremities. Subjective     Seen patient at bedside this morning. Patient feeling better. He denies chest pain, has mild SOB, no headache, dizziness, palpitation. Patient cough a little with some yellowish sputum, no hemoptysis. Patient has burning sensation down the stomach but no abdominal pain, no difficulty urinating. Patient has not has bowel movement for 3 days. I/O: 960/2100    Objective     Physical Exam:  · Vitals: BP (!) 101/54   Pulse 115   Temp 97.8 °F (36.6 °C) (Temporal)   Resp 16   Ht 6' (1.829 m)   Wt 213 lb (96.6 kg)   SpO2 97%   BMI 28.89 kg/m²     I & O - 24hr: I/O this shift:  In: 240 [P.O.:240]  · Out: 675 [Urine:675]   · General Appearance: alert, appears stated age, cooperative and no distress  · HEENT:  Head: Normocephalic, no lesions, without obvious abnormality.   · Neck: no adenopathy, no carotid bruit, no JVD, supple, symmetrical, trachea midline and thyroid not enlarged, symmetric, no tenderness/mass/nodules  · Lung: diminished breath sounds bilaterally  · Heart: irregularly irregular rhythm and no murmur, no gallop, no rub   · Abdomen: soft, non-tender; bowel sounds normal; no masses,  no organomegaly  · Extremities:  edema 1+ belaterally  · Musculokeletal: No joint swelling, tenderness over left calf, decrease ROM over left leg, strength 2 + left leg, 4+ right leg  · Neurologic: Mental status: Alert, oriented, thought content appropriate  Subject  Pertinent Labs & Imaging Studies   rosemary  CBC with Differential:    Lab Results   Component Value Date    WBC 5.0 07/29/2020    RBC 4.20 07/29/2020    HGB 11.7 07/29/2020    HCT 36.4 07/29/2020     07/29/2020    MCV 86.7 07/29/2020    MCH 27.9 07/29/2020    MCHC 32.1 07/29/2020    RDW 15.6 07/29/2020    LYMPHOPCT 21.7 07/29/2020    MONOPCT 11.9 07/29/2020    BASOPCT 0.2 07/29/2020    MONOSABS 0.60 07/29/2020    LYMPHSABS 1.09 07/29/2020    EOSABS 0.26 07/29/2020    BASOSABS 0.01 07/29/2020     CMP:    Lab Results   Component Value Date     07/29/2020    K 4.3 07/29/2020    CL 97 07/29/2020    CO2 29 07/29/2020    BUN 26 07/29/2020    CREATININE 1.5 07/29/2020    GFRAA 53 07/29/2020    LABGLOM 44 07/29/2020    GLUCOSE 99 07/29/2020    PROT 6.2 07/29/2020    LABALBU 3.4 07/29/2020    CALCIUM 10.0 07/29/2020    BILITOT 0.9 07/29/2020    ALKPHOS 68 07/29/2020    AST 17 07/29/2020    ALT 10 07/29/2020     BUN/Creatinine:    Lab Results   Component Value Date    BUN 26 07/29/2020    CREATININE 1.5 07/29/2020     Albumin:    Lab Results   Component Value Date    LABALBU 3.4 07/29/2020     Ionized Calcium:  No results found for: IONCA  Magnesium:  No results found for: MG  Last 3 Troponin:    Lab Results   Component Value Date    TROPONINI 0.01 07/28/2020    TROPONINI 0.03 07/28/2020    TROPONINI 0.02 07/27/2020     U/A:    Lab Results   Component Value Date    COLORU Yellow 07/27/2020    PROTEINU TRACE 07/27/2020    PHUR 7.0 07/27/2020    WBCUA >20 07/27/2020    RBCUA 0-1 07/27/2020    BACTERIA MODERATE 07/27/2020    CLARITYU SL CLOUDY 07/27/2020    SPECGRAV 1.015 07/27/2020    LEUKOCYTESUR LARGE 07/27/2020    UROBILINOGEN 1.0 07/27/2020    BILIRUBINUR Negative 07/27/2020    BLOODU TRACE-INTACT 07/27/2020    GLUCOSEU Negative 07/27/2020     IRON:    Lab Results   Component Value Date    IRON 37 07/28/2020 Iron Saturation:  No components found for: PERCENTFE  TIBC:    Lab Results   Component Value Date    TIBC 185 07/28/2020     FERRITIN:  No results found for: FERRITIN    Resident's Assessment and Plan     Hortensia Llanos is a 80 y.o. male with PHMx of Afib, HTN, HLD, anemia, lymphoma presented to the ED complains of SOB and legs edema. CTA chest significant for bilateral pleural effusion, no evidence of PE. Troponin negative, Pro BNP elevated 4687. Patient has CXR today shows bilateral pleural effusion, ECHO:  Normal left ventricle size and systolic function. Ejection fraction is visually estimated at 60-65%. The left atrium is moderately dilated. US dup lower extremities shows no evidence to suggest deep venous thrombosis of the bilateral lower extremities. 1. CHF exacerbation with bilateral pleural effusion  - Patient has Hx of HTN, Afib with cardioversion present to the Ed with SOB, bilateral lower legs swelling.   - Bilateral pleural effusion on physical exam and on CTA chest.   - Pro BNP on admission is elevated ( 4876)   - EKG shows no ST changes. Patient has troponin negative x3  - CTA chest show no evidence of pulmonary emboli  - CXR indicate mild to moderate pleural effusion on the right side, small on left side, which is improved from yesterday. - ECHO: Normal LV function with preserved EF ( 60-65%)  - I/O : 960/2100ml  - Plan today:  · Continue lasix 20 mg BID  · Pulmonary consulted and decided to have US thoracentesis today for pleural effusion, appreciate recommendations  · Monitor vitals,I/O  · Check CXR, monitor chest tube drainage. 2. Afib RVR  - Patient has BP of 101/54mmHg, pulse 115 this morning.  - Denies chest pain, dizziness, palpitation, headache.   - Patient is on lasix 20 mg, urine out put 2L/24h.  - Plan:   ·  ml Normal saline infusion.   · Currently on Toprol 25mg BID  · Follow vitals, BP, spO2.  3. Community acquired pneumonia  - Patient presents with symptoms of cough up yellow sputum, SOB, patient has low fever at home; physical exam and imaging shows bilateral pleural effusion.  - CBC shows WBC in normal range( 5.0). - Respiratory panel result negative, waiting for blood and res culture. - Start antibiotics according to the result. - Follow up vitals. - Hold eliquis and Pulmonology consulted regarding hemoptysis and pleural effusion.  - Follow up with Pulmonology    4. CKD stage III  - Creatinine 1.4   - Plan: Continue follow BMP, I/O.      5. Black starry stools  - Patient reports having black starry stools, CBC shows mild normocytic anemia  - Plan: POCT occult blood stools, Order iron studies.   - Iron studies result: Iron 37, TIBC 185.   - Hold apixaban for now. 6. Hyperlipidemia  - On rosuvastatin 5mg daily. 7. Hematology/ Oncology:    Hx of lymphoma  8. UTI   - U/A shows positive for WBC, nitrites, bacteria  - No fever, no urinary symptoms for now  - Currently on rocephin 1g  - Send urine culture. 9. Musculoskeletal:   1. Left calf pain likely DVT. - Patient has left calf pain, swelling of lower extremities. Today patient improve on pain and strength left leg. - Well scores of 2.  - D- Dimer 1521  - US dup lower extremities shows no evidence of to suggest deep venous thrombosis of the bilateral lower extremities.    10. GERD   - Patient complains of burning sensation in stomach   - Currently on Protonix  - Ordered GI cocktail.   - Follow up symptoms   DVT ppx: Hold Eliquis  GI ppx: Protonix          Samantha Ferrera MD, PGY-1  Attending physician: Dr. Eric Saldana DO.

## 2020-07-29 NOTE — PLAN OF CARE
Problem:  Activity:  Goal: Capacity to carry out activities will improve  Description: Capacity to carry out activities will improve  7/29/2020 1553 by Tej More RN  Outcome: Met This Shift  7/29/2020 1553 by Tej More RN  Outcome: Met This Shift  Goal: Will verbalize the importance of balancing activity with adequate rest periods  Description: Will verbalize the importance of balancing activity with adequate rest periods  7/29/2020 1553 by Tej More RN  Outcome: Met This Shift  7/29/2020 1553 by Tej More RN  Outcome: Met This Shift     Problem: Cardiac:  Goal: Hemodynamic stability will improve  Description: Hemodynamic stability will improve  7/29/2020 1553 by Tej More RN  Outcome: Met This Shift  7/29/2020 1553 by Tej More RN  Outcome: Met This Shift  Goal: Ability to maintain an adequate cardiac output will improve  Description: Ability to maintain an adequate cardiac output will improve  7/29/2020 1553 by Tej More RN  Outcome: Met This Shift  7/29/2020 1553 by Tej More RN  Outcome: Met This Shift     Problem: Fluid Volume:  Goal: Risk for excess fluid volume will decrease  Description: Risk for excess fluid volume will decrease  7/29/2020 1553 by Tej More RN  Outcome: Met This Shift  7/29/2020 1553 by Tej More RN  Outcome: Met This Shift  Goal: Maintenance of adequate hydration will improve  Description: Maintenance of adequate hydration will improve  7/29/2020 1553 by Tej More RN  Outcome: Met This Shift  7/29/2020 1553 by Tej More RN  Outcome: Met This Shift  Goal: Will show no signs and symptoms of electrolyte imbalance  Description: Will show no signs and symptoms of electrolyte imbalance  7/29/2020 1553 by Tej More RN  Outcome: Met This Shift  7/29/2020 1553 by Tej More RN  Outcome: Met This Shift     Problem: Pain:  Goal: Pain level will decrease  Description: Pain level will decrease  7/29/2020 1553 by Mark Mckeon RN  Outcome: Met This Shift  7/29/2020 1553 by Mark Mckeon RN  Outcome: Met This Shift  Goal: Control of acute pain  Description: Control of acute pain  7/29/2020 1553 by Mark Mckeon RN  Outcome: Met This Shift  7/29/2020 1553 by Mark Mckeon RN  Outcome: Met This Shift  Goal: Control of chronic pain  Description: Control of chronic pain  7/29/2020 1553 by Mark Mckeon RN  Outcome: Met This Shift  7/29/2020 1553 by Mark Mckeon RN  Outcome: Met This Shift     Problem: Skin Integrity:  Goal: Will show no infection signs and symptoms  Description: Will show no infection signs and symptoms  7/29/2020 1553 by Mark Mckeon RN  Outcome: Met This Shift  7/29/2020 1553 by Mark Mckeon RN  Outcome: Met This Shift  Goal: Absence of new skin breakdown  Description: Absence of new skin breakdown  7/29/2020 1553 by Mark Mckeon RN  Outcome: Met This Shift  7/29/2020 1553 by Mark Mckeon RN  Outcome: Met This Shift     Problem: Cardiac:  Goal: Hemodynamic stability will improve  Description: Hemodynamic stability will improve  7/29/2020 1553 by Mark Mckeon RN  Outcome: Met This Shift  7/29/2020 1553 by Mark Mckeon RN  Outcome: Met This Shift     Problem: Cardiac:  Goal: Ability to maintain an adequate cardiac output will improve  Description: Ability to maintain an adequate cardiac output will improve  7/29/2020 1553 by Mark Mckeon RN  Outcome: Met This Shift  7/29/2020 1553 by Mark Mckeon RN  Outcome: Met This Shift     Problem: Fluid Volume:  Goal: Risk for excess fluid volume will decrease  Description: Risk for excess fluid volume will decrease  7/29/2020 1553 by Mark Mckeon RN  Outcome: Met This Shift  7/29/2020 1553 by Mark Mckeon RN  Outcome: Met This Shift

## 2020-07-29 NOTE — PLAN OF CARE
Problem:  Activity:  Goal: Capacity to carry out activities will improve  Description: Capacity to carry out activities will improve  7/29/2020 0104 by Arthurine Pal  Outcome: Met This Shift  7/29/2020 0057 by Samuel Blood RN  Outcome: Met This Shift  Goal: Will verbalize the importance of balancing activity with adequate rest periods  Description: Will verbalize the importance of balancing activity with adequate rest periods  7/29/2020 0104 by Arthurine Pal  Outcome: Met This Shift  7/29/2020 0057 by Samuel Blood RN  Outcome: Met This Shift     Problem: Cardiac:  Goal: Hemodynamic stability will improve  Description: Hemodynamic stability will improve  7/29/2020 0104 by Arthurine Pal  Outcome: Met This Shift  7/29/2020 0057 by Samuel Blood RN  Outcome: Met This Shift  Goal: Ability to maintain an adequate cardiac output will improve  Description: Ability to maintain an adequate cardiac output will improve  7/29/2020 0104 by Arthurine Pal  Outcome: Met This Shift  7/29/2020 0057 by Samuel Blood RN  Outcome: Met This Shift     Problem: Fluid Volume:  Goal: Risk for excess fluid volume will decrease  Description: Risk for excess fluid volume will decrease  7/29/2020 0104 by Arthurine Pal  Outcome: Met This Shift  7/29/2020 0057 by Samuel Blood RN  Outcome: Met This Shift  Goal: Maintenance of adequate hydration will improve  Description: Maintenance of adequate hydration will improve  7/29/2020 0104 by Arthurine Pal  Outcome: Met This Shift  7/29/2020 0057 by Samuel Blood RN  Outcome: Met This Shift  Goal: Will show no signs and symptoms of electrolyte imbalance  Description: Will show no signs and symptoms of electrolyte imbalance  7/29/2020 0104 by Arthurine Pal  Outcome: Met This Shift  7/29/2020 0057 by Samuel Blood RN  Outcome: Met This Shift     Problem: Pain:  Goal: Pain level will decrease  Description: Pain level will decrease  7/29/2020 0104 by Arthurine Pal  Outcome: Met This Shift  7/29/2020 0057 by Blake Gonzalez RN  Outcome: Met This Shift  Goal: Control of acute pain  Description: Control of acute pain  7/29/2020 0104 by Jose Raul Davalos  Outcome: Met This Shift  7/29/2020 0057 by Blake Gonzalez RN  Outcome: Met This Shift  Goal: Control of chronic pain  Description: Control of chronic pain  7/29/2020 0104 by Jose Raul Davalos  Outcome: Met This Shift  7/29/2020 0057 by Blake Gonzalez RN  Outcome: Met This Shift     Problem: Skin Integrity:  Goal: Will show no infection signs and symptoms  Description: Will show no infection signs and symptoms  7/29/2020 0104 by Jose Raul Davalos  Outcome: Met This Shift  7/29/2020 0057 by Blake Gonzalez RN  Outcome: Met This Shift  Goal: Absence of new skin breakdown  Description: Absence of new skin breakdown  7/29/2020 0104 by Jose Raul Davalos  Outcome: Met This Shift  7/29/2020 0057 by Blake Gonzalez RN  Outcome: Met This Shift

## 2020-07-29 NOTE — PROGRESS NOTES
utilization of call light for assistance with mobility. Patient response to education:   Pt verbalized understanding Pt demonstrated skill Pt requires further education in this area   yes yes yes     ASSESSMENT:    Comments:  RN cleared pt for activity prior to session. Pt received supine in bed and agreeable to PT intervention with OT collaboration at this time. Pt performed all functional mobility as noted above. Pt with multiple non-specific complaints regarding his overall health. Pt is questionable historian unable to provide a clear timeline of his decline in function and arrival to hospital.  In standing, pt unable to achieve full knee extension and reported SOB with elevated HR limiting session prematurely. Pt returned to supine at end of session and left with all needs met and call light in reach. RN notified of pt's elevated HR. Pt requires continued skilled PT intervention for the purposes of maximizing functional mobility and independence. Treatment:  Patient practiced and was instructed in the following treatment:     Therapeutic Activities Completed:  o Functional mobility as noted above:   - Bed mobility: Jason to complete. Max VC for efficient use of BUE to complete log roll. Good seated balance at EOB at SBA level. - Transfer training: sit<>stand modA to partial stand. Max VC for proper hand placement and sequencing.    - Ambulation: side steps towards HOB with front [employer] modA. Max VC to improve posture and extend bilateral knees but pt was unable.  o Skilled repositioning in supine with HOB elevated for comfort.  o Pt education as noted above. Pt's/ family goals   1. Not stated. Patient and or family understand(s) diagnosis, prognosis, and plan of care. yes    PLAN:    PT care will be provided in accordance with the objectives noted above. Exercises and functional mobility practice will be used as well as appropriate assistive devices or modalities to obtain goals. Patient and family education will also be administered as needed. Frequency of treatments: 2-5x/week x 1-2 weeks. Time in  1339  Time out  1402    Total Treatment Time  15 minutes     Evaluation Time includes thorough review of current medical information, gathering information on past medical history/social history and prior level of function, completion of standardized testing/informal observation of tasks, assessment of data and education on plan of care and goals.     CPT codes:  [] Low Complexity PT evaluation 66984  [x] Moderate Complexity PT evaluation 92124  [] High Complexity PT evaluation 00664  [] PT Re-evaluation 04027  [] Gait training 76503 0 minutes  [] Manual therapy 57847 0 minutes  [x] Therapeutic activities 32368 15 minutes  [] Therapeutic exercises 41313 0 minutes  [] Neuromuscular reeducation 70725 0 minutes     Eric Joy, PT, DPT  [id number]

## 2020-07-30 ENCOUNTER — APPOINTMENT (OUTPATIENT)
Dept: ULTRASOUND IMAGING | Age: 85
DRG: 291 | End: 2020-07-30
Payer: MEDICARE

## 2020-07-30 ENCOUNTER — APPOINTMENT (OUTPATIENT)
Dept: GENERAL RADIOLOGY | Age: 85
DRG: 291 | End: 2020-07-30
Payer: MEDICARE

## 2020-07-30 LAB
ALBUMIN SERPL-MCNC: 3.6 G/DL (ref 3.5–5.2)
ALP BLD-CCNC: 72 U/L (ref 40–129)
ALT SERPL-CCNC: 13 U/L (ref 0–40)
ANION GAP SERPL CALCULATED.3IONS-SCNC: 15 MMOL/L (ref 7–16)
AST SERPL-CCNC: 19 U/L (ref 0–39)
BASOPHILS ABSOLUTE: 0.02 E9/L (ref 0–0.2)
BASOPHILS RELATIVE PERCENT: 0.4 % (ref 0–2)
BILIRUB SERPL-MCNC: 1.1 MG/DL (ref 0–1.2)
BUN BLDV-MCNC: 28 MG/DL (ref 8–23)
CALCIUM SERPL-MCNC: 10 MG/DL (ref 8.6–10.2)
CHLORIDE BLD-SCNC: 98 MMOL/L (ref 98–107)
CO2: 25 MMOL/L (ref 22–29)
CREAT SERPL-MCNC: 1.4 MG/DL (ref 0.7–1.2)
EOSINOPHILS ABSOLUTE: 0.24 E9/L (ref 0.05–0.5)
EOSINOPHILS RELATIVE PERCENT: 4.9 % (ref 0–6)
GFR AFRICAN AMERICAN: 58
GFR NON-AFRICAN AMERICAN: 48 ML/MIN/1.73
GLUCOSE BLD-MCNC: 89 MG/DL (ref 74–99)
HCT VFR BLD CALC: 39.2 % (ref 37–54)
HEMOGLOBIN: 12.7 G/DL (ref 12.5–16.5)
IMMATURE GRANULOCYTES #: 0.03 E9/L
IMMATURE GRANULOCYTES %: 0.6 % (ref 0–5)
LYMPHOCYTES ABSOLUTE: 1.19 E9/L (ref 1.5–4)
LYMPHOCYTES RELATIVE PERCENT: 24.2 % (ref 20–42)
MCH RBC QN AUTO: 28 PG (ref 26–35)
MCHC RBC AUTO-ENTMCNC: 32.4 % (ref 32–34.5)
MCV RBC AUTO: 86.5 FL (ref 80–99.9)
MONOCYTES ABSOLUTE: 0.62 E9/L (ref 0.1–0.95)
MONOCYTES RELATIVE PERCENT: 12.6 % (ref 2–12)
NEUTROPHILS ABSOLUTE: 2.81 E9/L (ref 1.8–7.3)
NEUTROPHILS RELATIVE PERCENT: 57.3 % (ref 43–80)
PDW BLD-RTO: 15.5 FL (ref 11.5–15)
PLATELET # BLD: 208 E9/L (ref 130–450)
PMV BLD AUTO: 9.4 FL (ref 7–12)
POTASSIUM REFLEX MAGNESIUM: 4.3 MMOL/L (ref 3.5–5)
RBC # BLD: 4.53 E12/L (ref 3.8–5.8)
SODIUM BLD-SCNC: 138 MMOL/L (ref 132–146)
TOTAL PROTEIN: 6.5 G/DL (ref 6.4–8.3)
URINE CULTURE, ROUTINE: NORMAL
WBC # BLD: 4.9 E9/L (ref 4.5–11.5)

## 2020-07-30 PROCEDURE — 87206 SMEAR FLUORESCENT/ACID STAI: CPT

## 2020-07-30 PROCEDURE — 83986 ASSAY PH BODY FLUID NOS: CPT

## 2020-07-30 PROCEDURE — 88305 TISSUE EXAM BY PATHOLOGIST: CPT

## 2020-07-30 PROCEDURE — 6370000000 HC RX 637 (ALT 250 FOR IP): Performed by: INTERNAL MEDICINE

## 2020-07-30 PROCEDURE — 89051 BODY FLUID CELL COUNT: CPT

## 2020-07-30 PROCEDURE — 83615 LACTATE (LD) (LDH) ENZYME: CPT

## 2020-07-30 PROCEDURE — 0W993ZZ DRAINAGE OF RIGHT PLEURAL CAVITY, PERCUTANEOUS APPROACH: ICD-10-PCS | Performed by: INTERNAL MEDICINE

## 2020-07-30 PROCEDURE — 84157 ASSAY OF PROTEIN OTHER: CPT

## 2020-07-30 PROCEDURE — 99232 SBSQ HOSP IP/OBS MODERATE 35: CPT | Performed by: INTERNAL MEDICINE

## 2020-07-30 PROCEDURE — 2060000000 HC ICU INTERMEDIATE R&B

## 2020-07-30 PROCEDURE — 87205 SMEAR GRAM STAIN: CPT

## 2020-07-30 PROCEDURE — 82947 ASSAY GLUCOSE BLOOD QUANT: CPT

## 2020-07-30 PROCEDURE — 85025 COMPLETE CBC W/AUTO DIFF WBC: CPT

## 2020-07-30 PROCEDURE — 2580000003 HC RX 258: Performed by: STUDENT IN AN ORGANIZED HEALTH CARE EDUCATION/TRAINING PROGRAM

## 2020-07-30 PROCEDURE — 87015 SPECIMEN INFECT AGNT CONCNTJ: CPT

## 2020-07-30 PROCEDURE — 88112 CYTOPATH CELL ENHANCE TECH: CPT

## 2020-07-30 PROCEDURE — 71045 X-RAY EXAM CHEST 1 VIEW: CPT

## 2020-07-30 PROCEDURE — 87116 MYCOBACTERIA CULTURE: CPT

## 2020-07-30 PROCEDURE — 80053 COMPREHEN METABOLIC PANEL: CPT

## 2020-07-30 PROCEDURE — 6360000002 HC RX W HCPCS: Performed by: INTERNAL MEDICINE

## 2020-07-30 PROCEDURE — 36415 COLL VENOUS BLD VENIPUNCTURE: CPT

## 2020-07-30 PROCEDURE — 85013 SPUN MICROHEMATOCRIT: CPT

## 2020-07-30 PROCEDURE — C1729 CATH, DRAINAGE: HCPCS

## 2020-07-30 PROCEDURE — 6370000000 HC RX 637 (ALT 250 FOR IP): Performed by: STUDENT IN AN ORGANIZED HEALTH CARE EDUCATION/TRAINING PROGRAM

## 2020-07-30 PROCEDURE — 32555 ASPIRATE PLEURA W/ IMAGING: CPT | Performed by: INTERNAL MEDICINE

## 2020-07-30 PROCEDURE — 87070 CULTURE OTHR SPECIMN AEROBIC: CPT

## 2020-07-30 RX ORDER — SUCRALFATE 1 G/1
1 TABLET ORAL EVERY 6 HOURS SCHEDULED
Status: DISCONTINUED | OUTPATIENT
Start: 2020-07-30 | End: 2020-08-05 | Stop reason: HOSPADM

## 2020-07-30 RX ORDER — FUROSEMIDE 10 MG/ML
40 INJECTION INTRAMUSCULAR; INTRAVENOUS 2 TIMES DAILY
Status: DISCONTINUED | OUTPATIENT
Start: 2020-07-31 | End: 2020-07-31

## 2020-07-30 RX ADMIN — FOLIC ACID 1 MG: 1 TABLET ORAL at 09:34

## 2020-07-30 RX ADMIN — SUCRALFATE 1 G: 1 TABLET ORAL at 17:26

## 2020-07-30 RX ADMIN — Medication 1000 MCG: at 09:33

## 2020-07-30 RX ADMIN — METOPROLOL SUCCINATE 37.5 MG: 25 TABLET, EXTENDED RELEASE ORAL at 09:34

## 2020-07-30 RX ADMIN — POTASSIUM CHLORIDE 20 MEQ: 1500 TABLET, EXTENDED RELEASE ORAL at 09:34

## 2020-07-30 RX ADMIN — FUROSEMIDE 20 MG: 10 INJECTION, SOLUTION INTRAMUSCULAR; INTRAVENOUS at 17:26

## 2020-07-30 RX ADMIN — SODIUM CHLORIDE, PRESERVATIVE FREE 10 ML: 5 INJECTION INTRAVENOUS at 20:55

## 2020-07-30 RX ADMIN — POTASSIUM CHLORIDE 20 MEQ: 1500 TABLET, EXTENDED RELEASE ORAL at 17:26

## 2020-07-30 RX ADMIN — ROSUVASTATIN CALCIUM 5 MG: 5 TABLET, FILM COATED ORAL at 20:55

## 2020-07-30 RX ADMIN — PANTOPRAZOLE SODIUM 40 MG: 40 TABLET, DELAYED RELEASE ORAL at 06:15

## 2020-07-30 RX ADMIN — FUROSEMIDE 20 MG: 10 INJECTION, SOLUTION INTRAMUSCULAR; INTRAVENOUS at 09:34

## 2020-07-30 RX ADMIN — TAMSULOSIN HYDROCHLORIDE 0.4 MG: 0.4 CAPSULE ORAL at 09:34

## 2020-07-30 RX ADMIN — METOPROLOL SUCCINATE 37.5 MG: 25 TABLET, EXTENDED RELEASE ORAL at 20:56

## 2020-07-30 ASSESSMENT — PAIN SCALES - GENERAL
PAINLEVEL_OUTOF10: 0

## 2020-07-30 NOTE — PROGRESS NOTES
[facility]  Internal Medicine Residency / [address]    Attending Physician Statement  I have discussed the case, including pertinent history and exam findings with the resident and the team.  I have seen and examined the patient and the key elements of the encounter have been performed by me. I agree with the assessment, plan and orders as documented by the resident. Occasional GERD, used bedside commode, pain in LLE somewhat improved. 1.  Dyspnea on exertion from acute on chronic diastolic heart failure with bilateral pleural effusion  2.  A. fib with RVR, improved rate control  3.  Hemoptysis, possibly from pulmonary edema  4.  Acute cystitis history of BPH  5.  Hx of melanotic stools  6. CKD stage III  7.  Normocytic anemia    Await pleural fluid analysis (blood tinged specimen), post xray shows improvement in effusion. Continue IV diuresis, start Carafate for ongoing GERD/gastritis. GI consultation reviewed. Remainder of medical problems as per resident note.       Cyndie Hernandez,   Internal Medicine Residency Faculty

## 2020-07-30 NOTE — PROGRESS NOTES
[facility]  Internal Medicine Residency Program  Progress Note - House Team    Patient:  Zoë Gallardo 80 y.o. male MRN: [id number]     Date of Service: 7/30/2020     CC: Worsening chest tightness and SOB w/ exertion and bilateral LE edema  Overnight events: No overnight events reported. Subjective     Patient seen and examined at the bedside. He reports he is content and about the same as the previous day. He is eating well with good appetite. He reports some increased SOB overnight from previous days. He continues to endorse the same level of chest tightness and cough as previous days but denies additional hemoptysis. He reports that the GI cocktail did improve his burning chest and epigastric pain but that it is still present. Patient reports he did have a bowel movement this morning, his first since admission. He denies fevers, chills, palpitations, abdominal pain, nausea, vomiting, dysuria, or difficulties with urination. Objective     Physical Exam:  · Vitals: BP (!) 106/59   Pulse 91   Temp 97.4 °F (36.3 °C) (Temporal)   Resp 18   Ht 6' (1.829 m)   Wt 206 lb 7 oz (93.6 kg)   SpO2 98%   BMI 28.00 kg/m²     · I & O - 24hr: No intake/output data recorded. · General Appearance: alert, appears stated age and cooperative  · HEENT:  Head: Normal, normocephalic, atraumatic. · Neck: no JVD and supple, symmetrical, trachea midline  · Lung: clear to auscultation bilaterally and diminished breath sounds bibasilar  · Heart: irregularly irregular rhythm. Radial pulses 2+ w/ irregularly irregular rhythm. · Abdomen: soft, non-tender; bowel sounds normal; no masses,  no organomegaly  · Extremities:  extremities normal, atraumatic, no cyanosis or edema  · Musculokeletal: Left knee pain.   · Neurologic: Mental status: Alert, oriented, thought content appropriate  Subject  Pertinent Labs & Imaging Studies   rosemary  CBC:   Lab Results   Component Value Date    WBC 4.9 07/30/2020    RBC 4.53 07/30/2020    HGB 12.7 07/30/2020    HCT 39.2 07/30/2020    MCV 86.5 07/30/2020    MCH 28.0 07/30/2020    MCHC 32.4 07/30/2020    RDW 15.5 07/30/2020     07/30/2020    MPV 9.4 07/30/2020     CMP:    Lab Results   Component Value Date     07/30/2020    K 4.3 07/30/2020    CL 98 07/30/2020    CO2 25 07/30/2020    BUN 28 07/30/2020    CREATININE 1.4 07/30/2020    GFRAA 58 07/30/2020    LABGLOM 48 07/30/2020    GLUCOSE 89 07/30/2020    PROT 6.5 07/30/2020    LABALBU 3.6 07/30/2020    CALCIUM 10.0 07/30/2020    BILITOT 1.1 07/30/2020    ALKPHOS 72 07/30/2020    AST 19 07/30/2020    ALT 13 07/30/2020       Resident's Assessment and Plan     Kerry Sands is a 80 y.o. male with a PMHx of CAD, afib, lymphoma, HLD, HTN, LYN in LAD, anemia, asthma, renal failure, and temporal arteritis who presented with the chief complaint of dyspnea and chest tightness on exertion as well as bilateral LE edema. Patient is being actively treated for persistent afib and thoracentesis for bilateral pleural effusion.      1. Exertional Dyspnea secondary to acute on chronic HF exacerbation              -ECHO 07/28-EF 60-65%. Normal LV. L atrium moderately dilated. Normal  RV. Trace mitral regurg. Mild aortic stenosis w/ valve area 1.7 sq cm. Trace tricuspid regurg. Normal estimate PA pressures. Mildly dilated  aortic root. Normal ascending aorta.               -elevated pro-BNP 4,876              -EKG 7/28-No significant changes to 07/27. afib w/ .              -Cards recommends continuing IV Lasix 20mg BID  2. Bilateral pleural effusions              -most likely secondary to HF exacerbation with CKD having an additive effect              -Pulmonology consult: Thoracentesis to be performed today, 07/30. Removed  approximately 800 cc of rust-colored fluid from R lung.     Patient off of Elliquis since evening of 7/26.  3. R/o CAP              -Patient remaining afebrile and WBC steady at 5.0.                -Viral respiratory panel negative.              -CRP, procalcitonin 0.08              -Respiratory cultures sent. 4.CKD stage IIIa              -Urine electrolytes: Na 129, K 22.2, Cl 113, Cr 40              -Bun and creatinine elevated but improving at 28 and 1.4 respectively. 5. Hx afib:               Reduce home dose of diltiazem from 180mg to 60mg BID. Inpatient switched diltiazem to Metoprolol Succinate (Toprol XL) ER 37.5mg  BID. Was given one dose of digoxin 250mcg last evening.               Elliquis continued to be held for thoracentesis. No additional episodes of  hemoptysis.                Tachycardia and hypotensive present but improved from yesterday. 6. Urinary Tract Infection:              UA was positive for nitrites, large leuk esterase              Microscopic UA = WBC > 20, mod bacteria              Rocephin 1g d/c              Urine Cx: <10,000 CFU/mL, gram - rods, mixed gram +  7. Left calf pain in setting of imobility and SOB              Wells score 3 with new onset hempotysis              D-dimer+elevated 1521              US Doppler showed no evidence to support DVT. 8. Burning chest and epigastric pain:              -GI cocktail given 7/29, patient reports improvement. -Gave carafate 1g 4x/day. Will monitor for improvement. 9. HLD              -Continue rosuvastatin   10. Reported melena              -Anemia panel-demonstrating anemia of chronic disease pattern: Iron  decreased 37, TIBC decreased 185, Iron sat 20%              -GI consult: reports prior symptoms not melena or acute blood loss.    Outpatient EGD at minimum needed given symptoms     PT/OT evaluation:  DVT prophylaxis/ GI prophylaxis: Eliquis held, begin SCV/ Protonix  Disposition: Has been accepted to [address] [place] [address] for rehab at discharge.  [address], [address]  Attending physician: Dr. Darlene Sandoval

## 2020-07-30 NOTE — PROGRESS NOTES
Physical Therapy    Patient is currently on PT caseload and is currently off unit for procedure. Will continue with current POC.    David Nixon, PT, DPT  License [id number]

## 2020-07-30 NOTE — PROGRESS NOTES
Stools chronically dark and apparently confirmed Heme positive outpatient with plans for evaluation but not completed  Extensive PMHx  Was on [facility] prehospitalization  Has lost weight ? 20 #  Burning epigastric pain responsive to unnamed OTC medication  Hgb is not changed over 8 months and MCV is normal    He may have dark stools and he may be heme positive but this is not melena or acute blood loss.   EGD at a minimum needed given sx , heme positive stool and weight loss but other priorities first.

## 2020-07-31 LAB
ALBUMIN SERPL-MCNC: 3.4 G/DL (ref 3.5–5.2)
ALP BLD-CCNC: 72 U/L (ref 40–129)
ALT SERPL-CCNC: 12 U/L (ref 0–40)
ANION GAP SERPL CALCULATED.3IONS-SCNC: 12 MMOL/L (ref 7–16)
APPEARANCE FLUID: NORMAL
AST SERPL-CCNC: 16 U/L (ref 0–39)
BASOPHILS ABSOLUTE: 0.02 E9/L (ref 0–0.2)
BASOPHILS RELATIVE PERCENT: 0.3 % (ref 0–2)
BILIRUB SERPL-MCNC: 0.9 MG/DL (ref 0–1.2)
BUN BLDV-MCNC: 38 MG/DL (ref 8–23)
CALCIUM SERPL-MCNC: 9.7 MG/DL (ref 8.6–10.2)
CELL COUNT FLUID TYPE: NORMAL
CHLORIDE BLD-SCNC: 97 MMOL/L (ref 98–107)
CO2: 27 MMOL/L (ref 22–29)
COLOR FLUID: NORMAL
CREAT SERPL-MCNC: 1.5 MG/DL (ref 0.7–1.2)
EOSINOPHILS ABSOLUTE: 0.34 E9/L (ref 0.05–0.5)
EOSINOPHILS RELATIVE PERCENT: 5.6 % (ref 0–6)
FLUID TYPE: NORMAL
GFR AFRICAN AMERICAN: 53
GFR NON-AFRICAN AMERICAN: 44 ML/MIN/1.73
GLUCOSE BLD-MCNC: 98 MG/DL (ref 74–99)
GLUCOSE, FLUID: 88 MG/DL
HCT VFR BLD CALC: 39.3 % (ref 37–54)
HEMOGLOBIN: 12.6 G/DL (ref 12.5–16.5)
IMMATURE GRANULOCYTES #: 0.02 E9/L
IMMATURE GRANULOCYTES %: 0.3 % (ref 0–5)
LD, FLUID: 211 U/L
LYMPHOCYTES ABSOLUTE: 1.2 E9/L (ref 1.5–4)
LYMPHOCYTES RELATIVE PERCENT: 19.6 % (ref 20–42)
MCH RBC QN AUTO: 27.7 PG (ref 26–35)
MCHC RBC AUTO-ENTMCNC: 32.1 % (ref 32–34.5)
MCV RBC AUTO: 86.4 FL (ref 80–99.9)
MONOCYTE, FLUID: 90 %
MONOCYTES ABSOLUTE: 0.74 E9/L (ref 0.1–0.95)
MONOCYTES RELATIVE PERCENT: 12.1 % (ref 2–12)
NEUTROPHIL, FLUID: 10 %
NEUTROPHILS ABSOLUTE: 3.8 E9/L (ref 1.8–7.3)
NEUTROPHILS RELATIVE PERCENT: 62.1 % (ref 43–80)
NUCLEATED CELLS FLUID: 678 /UL
PDW BLD-RTO: 15.4 FL (ref 11.5–15)
PLATELET # BLD: 197 E9/L (ref 130–450)
PMV BLD AUTO: 8.6 FL (ref 7–12)
POTASSIUM REFLEX MAGNESIUM: 4.8 MMOL/L (ref 3.5–5)
PROTEIN FLUID: 3.5 G/DL
RBC # BLD: 4.55 E12/L (ref 3.8–5.8)
RBC FLUID: NORMAL /UL
SARS-COV-2: NOT DETECTED
SODIUM BLD-SCNC: 136 MMOL/L (ref 132–146)
SODIUM URINE: 34 MMOL/L
SOURCE: NORMAL
TOTAL PROTEIN: 6.4 G/DL (ref 6.4–8.3)
WBC # BLD: 6.1 E9/L (ref 4.5–11.5)

## 2020-07-31 PROCEDURE — 80053 COMPREHEN METABOLIC PANEL: CPT

## 2020-07-31 PROCEDURE — 84300 ASSAY OF URINE SODIUM: CPT

## 2020-07-31 PROCEDURE — 2580000003 HC RX 258: Performed by: STUDENT IN AN ORGANIZED HEALTH CARE EDUCATION/TRAINING PROGRAM

## 2020-07-31 PROCEDURE — 99232 SBSQ HOSP IP/OBS MODERATE 35: CPT | Performed by: INTERNAL MEDICINE

## 2020-07-31 PROCEDURE — 85025 COMPLETE CBC W/AUTO DIFF WBC: CPT

## 2020-07-31 PROCEDURE — 88184 FLOWCYTOMETRY/ TC 1 MARKER: CPT

## 2020-07-31 PROCEDURE — 88185 FLOWCYTOMETRY/TC ADD-ON: CPT

## 2020-07-31 PROCEDURE — 97530 THERAPEUTIC ACTIVITIES: CPT

## 2020-07-31 PROCEDURE — 2060000000 HC ICU INTERMEDIATE R&B

## 2020-07-31 PROCEDURE — 36415 COLL VENOUS BLD VENIPUNCTURE: CPT

## 2020-07-31 PROCEDURE — 6370000000 HC RX 637 (ALT 250 FOR IP): Performed by: INTERNAL MEDICINE

## 2020-07-31 PROCEDURE — 6370000000 HC RX 637 (ALT 250 FOR IP): Performed by: STUDENT IN AN ORGANIZED HEALTH CARE EDUCATION/TRAINING PROGRAM

## 2020-07-31 PROCEDURE — 97535 SELF CARE MNGMENT TRAINING: CPT

## 2020-07-31 PROCEDURE — 99233 SBSQ HOSP IP/OBS HIGH 50: CPT | Performed by: INTERNAL MEDICINE

## 2020-07-31 PROCEDURE — 2700000000 HC OXYGEN THERAPY PER DAY

## 2020-07-31 RX ORDER — FUROSEMIDE 40 MG/1
40 TABLET ORAL DAILY
Status: DISCONTINUED | OUTPATIENT
Start: 2020-07-31 | End: 2020-08-02

## 2020-07-31 RX ADMIN — Medication 1000 MCG: at 08:41

## 2020-07-31 RX ADMIN — ASPIRIN 81 MG: 81 TABLET, COATED ORAL at 11:05

## 2020-07-31 RX ADMIN — SODIUM CHLORIDE, PRESERVATIVE FREE 10 ML: 5 INJECTION INTRAVENOUS at 20:52

## 2020-07-31 RX ADMIN — APIXABAN 2.5 MG: 5 TABLET, FILM COATED ORAL at 15:06

## 2020-07-31 RX ADMIN — POTASSIUM CHLORIDE 20 MEQ: 1500 TABLET, EXTENDED RELEASE ORAL at 08:41

## 2020-07-31 RX ADMIN — APIXABAN 2.5 MG: 5 TABLET, FILM COATED ORAL at 20:51

## 2020-07-31 RX ADMIN — SUCRALFATE 1 G: 1 TABLET ORAL at 11:05

## 2020-07-31 RX ADMIN — PANTOPRAZOLE SODIUM 40 MG: 40 TABLET, DELAYED RELEASE ORAL at 05:31

## 2020-07-31 RX ADMIN — ROSUVASTATIN CALCIUM 5 MG: 5 TABLET, FILM COATED ORAL at 20:52

## 2020-07-31 RX ADMIN — SODIUM CHLORIDE, PRESERVATIVE FREE 10 ML: 5 INJECTION INTRAVENOUS at 08:42

## 2020-07-31 RX ADMIN — TAMSULOSIN HYDROCHLORIDE 0.4 MG: 0.4 CAPSULE ORAL at 08:41

## 2020-07-31 RX ADMIN — POTASSIUM CHLORIDE 20 MEQ: 1500 TABLET, EXTENDED RELEASE ORAL at 17:55

## 2020-07-31 RX ADMIN — FUROSEMIDE 40 MG: 40 TABLET ORAL at 11:05

## 2020-07-31 RX ADMIN — METOPROLOL SUCCINATE 37.5 MG: 25 TABLET, EXTENDED RELEASE ORAL at 08:41

## 2020-07-31 RX ADMIN — SUCRALFATE 1 G: 1 TABLET ORAL at 00:34

## 2020-07-31 RX ADMIN — FOLIC ACID 1 MG: 1 TABLET ORAL at 08:41

## 2020-07-31 RX ADMIN — SUCRALFATE 1 G: 1 TABLET ORAL at 05:31

## 2020-07-31 RX ADMIN — SUCRALFATE 1 G: 1 TABLET ORAL at 17:55

## 2020-07-31 ASSESSMENT — PAIN DESCRIPTION - PAIN TYPE: TYPE: CHRONIC PAIN

## 2020-07-31 ASSESSMENT — PAIN DESCRIPTION - PROGRESSION
CLINICAL_PROGRESSION: NOT CHANGED
CLINICAL_PROGRESSION: NOT CHANGED

## 2020-07-31 ASSESSMENT — PAIN DESCRIPTION - FREQUENCY: FREQUENCY: INTERMITTENT

## 2020-07-31 ASSESSMENT — PAIN SCALES - GENERAL
PAINLEVEL_OUTOF10: 0
PAINLEVEL_OUTOF10: 3

## 2020-07-31 ASSESSMENT — PAIN DESCRIPTION - ORIENTATION: ORIENTATION: MID;UPPER

## 2020-07-31 ASSESSMENT — PAIN DESCRIPTION - DESCRIPTORS: DESCRIPTORS: ACHING;DISCOMFORT

## 2020-07-31 ASSESSMENT — PAIN DESCRIPTION - LOCATION: LOCATION: CHEST

## 2020-07-31 ASSESSMENT — PAIN DESCRIPTION - ONSET: ONSET: ON-GOING

## 2020-07-31 NOTE — FLOWSHEET NOTE
07/31/20 1932   Encounter Summary   Services provided to: Patient   Referral/Consult From: [facility] Family members   Continue Visiting   (7/31)   Complexity of Encounter Moderate   Spiritual Assessment Completed Yes   Advance Care Planning Yes   Routine   Type Follow up   Spiritual/Temple   Type Spiritual support   Assessment Approachable;Coping; Anxious   Intervention Explored feelings, thoughts, concerns; Active listening;Prayer;Nurtured hope;Sustaining presence/ Ministry of presence; Discussed belief system/Orthodox practices/lashell;Discussed illness/injury and it's impact   Outcome Comfort;Expressed gratitude;Engaged in conversation;Expressed feelings/needs/concerns   Advance Directives (For Healthcare)   Healthcare Directive No, patient does not have an advance directive for healthcare treatment   Information on Healthcare Directives Requested No   Patient Requests Assistance No   Advance Directives Pt. not interested at this time     Patient does not wish healthcare directives, stating his daughter knows what to do.

## 2020-07-31 NOTE — PROGRESS NOTES
Pulmonary [address] [facility]                             Pulmonary Consult/Progress Note :              Consulting Physician:Bilateral Pleural effusion       Reason for Consultation:bilateral effusion   CC : sob   HPI:   Doing very well   SOB has improved and no fever or chills  Off Eliquis   No chest pain       PHYSICAL EXAMINATION:     VITAL SIGNS:  BP (!) 123/59   Pulse 100   Temp 97.5 °F (36.4 °C) (Temporal)   Resp 16   Ht 6' (1.829 m)   Wt 206 lb 7 oz (93.6 kg)   SpO2 96%   BMI 28.00 kg/m²   Wt Readings from Last 3 Encounters:   07/30/20 206 lb 7 oz (93.6 kg)   10/31/19 220 lb (99.8 kg)   10/14/19 224 lb (101.6 kg)     Temp Readings from Last 3 Encounters:   07/30/20 97.5 °F (36.4 °C) (Temporal)     TMAX:  BP Readings from Last 3 Encounters:   07/30/20 (!) 123/59   10/31/19 118/60   10/14/19 120/68     Pulse Readings from Last 3 Encounters:   07/30/20 100   10/31/19 78   10/14/19 100           INTAKE/OUTPUTS:  I/O last 3 completed shifts:   In: 400 [P.O.:400]  Out: 1500 [Urine:1500]    Intake/Output Summary (Last 24 hours) at 7/30/2020 2030  Last data filed at 7/30/2020 1125  Gross per 24 hour   Intake 120 ml   Output 800 ml   Net -680 ml       General Appearance: alert and oriented to person, place and time, well-developed and   well-nourished, in no acute distress   Eyes: pupils equal, round, and reactive to light, extraocular eye movements intact, conjunctivae normal and sclera anicteric   Neck: neck supple and non tender without mass, no thyromegaly, no thyroid nodules and no cervical adenopathy   Pulmonary/Chest:decrease braeth sound bilateral    Cardiovascular: normal rate, regular rhythm, normal S1 and S2, no murmurs, rubs, clicks or gallops, distal pulses intact, no carotid bruits, no murmurs, no gallops, no carotid bruits and no JVD   Abdomen: obese, soft, non-tender, non-distended, normal bowel sounds, no masses or organomegaly   Extremities no edema Musculoskeletal: normal range of motion, no joint swelling, deformity or tenderness   Neurologic: reflexes normal and symmetric, no cranial nerve deficit noted    LABS/IMAGING:    CBC:  Lab Results   Component Value Date    WBC 4.9 07/30/2020    HGB 12.7 07/30/2020    HCT 39.2 07/30/2020    MCV 86.5 07/30/2020     07/30/2020    LYMPHOPCT 24.2 07/30/2020    RBC 4.53 07/30/2020    MCH 28.0 07/30/2020    MCHC 32.4 07/30/2020    RDW 15.5 (H) 07/30/2020    NEUTOPHILPCT 57.3 07/30/2020    MONOPCT 12.6 (H) 07/30/2020    BASOPCT 0.4 07/30/2020    NEUTROABS 2.81 07/30/2020    LYMPHSABS 1.19 (L) 07/30/2020    MONOSABS 0.62 07/30/2020    EOSABS 0.24 07/30/2020    BASOSABS 0.02 07/30/2020       Recent Labs     07/30/20  0451 07/29/20 0618 07/28/20  0425   WBC 4.9 5.0 4.6   HGB 12.7 11.7* 11.4*   HCT 39.2 36.4* 35.4*   MCV 86.5 86.7 85.7    189 192       BMP:   Recent Labs     07/28/20  0425 07/29/20 0618 07/30/20  0451    138 138   K 4.3 4.3 4.3   CL 98 97* 98   CO2 27 29 25   BUN 32* 26* 28*   CREATININE 1.4* 1.5* 1.4*       MG: No results found for: MG  Ca/Phos:   Lab Results   Component Value Date    CALCIUM 10.0 07/30/2020     Amylase: No results found for: AMYLASE  Lipase: No results found for: LIPASE  LIVER PROFILE:   Recent Labs     07/28/20  0425 07/29/20 0618 07/30/20  0451   AST 15 17 19   ALT 10 10 13   BILITOT 1.0 0.9 1.1   ALKPHOS 71 68 72       PT/INR:   No results for input(s): PROTIME, INR in the last 72 hours. APTT:   No results for input(s): APTT in the last 72 hours. Cardiac Enzymes:  Lab Results   Component Value Date    CKMB 2.1 07/28/2020    TROPONINI 0.01 07/28/2020                   PROBLEM LIST:  Patient Active Problem List   Diagnosis    Atrial fibrillation ([facility]    Anemia    Lymphoma ([facility]    Hyperlipidemia    Hypertension    CAD (coronary artery disease)    Presence of drug coated stent in LAD coronary artery    Obesity (BMI 30.0-34. 9)    Congest[facility] failure ([facility]    Acute diastolic heart failure with preserved ejection fraction (HCC)    Bilateral pleural effusion    Stage 3 chronic kidney disease (HCC)    Hemoptysis               ASSESSMENT:  1.) Bilateral  pleural effusion Right more than left   2.)Medistinal adenopathy   3. )CHF   4.)Possible COPD   5. )VESTA/      PLAN:  *-S/P Thoracentesis ,.developed chest pain ,could not finish his an drain all fluid I am concern he already developed entrapped lung ,if fluid comes will drain next time with  Slowly with pigtail   Need aggressive diuresis   *-will send for flow cytometry  *-diuresis per primary  *-BD   *_NIMV if in any distress    To me seems CHF and need diuresis and lymph nodes can be related ,but given his history of lymphoma . will need to to proceed with EBUS or PET scan as OP         Thank you very much for allowing me to participate in the care of this pleasant patient , should you have any questions ,please do not hesitate to contact me      Dmitry Murguia MD,FCCP  Pulmonary&Critical Care Medicine   Director of [address] [place] [address] Director of [address] [place] [address]   Professor Gonzalo Reis    NOTE: This report was transcribed using voice recognition software. Every effort was made to ensure accuracy; however, inadvertent computerized transcription errors may be present.

## 2020-07-31 NOTE — PROGRESS NOTES
mg BID   Toprol XL 37.5 mg BID    3. CKD stage 3  Creatinine 1.5, stable since admission.  Follow BMP    4. Normocytic anemia  Hgb normal last few days. 12.6 today. Patient reports dark and tarry stools. Per GI, unlikely to have been having melena as his hct has been stable.  Plan for EGD in the near future    5. GERD  Has substernal burning. Improved with Carafate. · Continue Carafate    6. UTI  Hx of BPH. UA was positive for WBC, nitrites, and bacteria. Asymptomatic. Was on Ceftriaxone, discontinued. · Flomax 0.4 mg daily    7. HLD  · Rosuvastatin 5 mg QHS    8.  Hx of lymphoma    PT/OT evaluation: PT [facility] 13/24, OT [facility] 15/24  DVT prophylaxis/GI prophylaxis: Eliquis/Protonix  Disposition: Continue current care    Meagan Clark MD,  PGY-1  Attending Physician: Dr. Annetta Schumacher

## 2020-07-31 NOTE — PLAN OF CARE
Problem:  Activity:  Goal: Capacity to carry out activities will improve  Description: Capacity to carry out activities will improve  Outcome: Ongoing  Goal: Will verbalize the importance of balancing activity with adequate rest periods  Description: Will verbalize the importance of balancing activity with adequate rest periods  Outcome: Ongoing     Problem: Cardiac:  Goal: Hemodynamic stability will improve  Description: Hemodynamic stability will improve  Outcome: Met This Shift  Goal: Ability to maintain an adequate cardiac output will improve  Description: Ability to maintain an adequate cardiac output will improve  Outcome: Met This Shift     Problem: Pain:  Goal: Pain level will decrease  Description: Pain level will decrease  Outcome: Met This Shift  Goal: Control of acute pain  Description: Control of acute pain  Outcome: Met This Shift  Goal: Control of chronic pain  Description: Control of chronic pain  Outcome: Met This Shift     Problem: Skin Integrity:  Goal: Will show no infection signs and symptoms  Description: Will show no infection signs and symptoms  Outcome: Met This Shift  Goal: Absence of new skin breakdown  Description: Absence of new skin breakdown  Outcome: Met This Shift

## 2020-07-31 NOTE — PROGRESS NOTES
INPATIENT CARDIOLOGY FOLLOW-UP    Name: Nereyda Le    Age: 80 y.o. Date of Admission: 7/27/2020  8:13 AM    Date of Service: 7/30/2020    Chief Complaint: Follow-up for CHF and A. Fib    Interim History:  No new overnight cardiac complaints at rest but he gets winded with minimal exertion. . Currently with no complaints of CP, SOB, palpitations, dizziness, or lightheadedness. SR on telemetry. Review of Systems:   Cardiac: As per HPI  General: No fever, chills  Pulmonary: As per HPI  HEENT: No visual disturbances, difficult swallowing  GI: No nausea, vomiting  Endocrine: No thyroid disease or DM  Musculoskeletal: ALEJANDRE x 4, no focal motor deficits  Skin: Intact, no rashes  Neuro/Psych: No headache or seizures    Problem List:  Patient Active Problem List   Diagnosis    Atrial fibrillation ([facility]    Anemia    Lymphoma ([facility]    Hyperlipidemia    Hypertension    CAD (coronary artery disease)    Presence of drug coated stent in LAD coronary artery    Obesity (BMI 30.0-34. 9)    Congestive heart failure (HCC)    Acute diastolic heart failure with preserved ejection fraction (HCC)    Bilateral pleural effusion    Stage 3 chronic kidney disease (HCC)    Hemoptysis       Allergies:   Allergies   Allergen Reactions    Zocor [Simvastatin] Other (See Comments)     Caused muscle aches       Current Medications:  Current Facility-Administered Medications   Medication Dose Route Frequency Provider Last Rate Last Dose    sucralfate (CARAFATE) tablet 1 g  1 g Oral 4 times per day Eliazar Carter MD   1 g at 07/30/20 1726    metoprolol succinate (TOPROL XL) extended release tablet 37.5 mg  37.5 mg Oral BID Aurea Antoine MD   37.5 mg at 07/30/20 0934    aluminum & magnesium hydroxide-simethicone (MAALOX) 30 mL, lidocaine viscous hcl (XYLOCAINE) 5 mL (GI COCKTAIL)   Oral Once Adrianna Mckeon MD        perflutren lipid microspheres (DEFINITY) injection 1.65 mg  1.5 mL Intravenous ONCE PRN Aurea Antoine MD Intact, no rash  Head: Normocephalic, atraumatic  Eyes: EOMI, no conjunctival erythema  ENMT: No pharyngeal erythema, MMM, no rhinorrhea  Neck: Supple, no elevated JVP, no carotid bruits  Lungs: Clear to auscultation bilaterally. No wheezes, rales, or rhonchi. Cardiac: Regular rate and rhythm, +S1S2, no murmurs apparent  Abdomen: Soft, nontender, +bowel sounds  Extremities: Moves all extremities x 4, no lower extremity edema  Neurologic: No focal motor deficits apparent, normal mood and affect  Peripheral Pulses: Intact posterior tibial pulses bilaterally    Intake/Output:    Intake/Output Summary (Last 24 hours) at 7/30/2020 2012  Last data filed at 7/30/2020 1125  Gross per 24 hour   Intake 300 ml   Output 1500 ml   Net -1200 ml     No intake/output data recorded.     Laboratory Tests:  Recent Labs     07/28/20 0425 07/29/20 0618 07/30/20 0451    138 138   K 4.3 4.3 4.3   CL 98 97* 98   CO2 27 29 25   BUN 32* 26* 28*   CREATININE 1.4* 1.5* 1.4*   GLUCOSE 110* 99 89   CALCIUM 10.0 10.0 10.0     No results found for: MG  Recent Labs     07/28/20 0425 07/29/20 0618 07/30/20  0451   ALKPHOS 71 68 72   ALT 10 10 13   AST 15 17 19   PROT 6.2* 6.2* 6.5   BILITOT 1.0 0.9 1.1   LABALBU 3.6 3.4* 3.6     Recent Labs     07/28/20 0425 07/29/20 0618 07/30/20  0451   WBC 4.6 5.0 4.9   RBC 4.13 4.20 4.53   HGB 11.4* 11.7* 12.7   HCT 35.4* 36.4* 39.2   MCV 85.7 86.7 86.5   MCH 27.6 27.9 28.0   MCHC 32.2 32.1 32.4   RDW 15.6* 15.6* 15.5*    189 208   MPV 9.0 9.1 9.4     Lab Results   Component Value Date    CKMB 2.1 07/28/2020    TROPONINI 0.01 07/28/2020    TROPONINI 0.03 07/28/2020    TROPONINI 0.02 07/27/2020     Lab Results   Component Value Date    INR 1.2 07/27/2020    PROTIME 14.0 (H) 07/27/2020     Lab Results   Component Value Date    TSH 1.880 07/27/2020     No results found for: LABA1C  No results found for: EAG  No results found for: CHOL  No results found for: TRIG  No results found for: HDL  No results found for: LDLCALC, LDLCHOLESTEROL  No results found for: LABVLDL, VLDL  No results found for: CHOLHDLRATIO    Cardiac Tests:  Telemetry findings reviewed: Atrial fibrillation with heart rate in the 100s no new tachy/bradyarrhythmias overnight     EKG: Atrial fibrillation with RVR, low QRS complexes, nonspecific ST abnormality, abnormal EKG.    Vitals: Blood pressure 131/75 with heart rate of 120 temp 98.4 O2 sats 98%>>>101/54, >>> 123/59 with heart rate of 100. Sats 96%     Labs were reviewed: BUN/creatinine 32/1.4>> 26/1.5>> 28 1.4 rest of the BMP normal, proBNP 4876, troponins x3-. Liver function normal.  Urinalysis was positive for UTI.  H&H 11.4/35.4>> 11.7/36.4,  WBC 4.6, platelets 903. Serum iron 37 TIBC 185. CBC was normal.     TTE-6/24/2019 ([facility]): LVEF 55% -5%, indeterminate LV diastolic function due to A. fib, RV size normal with normal RV function, LA severely dilated, dilated aorta, moderate aortic insufficiency, mild MR and TR. Moderate concentric LVH. TTE-7/28/2020  Summary   Normal left ventricle size and systolic function. Ejection fraction is visually estimated at 60-65%. Atrial fibrillation may   affect the evaluation of LV systolic function. No regional wall motion abnormalities seen. Normal left ventricle wall thickness. Indeterminate diastolic function. The left atrium is moderately dilated. Normal right ventricular size and function. TAPSE 196 mm. Physiologic and/or trace mitral regurgitation is present. There is mild aortic stenosis with valve area of 1.7 sq cm. Physiologic and/or trace tricuspid regurgitation. RVSP is 31 mmHg. Normal estimated PA systolic pressure. Mildly dilated aortic root (4.0 cm) and normal sized ascending aorta. No evidence for hemodynamically significant pericardial effusion.    No previous echo for comparison.          Lexiscan nuclear stress test-June 2018 ([facility]): LVEF 52%, there is resting perfusion abnormality in the

## 2020-07-31 NOTE — PROGRESS NOTES
Patient saying that he needs his oxygen- which he reports that he takes at home (unable to tell how much he wears at home). Spo2 is 96% on room air. RN attempted to educate, but patient insistent. 2 liters of oxygen nasal cannula applied for comfort at this time. Once applied, patient says that he feels a relief.

## 2020-07-31 NOTE — PROGRESS NOTES
OT BEDSIDE TREATMENT NOTE      Date:2020  Patient Name: Joan Omalley  MRN: [id number]  : 1931  Room: [address] [place] [address]     Per OT Eval:    Referring Provider: Bob Corcoran DO      Evaluating OT: VALENTIN Carvajal/L #[id number]     AM-PAC Daily Activity Raw Score: 15/24     Recommended Adaptive Equipment: To be further assessed       Diagnosis: CHF       Pertinent Medical History: HTN, aFib, HTN,   Past Medical History        Past Medical History:   Diagnosis Date    Anemia      Atrial fibrillation (HCC)      Hyperlipidemia      Hypertension      Lymphoma ([facility]      Renal failure, acute (HCC)      Temporal arteritis (HCC)           Precautions:  Falls, bed alarm,      Home Living: questionable historian, Pt lives alone in an apt with 0 step(s) to enter and 0 rail(s); bed/bath on 1st   Bathroom setup: ?? Equipment owned: walker  Prior Level of Function: questionable historian ndependent  with ADLs , Independent  with IADLs; using walker for ambulation. Driving: no Son and daughter in law drive him and get groceriens     Pain Level: no pain initially, reported chest mild discomfort once upright in chair, then diminished once returned to bed, informed nsg.     Cognition: A&O: x 3, confusion noted through out, pt contradicts himself,  Follows 1 step directions,               Memory:  poor              Sequencing:  fair               Problem solving:  poor              Judgement/safety:  poor  Insight into his deficits, difficulty following commands, wants to complete tasks his way                 Functional Assessment:    Initial Eval Status  Date: 20 Treatment Status  Date:  20 STG/LTG  Frequency/duration  2-3x/week, 5-7 days   Feeding Stand by Assist   Set up Independent    Grooming Minimal Assist   Min A  seated Modified [place]    UB Dressing Moderate Assist   Min A  Seated  Stand by Assist    LB Dressing Maximal Assist   Max A  Donning socks bed level Stand by Assist    Bathing · Continue with current plan of care    Treatment Time In: 2:10           Treatment Time Out: 2:50              Treatment Charges: Mins Units   Ther Ex  33836     Manual Therapy Chrissie Grewal [id number] 2   ADL/Home Mgt [id number] 10 1   Neuro Re-ed [id number]     Group Therapy      Orthotic manage/training  [id number]     Non-Billable Time     Total Timed Treatment 40 3       Marni DOUGLASS  [address] [place] [address] Drive, [address] [place] [address]

## 2020-07-31 NOTE — PROGRESS NOTES
microspheres (DEFINITY) injection 1.65 mg  1.5 mL Intravenous ONCE PRN Manuelito Pastor MD        potassium chloride (KLOR-CON M) extended release tablet 20 mEq  20 mEq Oral BID WC Manuelito Pastor MD   20 mEq at 07/30/20 1726    sodium chloride flush 0.9 % injection 10 mL  10 mL Intravenous PRN Diana Vargas II, MD   10 mL at 07/28/20 1135    aspirin EC tablet 81 mg  81 mg Oral Daily Heather Ac MD   81 mg at 07/29/20 0859    pantoprazole (PROTONIX) tablet 40 mg  40 mg Oral QAM AC Heather Ac MD   40 mg at 66/97/09 0024    folic acid (FOLVITE) tablet 1 mg  1 mg Oral Daily Heather Ac MD   1 mg at 07/30/20 9395    rosuvastatin (CRESTOR) tablet 5 mg  5 mg Oral Nightly Heather Ac MD   5 mg at 07/30/20 2055    tamsulosin (FLOMAX) capsule 0.4 mg  0.4 mg Oral Daily Heather Ac MD   0.4 mg at 07/30/20 2070    vitamin B-12 (CYANOCOBALAMIN) tablet 1,000 mcg  1,000 mcg Oral Daily Heather Ac MD   1,000 mcg at 07/30/20 0933    sodium chloride flush 0.9 % injection 10 mL  10 mL Intravenous 2 times per day Heather Ac MD   10 mL at 07/30/20 2055    acetaminophen (TYLENOL) tablet 650 mg  650 mg Oral Q6H PRN Heather Ac MD        Or   Sylvester Levans acetaminophen (TYLENOL) suppository 650 mg  650 mg Rectal Q6H PRN Heather Ac MD        polyethylene glycol (GLYCOLAX) packet 17 g  17 g Oral Daily PRN Heather Ac MD        promethazine (PHENERGAN) tablet 12.5 mg  12.5 mg Oral Q6H PRN Heather Ac MD        Or    ondansetron (ZOFRAN) injection 4 mg  4 mg Intravenous Q6H PRN Heather Ac MD             Physical Exam:  BP (!) 98/40   Pulse 112   Temp 98.6 °F (37 °C) (Temporal)   Resp 18   Ht 6' (1.829 m)   Wt 198 lb 14.4 oz (90.2 kg)   SpO2 96%   BMI 26.98 kg/m²   Wt Readings from Last 3 Encounters:   07/31/20 198 lb 14.4 oz (90.2 kg)   10/31/19 220 lb (99.8 kg)   10/14/19 224 lb (101.6 kg)     Appearance: Awake, alert, no acute respiratory distress  Skin: Intact, no LDLCALC, LDLCHOLESTEROL  No results found for: LABVLDL, VLDL  No results found for: CHOLHDLRATIO    Cardiac Tests:  Telemetry findings reviewed: Atrial fibrillation with heart rate in the 90s no new tachy/bradyarrhythmias overnight     EKG: Atrial fibrillation with RVR, low QRS complexes, nonspecific ST abnormality, abnormal EKG.    Vitals: Blood pressure 131/75 with heart rate of 120 temp 98.4 O2 sats 98%>>>101/54, >>> 123/59 with heart rate of 100. Sats 96%>>98/40,      Labs were reviewed: BUN/creatinine 32/1.4>> 26/1.5>> 28 1.4>>38/1.5 rest of the BMP normal, proBNP 4876, troponins x3-. Liver function normal.  Urinalysis was positive for UTI.  H&H 11.4/35.4>> 11.7/36.4,  WBC 4.6, platelets 728. Serum iron 37 TIBC 185. CBC was normal.     TTE-6/24/2019 ([facility]): LVEF 55% -5%, indeterminate LV diastolic function due to A. fib, RV size normal with normal RV function, LA severely dilated, dilated aorta, moderate aortic insufficiency, mild MR and TR. Moderate concentric LVH. TTE-7/28/2020  Summary   Normal left ventricle size and systolic function. Ejection fraction is visually estimated at 60-65%. Atrial fibrillation may   affect the evaluation of LV systolic function. No regional wall motion abnormalities seen. Normal left ventricle wall thickness. Indeterminate diastolic function. The left atrium is moderately dilated. Normal right ventricular size and function. TAPSE 196 mm. Physiologic and/or trace mitral regurgitation is present. There is mild aortic stenosis with valve area of 1.7 sq cm. Physiologic and/or trace tricuspid regurgitation. RVSP is 31 mmHg. Normal estimated PA systolic pressure. Mildly dilated aortic root (4.0 cm) and normal sized ascending aorta. No evidence for hemodynamically significant pericardial effusion.    No previous echo for comparison.          Lexiscan nuclear stress test-June 2018 ([facility]): LVEF 52%, there is resting perfusion abnormality in the inferior septal wall consistent with a prior infarction without any reversible perfusion defect. Inferior wall hypokinesis.         Assessment:  · Shortness of breath multifactorial including diastolic heart failure, large bilateral pleural effusions. UOP 2.0 L with a net - for L, improving, status post thoracentesis and 900 mL of hemorrhagic fluid was removed. Cytology and fluid analysis is pending. He appears  euvolemic and does not appear to be volume overloaded, renal functions bumped up. · Atrial fibrillation, permanent with RVR, improving  · CAD status post PCI/LYN to mid LAD underwent 2009, now with history of inferior scar based on stress test done in 2018 at [facility]. · Hemoptysis, unclear etiology. · Hypertension, well controlled  · Hyperlipidemia on statin  · History of non-Hodgkin's lymphoma  · History of TIA  · GERD  · BPH  · UTI  · Melena rule out GI bleed with mild anemia        Plan:   · He had a thoracentesis today and will restart Eliquis today and if he is not going to for thoracentesis on the left. · Echocardiogram showed normal systolic function with indeterminate diastolic function most likely has some diastolic dysfunction. · Will change lasix to PO 40 mg po daily, check Pro-BNP level and spot urine sodium  · Management of urine tract infection as per primary service  · Bilateral venous Dopplers was negative for DVT. · Continue Toprol-XL to 37.5 mg p.o. twice daily for rate control. Cassandra Gutiérrez MD., Glenna Neves.   [facility]) Cardiology

## 2020-07-31 NOTE — PROGRESS NOTES
[facility]  Internal Medicine Residency Program  Progress Note - House Team    Patient:  Nicci Martinez 80 y.o. male MRN: [id number]     Date of Service: 7/31/2020     CC: Worsening chest tightness and SOB w/ exertion and bilateral LE edema  Overnight events: No acute events overnight. Subjective     Patient seen and examined at the bedside. He appeared well, sitting up in bed. He reported that he was still experiencing the epigastric discomfort, exacerbated by coughing. It was mildly improved by the Carafate. He also reported some new point tenderness to a specific rib region on the lateral, right rib cage. Patient was seen wearing oxygen via nasal canula. Overnight, he asked the nurse if he could use the oxygen because he does at home, at the time he was 96% on RA. When questioned how he feels when he needs oxygen, he reports \"just not right\" and that \"he knows when he needs it\". Otherwise he reports some decrease in SOB following the thoracentesis. Patient reports a good appetite and has been eating all of his meals. Denies any worsening in the cough or chest tightness. Denies fevers, chills, nausea, vomiting, or difficulty with urination. Had a bowel movement yesterday. Reports continued L leg pain, same as prior. Reports he is not able to walk at home. Objective     Physical Exam:  · Vitals: BP (!) 94/54   Pulse 93   Temp 97.5 °F (36.4 °C) (Temporal)   Resp 17   Ht 6' (1.829 m)   Wt 198 lb 14.4 oz (90.2 kg)   SpO2 99%   BMI 26.98 kg/m²     · I & O - 24hr: No intake/output data recorded. · General Appearance: alert, appears stated age and cooperative  · HEENT:  Head: Normal, normocephalic, atraumatic. · Neck: no JVD and supple, symmetrical, trachea midline  · Lung: diminished breath sounds bibasilar and RLL more prominent  · Heart: regular rate and rhythm, S1, S2 normal, no murmur, click, rub or gallop and irregularly irregular rhythm.   Radial pulse 2+ bilaterally. · Abdomen: soft, non-tender; bowel sounds normal; no masses,  no organomegaly  · Extremities:  edema absent. Much improved from prior. · Musculokeletal: DVT boots covering. Dorsalis pedis pulses 1+ bilaterally. · Neurologic: Mental status: Alert, oriented, thought content appropriate  Subject  Pertinent Labs & Imaging Studies   rosemary  CBC with Differential:    Lab Results   Component Value Date    WBC 6.1 07/31/2020    RBC 4.55 07/31/2020    HGB 12.6 07/31/2020    HCT 39.3 07/31/2020     07/31/2020    MCV 86.4 07/31/2020    MCH 27.7 07/31/2020    MCHC 32.1 07/31/2020    RDW 15.4 07/31/2020    LYMPHOPCT 19.6 07/31/2020    MONOPCT 12.1 07/31/2020    BASOPCT 0.3 07/31/2020    MONOSABS 0.74 07/31/2020    LYMPHSABS 1.20 07/31/2020    EOSABS 0.34 07/31/2020    BASOSABS 0.02 07/31/2020     CMP:    Lab Results   Component Value Date     07/31/2020    K 4.8 07/31/2020    CL 97 07/31/2020    CO2 27 07/31/2020    BUN 38 07/31/2020    CREATININE 1.5 07/31/2020    GFRAA 53 07/31/2020    LABGLOM 44 07/31/2020    GLUCOSE 98 07/31/2020    PROT 6.4 07/31/2020    LABALBU 3.4 07/31/2020    CALCIUM 9.7 07/31/2020    BILITOT 0.9 07/31/2020    ALKPHOS 72 07/31/2020    AST 16 07/31/2020    ALT 12 07/31/2020       Resident's Assessment and Plan     Nereyda Le is a 80 y.o. male with a PMHx of CAD, afib, lymphoma, HLD, HTN, LYN in LAD, anemia, asthma, renal failure, and temporal arteritis who presented with the chief complaint of dyspnea and chest tightness on exertion as well as bilateral LE edema.  Patient is being actively treated for persistent afib and thoracentesis for bilateral pleural effusion.      1. Exertional Dyspnea secondary to acute on chronic HF exacerbation             -ECHO 07/28-EF 60-65%.  Normal LV. L atrium moderately dilated.  Normal           RV. Trace mitral regurg. Mild aortic stenosis w/ valve area 1.7 sq cm.    Trace  tricuspid regurg. Normal estimate PA pressures.  Mildly dilated aortic root. Normal ascending aorta.               -elevated pro-BNP 4,876              -EKG 7/28-No significant changes to 07/27. afib w/ .              -Cards recommends: Lasix to 40mg PO QD. Continue Toprol-XL  37.5mg. Ordered a pro-BNP and spot Jessica.  2. Bilateral pleural effusions              -most likely secondary to HF exacerbation with CKD having an additive effect              -Pulmonology consult: Thoracentesis, 07/30. Removed        approximately 900 cc of rust-colored fluid from R lung. Fluid studies ordered. -If additional fluid drainage needed, next time will drain fluid slowly w/ pigtail. No further thoracentesis planned at this time. 3. R/o CAP              -MIWFNFD remaining afebrile and WBC steady at 6. 1.                -Viral respiratory panel negative.              -CRP, procalcitonin 0.08              -Respiratory cultures sent. 4.CKD stage IIIa              -Urine electrolytes: Na 129, K 22.2, Cl 113, Cr 40              -Bun and creatinine elevated but improving at 38 and 1.5 respectively. 5. Hx afib:               Reduce home dose of diltiazem from 180mg to 60mg BID. Inpatient switched diltiazem to Metoprolol Succinate (Toprol XL) ER 37.5mg          BID. Was given one dose of digoxin 250mcg.               Elliquis to be restarted 7/31.                Tachycardia and hypotensive present on occasion but improved. 6. Urinary Tract Infection:              UA was positive for nitrites, large leuk esterase              Microscopic UA = WBC > 20, mod bacteria              Rocephin 1g d/c              Urine Cx: <10,000 CFU/mL, gram - rods, mixed gram +  7. Left calf pain in setting of imobility and SOB              Wells score 3 with new onset hempotysis              D-dimer+elevated 1521              US Doppler showed no evidence to support DVT. 8. Burning chest and epigastric pain:              -GI cocktail given 7/29, patient reports improvement. -Gave carafate 7/31. Will monitor for improvement. -GI consult: at minimum, needs upper endoscopy prior to discharge. If not  priority, they will follow. 9. HLD              -Continue rosuvastatin   10. Reported melena              -Anemia panel-demonstrating anemia of chronic disease pattern: Iron        decreased 37, TIBC decreased 185, Iron sat 20%              -GI consult: reports prior symptoms not melena or acute blood loss. Outpatient EGD at minimum needed given symptoms     PT/OT evaluation: On PT/OT service.    DVT prophylaxis/ GI prophylaxis: Eliquis held, begin SCV/ Protonix  Disposition: Has been accepted to [address] [place] [address] for rehab at discharge.  [address]  Attending physician: Josseline Dueñas

## 2020-07-31 NOTE — CONSULTS
[address]                  [address] [place],  [facility]                                  CONSULTATION    PATIENT NAME: Buffy Orr                       :        1931  MED REC NO:   [id number]                            ROOM:       8517  ACCOUNT NO:   [de-identified]                           ADMIT DATE: 2020  PROVIDER:     Brent Griffin MD    CONSULT DATE:  2020    REASON FOR CONSULTATION:  Suspected melena. HISTORY OF PRESENT ILLNESS:  This is as 70-year-old male. He does have  a history of coronary artery disease and atrial fibrillation for which  he has undergone DC cardioversion in the past.  He is chronically  anticoagulated. He was admitted to the hospital with shortness of  breath. He does have a history apparently of underlying COPD. He has  been seen by Cardiology on this admission who has held his apixaban for  now. His hemoglobin on entry was 11.7. It is 12.7 today. It is  unchanged over the last 8 months. He has a degree of renal  insufficiency chronically with a BUN of 28 and creatinine of 1.4. He  suggests his stools are chronically dark. He states they were Hemoccult  positive as an outpatient. He was to be evaluated endoscopically or at  least was to have an appointment to have that discussion, though was  admitted to the hospital instead. He is chronically on a proton pump  inhibitor. He describes some discomfort in the upper mid abdomen  without recognized ulcer disease in the past.  There is a suggestion  that he has lost perhaps 20 pounds in weight. His care everywhere chart has been reviewed and most of that evaluation  has been primarily cardiac. There was a remote small bowel series done. No endoscopic evaluations were located. He is to have a thoracentesis  today with the discovery of pleural effusions on chest x-ray. He has  had no red blood per rectum.     PAST MEDICAL HISTORY:  Includes a history of non-Hodgkin's lymphoma,  chronic kidney disease, and he is listed as having temporal arteritis,  but lacks details. Coronary artery disease, atrial fibrillation, and DC  cardioversion. PAST SURGICAL HISTORY:  He has had a previous PTCA, hernia repair,  cataracts, and rotator cuff. SOCIAL HISTORY:  Legally . Former smoker. Denies alcohol. MEDICATIONS LISTED PRIOR TO ADMISSION:  Included aspirin, folic acid,  furosemide, metoprolol, Zofran, Flomax, albuterol, Disalcid, warfarin,  and omega-3 fatty acids. ALLERGIES:  There are allergies listed to SIMVASTATIN. PHYSICAL EXAMINATION:  GENERAL:  An elderly gentleman, 80years of age, looking younger, alert  and oriented without real pallor. NECK:  Veins are flat. CHEST:  Lung fields are clear. CARDIOVASCULAR:  Heart tones are normal.  There is a regular rate and  regular rhythm. No audible murmur or gallop. ABDOMEN:  Soft. Nontender. No hepatic or splenic enlargement. No  ascites or dependent edema. RECTAL:  A digital rectal exam was not done. ASSESSMENT:  He is certainly not having melena even if he is Hemoccult  positive given the stability of his hematocrit over time. He does have  symptoms despite the use of proton pump inhibitor therapy. He is due  for a thoracentesis, but prior to discharge at a minimum the upper GI  tract needs to be examined. It is not a priority, we will follow.         Jonathon العلي MD    D: 07/30/2020 22:32:11       T: 07/30/2020 22:40:55     [email or web address]  Job#: [id number]     Doc#: [id number]    CC:

## 2020-07-31 NOTE — PROGRESS NOTES
Physical Therapy  Treatment Note    Name: Cedric Farrell  : 1931  MRN: [id number]    Referring Provider:  Kate Sullivan DO    Date of Service: 2020    Evaluating PT:  Dennis Barone, PT, DPT [id number]    Room #:  4576/9230-M  Diagnosis:  Heart Failure  PMHx/PSHx:  Afib, anemia, renal failure, HTN, lymphoma, HLD  Precautions:  Falls  Equipment Needs:  [facility] [address] [email or web address] [employer] - pt owns    SUBJECTIVE:    Pt lives alone in an apartmetn with 0 stairs to enter and 0 rail. Bed is on first floor and bath is on first floor. Pt ambulated with [place] [address] [employer] Juanita PTA. Pt reports independence with all ADLs. Pt reports daughter and son in law provide transportation and groceries. OBJECTIVE:   Initial Evaluation  Date: 2020 Treatment  20 Short Term/ Long Term   Goals   AM-PAC 6 Clicks  78/06    Was pt agreeable to Eval/treatment? yes Yes    Does pt have pain? 10/10 LLE, 7/10 RLE None at rest; Mild chest discomfort with sitting    Bed Mobility  Rolling: Jason  Supine to sit: Jason  Sit to supine: Jason  Scooting: Jason Rolling: Min A   Supine to sit: SBA  Sit to supine: SBA  Scooting: SBA  Rolling: Independent  Supine to sit: Independent. Sit to supine: Independent  Scooting: Independent   Transfers Sit to stand: modA (lacked full knee extension)  Stand to sit: modA  Stand pivot: NT Sit to stand: Mod A- crouched posture   Stand to sit: Mod A  Stand pivot:  Mod A using [facility] [employer] [facility]x; Max A without WW for low sit pivot transfer   Sit to stand: SBA  Stand to sit: SBA  Stand pivot: SBA front [facility] [employer]   Ambulation    side steps towards [facility] with [address] [employer] modA NA due to poor safety 25 feet with [place] [address] [employer] negotiation: ascended and descended  NT  NA   ROM BUE:  Per OT note  BLE:  BLE lack full knee extension RLE: WFL- Knee extension lacks ~10*  LLE: WFL, except knee ext lacks ~25*    Strength BUE:  Per OT note  BLE:  Grossly 4-/5     Balance Sitting EOB:  SBA  Dynamic Standing:  modA front [address] Sitting EOB: SBA  Dynamic Standing: Mod<>Max A  Sitting EOB:  Independent  Dynamic Standing:  Jason front [address]     Pt is A & O x 4  Sensation:  Pt denies numbness and tingling to extremities  Edema:  unremarkable    Vitals:  SPo2 monitored throughout session on 2L O2 98% with     Patient education  Pt educated on safety with mobility, transfer technique, stretching B knee. Pt educated on safety in room with utilization of call light for assistance with mobility. Patient response to education:   Pt verbalized understanding Pt demonstrated skill Pt requires further education in this area   yes yes yes     ASSESSMENT:    Comments:  RN cleared pt for activity prior to session. Pt received supine in bed and agreeable to PT intervention with OT collaboration at this time. Pt demonstrated impulsive behaviors and poor safety awareness. Pt required assist to roll to side and reach for bed rail, but was able to complete transfer to EOB with HOB elevated. Pt unable to stand erect due to B hip and knee flexion. Pt abandoned [address] with RUE and attempted to reach for arm rest of chair. Increased assist required due to poor safety and LOB. Pt instructed in pursed lip breathing and reported not being able tolerate sitting in bedside chair. Pt OOB for >25 minutes. Pt transferred back to sitting EOB without WW and performed low sit pivot. Pt positioned in bed with LLE elevated at ankle for static passive stretch of L hamstring. Pt left with HOB elevated and call button within reach. RN notified of pt's performance, tolerance and c/o chest discomfort. Treatment:  Patient practiced and was instructed in the following treatment:     Therapeutic Activities Completed as noted above.  Pt education as noted above. PLAN:    Patient is making progress towards goals. Will continue with current POC.       Time in  1410  Time out  1450    Total Treatment Time  40 minutes     Evaluation Time includes thorough review of current medical information,

## 2020-08-01 ENCOUNTER — APPOINTMENT (OUTPATIENT)
Dept: GENERAL RADIOLOGY | Age: 85
DRG: 291 | End: 2020-08-01
Payer: MEDICARE

## 2020-08-01 LAB
ALBUMIN SERPL-MCNC: 3.3 G/DL (ref 3.5–5.2)
ALP BLD-CCNC: 68 U/L (ref 40–129)
ALT SERPL-CCNC: 11 U/L (ref 0–40)
ANION GAP SERPL CALCULATED.3IONS-SCNC: 12 MMOL/L (ref 7–16)
AST SERPL-CCNC: 15 U/L (ref 0–39)
BASOPHILS ABSOLUTE: 0.02 E9/L (ref 0–0.2)
BASOPHILS RELATIVE PERCENT: 0.3 % (ref 0–2)
BILIRUB SERPL-MCNC: 0.8 MG/DL (ref 0–1.2)
BUN BLDV-MCNC: 42 MG/DL (ref 8–23)
CALCIUM SERPL-MCNC: 9.5 MG/DL (ref 8.6–10.2)
CHLORIDE BLD-SCNC: 99 MMOL/L (ref 98–107)
CO2: 23 MMOL/L (ref 22–29)
CREAT SERPL-MCNC: 1.5 MG/DL (ref 0.7–1.2)
EOSINOPHILS ABSOLUTE: 0.33 E9/L (ref 0.05–0.5)
EOSINOPHILS RELATIVE PERCENT: 5.4 % (ref 0–6)
FERRITIN: 274 NG/ML
GFR AFRICAN AMERICAN: 53
GFR NON-AFRICAN AMERICAN: 44 ML/MIN/1.73
GLUCOSE BLD-MCNC: 100 MG/DL (ref 74–99)
GRAM STAIN ORDERABLE: NORMAL
HCT VFR BLD CALC: 37.2 % (ref 37–54)
HEMATOCRIT FLUID: <2 %
HEMOGLOBIN: 11.8 G/DL (ref 12.5–16.5)
IMMATURE GRANULOCYTES #: 0.03 E9/L
IMMATURE GRANULOCYTES %: 0.5 % (ref 0–5)
LYMPHOCYTES ABSOLUTE: 1.28 E9/L (ref 1.5–4)
LYMPHOCYTES RELATIVE PERCENT: 21.1 % (ref 20–42)
MCH RBC QN AUTO: 27.8 PG (ref 26–35)
MCHC RBC AUTO-ENTMCNC: 31.7 % (ref 32–34.5)
MCV RBC AUTO: 87.5 FL (ref 80–99.9)
MONOCYTES ABSOLUTE: 0.81 E9/L (ref 0.1–0.95)
MONOCYTES RELATIVE PERCENT: 13.3 % (ref 2–12)
NEUTROPHILS ABSOLUTE: 3.61 E9/L (ref 1.8–7.3)
NEUTROPHILS RELATIVE PERCENT: 59.4 % (ref 43–80)
PDW BLD-RTO: 15.3 FL (ref 11.5–15)
PLATELET # BLD: 185 E9/L (ref 130–450)
PMV BLD AUTO: 9.3 FL (ref 7–12)
POTASSIUM REFLEX MAGNESIUM: 4.5 MMOL/L (ref 3.5–5)
PRO-BNP: 3757 PG/ML (ref 0–450)
RBC # BLD: 4.25 E12/L (ref 3.8–5.8)
SODIUM BLD-SCNC: 134 MMOL/L (ref 132–146)
TOTAL PROTEIN: 6.1 G/DL (ref 6.4–8.3)
WBC # BLD: 6.1 E9/L (ref 4.5–11.5)

## 2020-08-01 PROCEDURE — 2700000000 HC OXYGEN THERAPY PER DAY

## 2020-08-01 PROCEDURE — 2060000000 HC ICU INTERMEDIATE R&B

## 2020-08-01 PROCEDURE — 99233 SBSQ HOSP IP/OBS HIGH 50: CPT | Performed by: INTERNAL MEDICINE

## 2020-08-01 PROCEDURE — 6370000000 HC RX 637 (ALT 250 FOR IP): Performed by: INTERNAL MEDICINE

## 2020-08-01 PROCEDURE — 71045 X-RAY EXAM CHEST 1 VIEW: CPT

## 2020-08-01 PROCEDURE — 99231 SBSQ HOSP IP/OBS SF/LOW 25: CPT | Performed by: INTERNAL MEDICINE

## 2020-08-01 PROCEDURE — 80053 COMPREHEN METABOLIC PANEL: CPT

## 2020-08-01 PROCEDURE — 73562 X-RAY EXAM OF KNEE 3: CPT

## 2020-08-01 PROCEDURE — 71046 X-RAY EXAM CHEST 2 VIEWS: CPT

## 2020-08-01 PROCEDURE — 83880 ASSAY OF NATRIURETIC PEPTIDE: CPT

## 2020-08-01 PROCEDURE — 6370000000 HC RX 637 (ALT 250 FOR IP): Performed by: STUDENT IN AN ORGANIZED HEALTH CARE EDUCATION/TRAINING PROGRAM

## 2020-08-01 PROCEDURE — 73502 X-RAY EXAM HIP UNI 2-3 VIEWS: CPT

## 2020-08-01 PROCEDURE — 2580000003 HC RX 258: Performed by: STUDENT IN AN ORGANIZED HEALTH CARE EDUCATION/TRAINING PROGRAM

## 2020-08-01 PROCEDURE — 85025 COMPLETE CBC W/AUTO DIFF WBC: CPT

## 2020-08-01 PROCEDURE — 36415 COLL VENOUS BLD VENIPUNCTURE: CPT

## 2020-08-01 PROCEDURE — 82728 ASSAY OF FERRITIN: CPT

## 2020-08-01 RX ADMIN — SUCRALFATE 1 G: 1 TABLET ORAL at 00:23

## 2020-08-01 RX ADMIN — SUCRALFATE 1 G: 1 TABLET ORAL at 19:16

## 2020-08-01 RX ADMIN — SODIUM CHLORIDE, PRESERVATIVE FREE 10 ML: 5 INJECTION INTRAVENOUS at 08:51

## 2020-08-01 RX ADMIN — Medication 1000 MCG: at 08:51

## 2020-08-01 RX ADMIN — METOPROLOL SUCCINATE 37.5 MG: 25 TABLET, EXTENDED RELEASE ORAL at 10:08

## 2020-08-01 RX ADMIN — SUCRALFATE 1 G: 1 TABLET ORAL at 05:22

## 2020-08-01 RX ADMIN — FOLIC ACID 1 MG: 1 TABLET ORAL at 08:51

## 2020-08-01 RX ADMIN — APIXABAN 2.5 MG: 5 TABLET, FILM COATED ORAL at 08:51

## 2020-08-01 RX ADMIN — SUCRALFATE 1 G: 1 TABLET ORAL at 23:41

## 2020-08-01 RX ADMIN — POTASSIUM CHLORIDE 20 MEQ: 1500 TABLET, EXTENDED RELEASE ORAL at 18:57

## 2020-08-01 RX ADMIN — METOPROLOL SUCCINATE 37.5 MG: 25 TABLET, EXTENDED RELEASE ORAL at 21:40

## 2020-08-01 RX ADMIN — FUROSEMIDE 40 MG: 40 TABLET ORAL at 08:51

## 2020-08-01 RX ADMIN — POTASSIUM CHLORIDE 20 MEQ: 1500 TABLET, EXTENDED RELEASE ORAL at 08:51

## 2020-08-01 RX ADMIN — SODIUM CHLORIDE, PRESERVATIVE FREE 10 ML: 5 INJECTION INTRAVENOUS at 21:45

## 2020-08-01 RX ADMIN — SUCRALFATE 1 G: 1 TABLET ORAL at 11:54

## 2020-08-01 RX ADMIN — ASPIRIN 81 MG: 81 TABLET, COATED ORAL at 08:51

## 2020-08-01 RX ADMIN — APIXABAN 2.5 MG: 5 TABLET, FILM COATED ORAL at 21:44

## 2020-08-01 RX ADMIN — TAMSULOSIN HYDROCHLORIDE 0.4 MG: 0.4 CAPSULE ORAL at 08:51

## 2020-08-01 RX ADMIN — ROSUVASTATIN CALCIUM 5 MG: 5 TABLET, FILM COATED ORAL at 21:44

## 2020-08-01 RX ADMIN — PANTOPRAZOLE SODIUM 40 MG: 40 TABLET, DELAYED RELEASE ORAL at 05:22

## 2020-08-01 ASSESSMENT — PAIN DESCRIPTION - ORIENTATION: ORIENTATION: LEFT

## 2020-08-01 ASSESSMENT — PAIN DESCRIPTION - PROGRESSION
CLINICAL_PROGRESSION: NOT CHANGED

## 2020-08-01 ASSESSMENT — PAIN DESCRIPTION - PAIN TYPE: TYPE: CHRONIC PAIN

## 2020-08-01 ASSESSMENT — PAIN SCALES - GENERAL
PAINLEVEL_OUTOF10: 5
PAINLEVEL_OUTOF10: 0
PAINLEVEL_OUTOF10: 0

## 2020-08-01 ASSESSMENT — PAIN DESCRIPTION - LOCATION: LOCATION: KNEE

## 2020-08-01 NOTE — PROGRESS NOTES
[address]   Internal Medicine Residency / [address]    Attending Physician Statement  I have discussed the case, including pertinent history and exam findings with the resident and the team.  I have seen and examined the patient and the key elements of the encounter have been performed by   me. I agree with the assessment, plan and orders as documented by the resident. HTN,HLD and CAD and AFib  Now  Rate controlled  S/P Thoracentesis   Hx of Lymphoma  Immobile at present and has been for days  LLE poor strength  And diminished ROM left knee with pain and crepitation  Has negative Babinski  Hip mobility with passive ROM intact   Plan: Continue same            X ray left knee   And PT to see       Remainder of medical problems as per resident note.       Roney Palmer  Internal Medicine Residency Faculty

## 2020-08-01 NOTE — PROGRESS NOTES
Pulmonary [facility]                             Pulmonary Consult/Progress Note :              Consulting Physician:Bilateral Pleural effusion       Reason for Consultation:bilateral effusion   CC : sob   HPI:   Fluid exudate   Doing very well   SOB has improved and no fever or chills  Off Eliquis   No chest pain       PHYSICAL EXAMINATION:     VITAL SIGNS:  BP (!) 94/48   Pulse 96   Temp 97.1 °F (36.2 °C) (Temporal)   Resp 18   Ht 6' (1.829 m)   Wt 198 lb 14.4 oz (90.2 kg)   SpO2 98%   BMI 26.98 kg/m²   Wt Readings from Last 3 Encounters:   07/31/20 198 lb 14.4 oz (90.2 kg)   10/31/19 220 lb (99.8 kg)   10/14/19 224 lb (101.6 kg)     Temp Readings from Last 3 Encounters:   07/31/20 97.1 °F (36.2 °C) (Temporal)     TMAX:  BP Readings from Last 3 Encounters:   07/31/20 (!) 94/48   10/31/19 118/60   10/14/19 120/68     Pulse Readings from Last 3 Encounters:   07/31/20 96   10/31/19 78   10/14/19 100           INTAKE/OUTPUTS:  I/O last 3 completed shifts: In: 245 [P.O.:240;  I.V.:5]  Out: 250 [Urine:250]    Intake/Output Summary (Last 24 hours) at 7/31/2020 2214  Last data filed at 7/31/2020 1125  Gross per 24 hour   Intake 245 ml   Output 250 ml   Net -5 ml       General Appearance: alert and oriented to person, place and time, well-developed and   well-nourished, in no acute distress   Eyes: pupils equal, round, and reactive to light, extraocular eye movements intact, conjunctivae normal and sclera anicteric   Neck: neck supple and non tender without mass, no thyromegaly, no thyroid nodules and no cervical adenopathy   Pulmonary/Chest:decrease braeth sound bilateral    Cardiovascular: normal rate, regular rhythm, normal S1 and S2, no murmurs, rubs, clicks or gallops, distal pulses intact, no carotid bruits, no murmurs, no gallops, no carotid bruits and no JVD   Abdomen: obese, soft, non-tender, non-distended, normal bowel sounds, no masses or organomegaly Congestive heart failure (HCC)    Acute diastolic heart failure with preserved ejection fraction (HCC)    Bilateral pleural effusion    Stage 3 chronic kidney disease (HCC)    Hemoptysis               ASSESSMENT:  1.) Bilateral  pleural effusion Right more than left   2.)Medistinal adenopathy   3. )CHF   4.)Possible COPD   5. )VESTA/      PLAN:  Fluid exudate ,could be related to diuresis ,still will follow closely nad may consider EBUS as OP   *-S/P Thoracentesis ,.developed chest pain ,could not finish his an drain all fluid I am concern he already developed entrapped lung ,  Pending cytology/flow as fluid exudate   Need aggressive diuresis   *-will send for flow cytometry  *-diuresis per primary  *-BD   *_NIMV if in any distress          Dmitry Murguia MD,[facility]  Pulmonary&Critical Care Medicine   Director of [address] [place] [address] Director of [address] [place] [address]    Arash Sneed    NOTE: This report was transcribed using voice recognition software. Every effort was made to ensure accuracy; however, inadvertent computerized transcription errors may be present.

## 2020-08-01 NOTE — PLAN OF CARE
Problem: Activity:  Goal: Capacity to carry out activities will improve  Description: Capacity to carry out activities will improve  Outcome: Met This Shift     Problem:  Activity:  Goal: Will verbalize the importance of balancing activity with adequate rest periods  Description: Will verbalize the importance of balancing activity with adequate rest periods  Outcome: Met This Shift     Problem: Cardiac:  Goal: Hemodynamic stability will improve  Description: Hemodynamic stability will improve  Outcome: Met This Shift     Problem: Cardiac:  Goal: Ability to maintain an adequate cardiac output will improve  Description: Ability to maintain an adequate cardiac output will improve  Outcome: Met This Shift     Problem: Pain:  Goal: Pain level will decrease  Description: Pain level will decrease  Outcome: Met This Shift     Problem: Skin Integrity:  Goal: Will show no infection signs and symptoms  Description: Will show no infection signs and symptoms  Outcome: Met This Shift     Problem: Skin Integrity:  Goal: Absence of new skin breakdown  Description: Absence of new skin breakdown  Outcome: Met This Shift

## 2020-08-01 NOTE — PROGRESS NOTES
1135    aspirin EC tablet 81 mg, 81 mg, Oral, Daily, Harinder Salazar MD, 81 mg at 07/31/20 1105    pantoprazole (PROTONIX) tablet 40 mg, 40 mg, Oral, QAM AC, Harinder Salazar MD, 40 mg at 18/74/03 4628    folic acid (FOLVITE) tablet 1 mg, 1 mg, Oral, Daily, Harinder Salazar MD, 1 mg at 07/31/20 0841    rosuvastatin (CRESTOR) tablet 5 mg, 5 mg, Oral, Nightly, Harinder Salazar MD, 5 mg at 07/31/20 2052    tamsulosin (FLOMAX) capsule 0.4 mg, 0.4 mg, Oral, Daily, Harinder Salazar MD, 0.4 mg at 07/31/20 0841    vitamin B-12 (CYANOCOBALAMIN) tablet 1,000 mcg, 1,000 mcg, Oral, Daily, Harinder Salazar MD, 1,000 mcg at 07/31/20 0841    sodium chloride flush 0.9 % injection 10 mL, 10 mL, Intravenous, 2 times per day, Harinder Salazar MD, 10 mL at 07/31/20 2052    acetaminophen (TYLENOL) tablet 650 mg, 650 mg, Oral, Q6H PRN **OR** acetaminophen (TYLENOL) suppository 650 mg, 650 mg, Rectal, Q6H PRN, Harinder Salazar MD    polyethylene glycol (GLYCOLAX) packet 17 g, 17 g, Oral, Daily PRN, Harinder Salazar MD    promethazine (PHENERGAN) tablet 12.5 mg, 12.5 mg, Oral, Q6H PRN **OR** ondansetron (ZOFRAN) injection 4 mg, 4 mg, Intravenous, Q6H PRN, Harinder Salazar MD    Physical Exam:  /76   Pulse 99   Temp 97.6 °F (36.4 °C) (Temporal)   Resp 20   Ht 6' (1.829 m)   Wt 202 lb (91.6 kg)   SpO2 100%   BMI 27.40 kg/m²   Wt Readings from Last 3 Encounters:   08/01/20 202 lb (91.6 kg)   10/31/19 220 lb (99.8 kg)   10/14/19 224 lb (101.6 kg)     Appearance: Elderly gentleman, awake, alert, no acute respiratory distress  Skin: Intact, no rash  Head: Normocephalic, atraumatic  Eyes: EOMI, no conjunctival erythema  ENMT: No pharyngeal erythema, MMM, no rhinorrhea  Neck: Supple, no elevated JVP, no carotid bruits  Lungs: Trace bibasilar rales.   Cardiac: PMI nondisplaced, irregular rhythm with a normal rate, S1 & S2 normal, no murmurs  Abdomen: Soft, nontender, +bowel sounds  Extremities: Moves all extremities x 4, no lower extremity edema  Neurologic: No focal motor deficits apparent, normal mood and affect  Peripheral Pulses: Intact posterior tibial pulses bilaterally    Intake/Output:    Intake/Output Summary (Last 24 hours) at 8/1/2020 0659  Last data filed at 8/1/2020 0526  Gross per 24 hour   Intake 5 ml   Output 905 ml   Net -900 ml     I/O this shift:  In: -   Out: 905 [Urine:905]    Laboratory Tests:  Recent Labs     07/30/20 0451 07/31/20  0608 08/01/20  0511    136 134   K 4.3 4.8 4.5   CL 98 97* 99   CO2 25 27 23   BUN 28* 38* 42*   CREATININE 1.4* 1.5* 1.5*   GLUCOSE 89 98 100*   CALCIUM 10.0 9.7 9.5     No results found for: MG  Recent Labs     07/30/20 0451 07/31/20  0608 08/01/20  0511   ALKPHOS 72 72 68   ALT 13 12 11   AST 19 16 15   PROT 6.5 6.4 6.1*   BILITOT 1.1 0.9 0.8   LABALBU 3.6 3.4* 3.3*     Recent Labs     07/30/20 0451 07/31/20  0608 08/01/20  0511   WBC 4.9 6.1 6.1   RBC 4.53 4.55 4.25   HGB 12.7 12.6 11.8*   HCT 39.2 39.3 37.2   MCV 86.5 86.4 87.5   MCH 28.0 27.7 27.8   MCHC 32.4 32.1 31.7*   RDW 15.5* 15.4* 15.3*    197 185   MPV 9.4 8.6 9.3     Lab Results   Component Value Date    CKMB 2.1 07/28/2020    TROPONINI 0.01 07/28/2020    TROPONINI 0.03 07/28/2020    TROPONINI 0.02 07/27/2020     Lab Results   Component Value Date    INR 1.2 07/27/2020    PROTIME 14.0 (H) 07/27/2020     Lab Results   Component Value Date    TSH 1.880 07/27/2020       Cardiac Tests:    EKG: Atrial fibrillation 114 bpm.  Normal axis normal intervals. Low voltage. Nonspecific ST changes. Telemetry: Atrial fibrillation the 90s to 100s    Chest X-ray:   7/30/2020 postthoracentesis  FINDINGS:      There has been mild decrease in the right pleural effusion since the    prior study. Pleural thickening/fluid left costophrenic angle is also    suspected. Heart size is borderline enlarged.  There are no acute    infiltrates.              Impression    Mild decrease in right-sided pleural effusion since the prior study           Echocardiogram:   Transthoracic echo 7/28/2020   Summary   Normal left ventricle size and systolic function. Ejection fraction is visually estimated at 60-65%. Atrial fibrillation may   affect the evaluation of LV systolic function. No regional wall motion abnormalities seen. Normal left ventricle wall thickness. Indeterminate diastolic function. The left atrium is moderately dilated. Normal right ventricular size and function. TAPSE 196 mm. Physiologic and/or trace mitral regurgitation is present. There is mild aortic stenosis with valve area of 1.7 sq cm. Physiologic and/or trace tricuspid regurgitation. RVSP is 31 mmHg. Normal estimated PA systolic pressure. Mildly dilated aortic root (4.0 cm) and normal sized ascending aorta. No evidence for hemodynamically significant pericardial effusion. No previous echo for comparison. Stress test:    Pharmacologic nuclear stress test-June 2018 ([facility]): LVEF 52%, there is resting perfusion abnormality in the inferior septal wall consistent with a prior infarction without any reversible perfusion defect.  Inferior wall hypokinesis. Cardiac catheterization: None available    ----------------------------------------------------------------------------------------------------------------------------------------------------------------  IMPRESSION:  1. Acute on chronic HFpEF. Net -5 L  2. Bilateral pleural effusions, status post right thoracentesis 7/30/2020, 900 mL serosanguineous fluid, exudative  3. Permanent atrial fibrillation. Rate controlled, AC with apixaban dose adjusted  4. CAD, status post PCI to mid LAD 2009. Inferior scar on recent stress at [employer] 2018  5. Hemoptysis  6. Melena, ? fobt  7. Mild anemia, stable  8. UTI  9. CKD, creatinine stable 1.4-1.5  10.  Comorbid disease: Hypertension, hyperlipidemia, history of non-Hodgkin's lymphoma, history of TIA, GERD, BPH, hypoalbuminemia    RECOMMENDATIONS:  Appears relatively euvolemic, still short of breath. A. fib rates reasonably well controlled.  Repeat 2 view chest x-ray   Repeat proBNP   Continue oral furosemide   Check FOBT if not done   Continue current cardiac medications including dose adjusted apixaban, aspirin 81, metoprolol succinate 37.5 mg twice daily, rosuvastatin   Risk factor modification    Miriam White MD  [facility]) Cardiology    NOTE: This report was transcribed using voice recognition software. Every effort was made to ensure accuracy; however, inadvertent computerized transcription errors may be present.

## 2020-08-02 LAB
ALBUMIN SERPL-MCNC: 3.3 G/DL (ref 3.5–5.2)
ALP BLD-CCNC: 70 U/L (ref 40–129)
ALT SERPL-CCNC: 11 U/L (ref 0–40)
ANION GAP SERPL CALCULATED.3IONS-SCNC: 10 MMOL/L (ref 7–16)
AST SERPL-CCNC: 15 U/L (ref 0–39)
BASOPHILS ABSOLUTE: 0.02 E9/L (ref 0–0.2)
BASOPHILS RELATIVE PERCENT: 0.3 % (ref 0–2)
BILIRUB SERPL-MCNC: 0.9 MG/DL (ref 0–1.2)
BODY FLUID CULTURE, STERILE: NORMAL
BUN BLDV-MCNC: 40 MG/DL (ref 8–23)
C-REACTIVE PROTEIN: 8 MG/DL (ref 0–0.4)
CALCIUM SERPL-MCNC: 10 MG/DL (ref 8.6–10.2)
CHLORIDE BLD-SCNC: 99 MMOL/L (ref 98–107)
CO2: 27 MMOL/L (ref 22–29)
CREAT SERPL-MCNC: 1.6 MG/DL (ref 0.7–1.2)
EOSINOPHILS ABSOLUTE: 0.35 E9/L (ref 0.05–0.5)
EOSINOPHILS RELATIVE PERCENT: 6 % (ref 0–6)
GFR AFRICAN AMERICAN: 49
GFR NON-AFRICAN AMERICAN: 41 ML/MIN/1.73
GLUCOSE BLD-MCNC: 93 MG/DL (ref 74–99)
GRAM STAIN RESULT: NORMAL
HCT VFR BLD CALC: 35.7 % (ref 37–54)
HEMOGLOBIN: 11.4 G/DL (ref 12.5–16.5)
IMMATURE GRANULOCYTES #: 0.03 E9/L
IMMATURE GRANULOCYTES %: 0.5 % (ref 0–5)
LYMPHOCYTES ABSOLUTE: 1.38 E9/L (ref 1.5–4)
LYMPHOCYTES RELATIVE PERCENT: 23.5 % (ref 20–42)
MCH RBC QN AUTO: 27.7 PG (ref 26–35)
MCHC RBC AUTO-ENTMCNC: 31.9 % (ref 32–34.5)
MCV RBC AUTO: 86.7 FL (ref 80–99.9)
MONOCYTES ABSOLUTE: 0.84 E9/L (ref 0.1–0.95)
MONOCYTES RELATIVE PERCENT: 14.3 % (ref 2–12)
NEUTROPHILS ABSOLUTE: 3.24 E9/L (ref 1.8–7.3)
NEUTROPHILS RELATIVE PERCENT: 55.4 % (ref 43–80)
PDW BLD-RTO: 14.8 FL (ref 11.5–15)
PLATELET # BLD: 198 E9/L (ref 130–450)
PMV BLD AUTO: 9 FL (ref 7–12)
POTASSIUM REFLEX MAGNESIUM: 4.7 MMOL/L (ref 3.5–5)
PROCALCITONIN: 0.1 NG/ML (ref 0–0.08)
RBC # BLD: 4.12 E12/L (ref 3.8–5.8)
SODIUM BLD-SCNC: 136 MMOL/L (ref 132–146)
TOTAL PROTEIN: 6.2 G/DL (ref 6.4–8.3)
WBC # BLD: 5.9 E9/L (ref 4.5–11.5)

## 2020-08-02 PROCEDURE — 2060000000 HC ICU INTERMEDIATE R&B

## 2020-08-02 PROCEDURE — 2700000000 HC OXYGEN THERAPY PER DAY

## 2020-08-02 PROCEDURE — 99233 SBSQ HOSP IP/OBS HIGH 50: CPT | Performed by: INTERNAL MEDICINE

## 2020-08-02 PROCEDURE — 6370000000 HC RX 637 (ALT 250 FOR IP): Performed by: INTERNAL MEDICINE

## 2020-08-02 PROCEDURE — 36415 COLL VENOUS BLD VENIPUNCTURE: CPT

## 2020-08-02 PROCEDURE — 84145 PROCALCITONIN (PCT): CPT

## 2020-08-02 PROCEDURE — 97535 SELF CARE MNGMENT TRAINING: CPT

## 2020-08-02 PROCEDURE — 97530 THERAPEUTIC ACTIVITIES: CPT

## 2020-08-02 PROCEDURE — 2580000003 HC RX 258: Performed by: STUDENT IN AN ORGANIZED HEALTH CARE EDUCATION/TRAINING PROGRAM

## 2020-08-02 PROCEDURE — 86140 C-REACTIVE PROTEIN: CPT

## 2020-08-02 PROCEDURE — 99232 SBSQ HOSP IP/OBS MODERATE 35: CPT | Performed by: INTERNAL MEDICINE

## 2020-08-02 PROCEDURE — 85025 COMPLETE CBC W/AUTO DIFF WBC: CPT

## 2020-08-02 PROCEDURE — 6370000000 HC RX 637 (ALT 250 FOR IP): Performed by: STUDENT IN AN ORGANIZED HEALTH CARE EDUCATION/TRAINING PROGRAM

## 2020-08-02 PROCEDURE — 80053 COMPREHEN METABOLIC PANEL: CPT

## 2020-08-02 PROCEDURE — 99231 SBSQ HOSP IP/OBS SF/LOW 25: CPT | Performed by: INTERNAL MEDICINE

## 2020-08-02 RX ORDER — FUROSEMIDE 40 MG/1
40 TABLET ORAL 2 TIMES DAILY
Status: DISCONTINUED | OUTPATIENT
Start: 2020-08-02 | End: 2020-08-05 | Stop reason: HOSPADM

## 2020-08-02 RX ADMIN — PANTOPRAZOLE SODIUM 40 MG: 40 TABLET, DELAYED RELEASE ORAL at 05:36

## 2020-08-02 RX ADMIN — POTASSIUM CHLORIDE 20 MEQ: 1500 TABLET, EXTENDED RELEASE ORAL at 08:41

## 2020-08-02 RX ADMIN — ASPIRIN 81 MG: 81 TABLET, COATED ORAL at 08:44

## 2020-08-02 RX ADMIN — SODIUM CHLORIDE, PRESERVATIVE FREE 10 ML: 5 INJECTION INTRAVENOUS at 08:42

## 2020-08-02 RX ADMIN — POTASSIUM CHLORIDE 20 MEQ: 1500 TABLET, EXTENDED RELEASE ORAL at 17:54

## 2020-08-02 RX ADMIN — METOPROLOL SUCCINATE 37.5 MG: 25 TABLET, EXTENDED RELEASE ORAL at 08:42

## 2020-08-02 RX ADMIN — Medication 1000 MCG: at 08:42

## 2020-08-02 RX ADMIN — ROSUVASTATIN CALCIUM 5 MG: 5 TABLET, FILM COATED ORAL at 20:40

## 2020-08-02 RX ADMIN — SODIUM CHLORIDE, PRESERVATIVE FREE 10 ML: 5 INJECTION INTRAVENOUS at 20:42

## 2020-08-02 RX ADMIN — DICLOFENAC SODIUM 4 G: 10 GEL TOPICAL at 13:55

## 2020-08-02 RX ADMIN — DICLOFENAC SODIUM 4 G: 10 GEL TOPICAL at 20:42

## 2020-08-02 RX ADMIN — SUCRALFATE 1 G: 1 TABLET ORAL at 12:02

## 2020-08-02 RX ADMIN — APIXABAN 2.5 MG: 5 TABLET, FILM COATED ORAL at 20:42

## 2020-08-02 RX ADMIN — TAMSULOSIN HYDROCHLORIDE 0.4 MG: 0.4 CAPSULE ORAL at 08:42

## 2020-08-02 RX ADMIN — SUCRALFATE 1 G: 1 TABLET ORAL at 05:36

## 2020-08-02 RX ADMIN — FUROSEMIDE 40 MG: 40 TABLET ORAL at 08:42

## 2020-08-02 RX ADMIN — SUCRALFATE 1 G: 1 TABLET ORAL at 18:01

## 2020-08-02 RX ADMIN — FOLIC ACID 1 MG: 1 TABLET ORAL at 08:42

## 2020-08-02 RX ADMIN — FUROSEMIDE 40 MG: 40 TABLET ORAL at 17:54

## 2020-08-02 RX ADMIN — APIXABAN 2.5 MG: 5 TABLET, FILM COATED ORAL at 08:42

## 2020-08-02 RX ADMIN — METOPROLOL SUCCINATE 37.5 MG: 25 TABLET, EXTENDED RELEASE ORAL at 20:40

## 2020-08-02 ASSESSMENT — PAIN SCALES - GENERAL
PAINLEVEL_OUTOF10: 0

## 2020-08-02 ASSESSMENT — PAIN DESCRIPTION - PROGRESSION: CLINICAL_PROGRESSION: NOT CHANGED

## 2020-08-02 NOTE — PROGRESS NOTES
[facility]  Internal Medicine Residency Program  Progress Note - House Team 2    Patient:  Sondra Masterson 80 y.o. male MRN: [id number]     Date of Service: 8/2/2020     CC: SOB and bilateral LE edema  Overnight events: None  Hospital Day: 7    Subjective     Patient feels well this morning and was on 3 L NC. He was sitting comfortably in the chair and says he has been able to get up and move some. Complains of left knee pain. Objective     Physical Exam:  Vitals: /68   Pulse 82   Temp 98 °F (36.7 °C) (Temporal)   Resp 14   Ht 6' (1.829 m)   Wt 195 lb (88.5 kg)   SpO2 95%   BMI 26.45 kg/m²     I & O - 24hr:     Intake/Output Summary (Last 24 hours) at 8/2/2020 1410  Last data filed at 8/2/2020 1210  Gross per 24 hour   Intake 600 ml   Output 1350 ml   Net -750 ml       · General Appearance: alert, appears stated age, cooperative and no distress  · HEENT:  Head: Normocephalic, no lesions, without obvious abnormality. · Neck: no adenopathy, no carotid bruit, no JVD, supple, symmetrical, trachea midline and thyroid not enlarged, symmetric, no tenderness/mass/nodules  · Lung: diminished breath sounds bibasilar and right middle. No wheezes, rales, or rhonchi noted. · Heart: regular rate and rhythm, S1, S2 normal, no murmur, click, rub or gallop  · Abdomen: soft, non-tender; bowel sounds normal; no masses,  no organomegaly  · Extremities:  No cyanosis or edema. · Musculokeletal: No joint swelling. Has decreased range of motion in the left hip and knee with pain on movement. Not calf tenderness. No erythema of the lower extremities. Normal range of motion in the left lower extremity. · Neurologic: Mental status: Alert, oriented, thought content appropriate.     Pertinent Labs & Imaging Studies     CBC:   Lab Results   Component Value Date    WBC 5.9 08/02/2020    RBC 4.12 08/02/2020    HGB 11.4 08/02/2020    HCT 35.7 08/02/2020    MCV 86.7 08/02/2020    MCH 27.7 08/02/2020    MCHC 31.9 evidence of PE. Troponin negative, pro-BNP elevated at 4687. Echo showed normal left ventricle size and systolic function. EF 60-65%. The left atrium is moderately dilated. US duplex of the bilateral lower extremities showed no evidence of DVT. He underwent right thoracentesis 7/30 with removal of 900 mL of serosanguinous fluid. CXR showed slight improvement in the right pleural effusion. 1. Acute CHF exacerbation with bilateral pleural effusions  Improved. Unlikely to be related to acute ischemia: troponin negative x3 and EKG with no ST changes. Has improvement of his SOB and resolution of LE edema. S/P right thoracentesis on 7/30. No further thoracentesis planned.  Increased Lasix 40 mg PO to BID    2. Atrial fibrillation with RVR  Rate is controlled today, low 90's.  Continue Eliquis 2.5 mg BID   Toprol XL 37.5 mg BID    3. CKD stage 3  Creatinine 1.5, stable since admission.  Follow BMP    4. Normocytic anemia  Hgb normal last few days. 12.6 today. Patient reports dark and tarry stools. Per GI, unlikely to have been having melena as his hct has been stable.  Plan for EGD in the near future    5. GERD  Has substernal burning. Improved with Carafate. · Continue Carafate    6. Left knee pain and decreased ROM  Bilateral hips and left knee XR with osteoarthritis and osteopenia. · Will have PT work with patient  · Diclofenac gel to left knee QID PRN    7. UTI  Hx of BPH. UA was positive for WBC, nitrites, and bacteria. Asymptomatic. Was on Ceftriaxone, discontinued. · Flomax 0.4 mg daily    8. HLD  · Rosuvastatin 5 mg QHS    9.  Hx of lymphoma    PT/OT evaluation: PT [facility] 13/24, OT [facility] 15/24  DVT prophylaxis/GI prophylaxis: Eliquis/Protonix  Disposition: Continue current care    Antwan Krishnan MD,  PGY-1  Attending Physician: Dr. Per Ramon

## 2020-08-02 NOTE — PLAN OF CARE
Problem:  Activity:  Goal: Capacity to carry out activities will improve  Outcome: Met This Shift  Goal: Will verbalize the importance of balancing activity with adequate rest periods  Outcome: Met This Shift     Problem: Cardiac:  Goal: Hemodynamic stability will improve  Outcome: Met This Shift  Goal: Ability to maintain an adequate cardiac output will improve  Outcome: Met This Shift     Problem: Fluid Volume:  Goal: Risk for excess fluid volume will decrease  Outcome: Met This Shift  Goal: Maintenance of adequate hydration will improve  Outcome: Met This Shift     Problem: Skin Integrity:  Goal: Will show no infection signs and symptoms  Outcome: Met This Shift  Goal: Absence of new skin breakdown  Outcome: Met This Shift

## 2020-08-02 NOTE — PROGRESS NOTES
x 4  Sensation:  Pt denies numbness and tingling to extremities  Edema:  unremarkable    Vitals:  BP measured after transfer to bedside chair d/t pt reporting light headedness. Seated post transfer: 96/55, improving to 120/63 with rest.    Patient education  Pt educated on role of PT intervention. Pt educated on safety in room with utilization of call light for assistance with mobility. Patient response to education:   Pt verbalized understanding Pt demonstrated skill Pt requires further education in this area   yes yes yes     ASSESSMENT:    Comments:  RN cleared pt for activity prior to session. Pt received supine in bed and agreeable to PT intervention with OT collaboration at this time. Pt performed all functional mobility as noted above. Pt demonstrated improved quality of transfer to bedside chair as he was able to achieve full standing position. BP was low upon sitting in chair but this improved with time. Chair alarm was placed on chair and pt was left sitting in bedside chair with all needs met and call light in reach. Pt would benefit from continued skilled PT intervention to address functional mobility deficits noted above. Treatment:  Patient practiced and was instructed in the following treatment:     Therapeutic Activities Completed:  o Functional mobility as noted above:   - Bed mobility: SBA to complete. Additional time required. Cueing provided for proper use of bed rail.     - Transfer training: maxA to achieve full standing position, mod A for stand pivot transfer with front [employer].  Cues for proper hand placement and sequencing  o Skilled repositioning in seated position for comfort.  o Pt education as noted above.  o Skilled vitals monitoring as noted above. PLAN:    Patient is making fair progress towards established goals. Will continue with current POC.       Time in  1305  Time out  1330    Total Treatment Time  25 minutes     CPT codes:  [] Gait training 62697 0 minutes  []

## 2020-08-02 NOTE — PROGRESS NOTES
OT BEDSIDE TREATMENT NOTE      Date:2020  Patient Name: Jo Bowman  MRN: [id number]  : 1931  Room: [facility]-A     Per OT Eval:    Referring Provider: Hortensia Geiger DO      Evaluating OT: John El OTR/L #[id number]     AM-PAC Daily Activity Raw Score:      Recommended Adaptive Equipment:  shower chair, bedside commode       Diagnosis: CHF       Pertinent Medical History: HTN, aFib, HTN,   Past Medical History        Past Medical History:   Diagnosis Date    Anemia      [facility]      Hyperlipidemia      Hypertension      Lymphoma ([facility]      Renal failure, acute ([facility]      Temporal arteritis ([facility]           Precautions:  Falls, bed alarm,      Home Living: questionable historian, Pt lives alone in an apt with 0 step(s) to enter and 0 rail(s); bed/bath on 1st   Bathroom setup: ?? Equipment owned: walker  Prior Level of Function: questionable historian ndependent  with ADLs , Independent  with IADLs; using walker for ambulation.    Driving: no Son and daughter in law drive him and get groceriens     Pain Level: Pt reports mild chest pain and knee pain    Cognition: A&O: x 3,   Follows 1 step directions,               Memory:  fair              Sequencing:  fair               Problem solving:  fair              Judgement/safety:  fair                Functional Assessment:    Initial Eval Status  Date: 20 Treatment Status  Date:  20 STG/LTG  Frequency/duration  2-3x/week, 5-7 days   Feeding Stand by Assist   Set up Independent    Grooming Minimal Assist  Set up  For simple grooming task seated Modified [place]    UB Dressing Moderate Assist   Min A  Seated  Stand by Assist    LB Dressing Maximal Assist   SBA  To don/doff socks seated in bedside chair Stand by Assist    Bathing Maximal Assist  Mod A  simulated  Stand by Assist    Toileting Moderate Assist   Mod A  Per last tx  Recommend bedside commode Stand by Assist    Bed Mobility  Supine to sit: Minimal Assist   Sit to supine: Minimal Assist  SBA- supine to sit  Educated pt on technique to increase independence. Supine to sit: Modified [place]   Sit to supine: Modified [place]    Functional Transfers Sit to stand: Moderate Assist   Stand to sit: Moderate Assist   Stand pivot: Moderate Assist   Max A   Sit < > stand  Cuing for hand placement and body mechanics Stand by Assist    Functional Mobility  (Ambulation) Mod A with ww  3-4 side steps Mod A- stand pivot transfer  Using w/w SBA with [employer]  Functional distance   Balance Sitting:     Static:  wfl    Dynamic:SBA  Standing: mod A with ww- retro lean Sitting:     Static:  wfl    Dynamic:SBA  Standing: Mod A with ww     Activity Tolerance poor  Fair  Pt reported dizziness initially seated EOB BP 97/55 (nursing reported it has been running low) increased to 120/63 following stand pivot Good-   Visual/  Perceptual Glasses: ?             BUE  ROM/Strength/  Fine motor Coordination RUE: ROM WFL     Strength: grossly 3+/5      Strength: fair     Coordination: fair     LUE: ROM  WFL     Strength: grossly 3+/5      Strength: fair     Coordination: fair   Sufficient UE strength for improved indep with functional transfers and mobilty       Comments: Upon arrival pt was in bed & agreeable for therapy. Pt educated on techniques to increase independence and safety during ADL's, bed mobility, and functional transfers. At end of session pt left seated in bedside chair, nursing notified, all lines and tubes intact & call light within reach. Bed alarm set     · Pt has made slow progress towards set goals.    · Continue with current plan of care    Treatment Time In: 1:05          Treatment Time Out: 1:30            Treatment Charges: Mins Units   Ther Ex  20238     Manual Therapy 45519     Thera Activities 75544 15 1   ADL/Home Mgt 81689 10 1   Neuro Re-ed 84079     Group Therapy      Orthotic manage/training  16685     Non-Billable Time     Total Timed Treatment [address] [id number]

## 2020-08-02 NOTE — PROGRESS NOTES
INPATIENT CARDIOLOGY FOLLOW-UP    Name: Clarke Xie    Age: 80 y.o. Date of Admission: 7/27/2020  8:13 AM    Date of Service: 8/2/2020    Primary Cardiologist: Known to Dr. Anita Echevarria from this admission    Chief Complaint: Follow-up for CHF, atrial fibrillation, CAD    Interim History:  Underwent thoracentesis 7/30/2020  States still short of breath. Reports mild vague chest discomfort present worse with deep breathing  Net -5.5L  A. fib rate controlled on telemetry    Review of Systems:   Negative except as described above    Problem List:  Patient Active Problem List   Diagnosis    Atrial fibrillation (HCC)    Anemia    Lymphoma ([facility] [address]    Hyperlipidemia    Hypertension    CAD (coronary artery disease)    Presence of drug coated stent in LAD coronary artery    Obesity (BMI 30.0-34. 9)    Congestive heart failure (HCC)    Acute diastolic heart failure with preserved ejection fraction (HCC)    Bilateral pleural effusion    Stage 3 chronic kidney disease (HCC)    Hemoptysis       Current Medications:    Current Facility-Administered Medications:     furosemide (LASIX) tablet 40 mg, 40 mg, Oral, Daily, João Mobley MD, 40 mg at 08/01/20 0851    apixaban (ELIQUIS) tablet 2.5 mg, 2.5 mg, Oral, BID, João Mobley MD, 2.5 mg at 08/01/20 2144    sucralfate (CARAFATE) tablet 1 g, 1 g, Oral, 4 times per day, Ishmael Dennis MD, 1 g at 08/02/20 0536    metoprolol succinate (TOPROL XL) extended release tablet 37.5 mg, 37.5 mg, Oral, BID, João Mobley MD, 37.5 mg at 08/01/20 2140    aluminum & magnesium hydroxide-simethicone (MAALOX) 30 mL, lidocaine viscous hcl (XYLOCAINE) 5 mL (GI COCKTAIL), , Oral, Once, Ayanna Avalos MD    perflutren lipid microspheres (DEFINITY) injection 1.65 mg, 1.5 mL, Intravenous, ONCE PRN, João Mobley MD    potassium chloride (KLOR-CON M) extended release tablet 20 mEq, 20 mEq, Oral, BID WC, João Mobley MD, 20 mEq at 08/01/20 1857    sodium chloride flush 0.9 % injection 10 mL, 10 mL, Intravenous, PRN, Kristen Pinto II, MD, 10 mL at 07/28/20 1135    aspirin EC tablet 81 mg, 81 mg, Oral, Daily, Ramsey Langston MD, 81 mg at 08/01/20 0851    pantoprazole (PROTONIX) tablet 40 mg, 40 mg, Oral, QAM AC, Ramsey Langston MD, 40 mg at 83/71/51 5531    folic acid (FOLVITE) tablet 1 mg, 1 mg, Oral, Daily, Ramsey Langston MD, 1 mg at 08/01/20 0851    rosuvastatin (CRESTOR) tablet 5 mg, 5 mg, Oral, Nightly, Ramsey Langston MD, 5 mg at 08/01/20 2144    tamsulosin (FLOMAX) capsule 0.4 mg, 0.4 mg, Oral, Daily, Ramsey Langston MD, 0.4 mg at 08/01/20 6566    vitamin B-12 (CYANOCOBALAMIN) tablet 1,000 mcg, 1,000 mcg, Oral, Daily, Ramsey Langston MD, 1,000 mcg at 08/01/20 0851    sodium chloride flush 0.9 % injection 10 mL, 10 mL, Intravenous, 2 times per day, Ramsey Langston MD, 10 mL at 08/01/20 2145    acetaminophen (TYLENOL) tablet 650 mg, 650 mg, Oral, Q6H PRN **OR** acetaminophen (TYLENOL) suppository 650 mg, 650 mg, Rectal, Q6H PRN, Ramsey Langston MD    polyethylene glycol (GLYCOLAX) packet 17 g, 17 g, Oral, Daily PRN, Ramsey Langtson MD    promethazine (PHENERGAN) tablet 12.5 mg, 12.5 mg, Oral, Q6H PRN **OR** ondansetron (ZOFRAN) injection 4 mg, 4 mg, Intravenous, Q6H PRN, Ramsey Langston MD    Physical Exam:  BP (!) 99/58   Pulse 98   Temp 98.1 °F (36.7 °C) (Oral)   Resp 16   Ht 6' (1.829 m)   Wt 195 lb (88.5 kg)   SpO2 97%   BMI 26.45 kg/m²   Wt Readings from Last 3 Encounters:   08/02/20 195 lb (88.5 kg)   10/31/19 220 lb (99.8 kg)   10/14/19 224 lb (101.6 kg)     Appearance: Elderly gentleman, awake, alert, no acute respiratory distress  Skin: Intact, no rash  Head: Normocephalic, atraumatic  Eyes: EOMI, no conjunctival erythema  ENMT: No pharyngeal erythema, MMM, no rhinorrhea  Neck: Supple, no elevated JVP, no carotid bruits  Lungs: Diminished bases, trace bibasilar rales.   Cardiac: PMI nondisplaced, irregular rhythm with a normal rate, S1 & S2 8/1/2020, personally reviewed    The heart is enlarged. The lung fields demonstrate evidence for airspace disease. Infiltrate   is seen at both lung bases as well as to lesser extent a nodular   infiltrate in the left upper lung   The aorta is tortuous and ectatic.        Impression:         Tortuous ectatic aorta   Cardiomegaly   Airspace disease compatible with atelectasis or pneumonia, at the lung   bases superimposed on moderate bilateral pleural effusions   The chest does not appear to be significantly changed in the interval        Echocardiogram:   Transthoracic echo 7/28/2020   Summary   Normal left ventricle size and systolic function. Ejection fraction is visually estimated at 60-65%. Atrial fibrillation may   affect the evaluation of LV systolic function. No regional wall motion abnormalities seen. Normal left ventricle wall thickness. Indeterminate diastolic function. The left atrium is moderately dilated. Normal right ventricular size and function. TAPSE 196 mm. Physiologic and/or trace mitral regurgitation is present. There is mild aortic stenosis with valve area of 1.7 sq cm. Physiologic and/or trace tricuspid regurgitation. RVSP is 31 mmHg. Normal estimated PA systolic pressure. Mildly dilated aortic root (4.0 cm) and normal sized ascending aorta. No evidence for hemodynamically significant pericardial effusion. No previous echo for comparison. Stress test:    Pharmacologic nuclear stress test-June 2018 ([facility]): LVEF 52%, there is resting perfusion abnormality in the inferior septal wall consistent with a prior infarction without any reversible perfusion defect.  Inferior wall hypokinesis. Cardiac catheterization: None available    ----------------------------------------------------------------------------------------------------------------------------------------------------------------  IMPRESSION:  1. Acute on chronic HFpEF. Improved, net -5.5 L  2.  Bilateral pleural effusions, status post right thoracentesis 7/30/2020, 900 mL serosanguineous fluid, exudative  3. Permanent atrial fibrillation. Rate controlled, AC with apixaban dose adjusted  4. CAD, status post PCI to mid LAD 2009. Inferior scar on recent stress at [facility] [place] 2018  5. Hemoptysis  6. Melena, ? fobt  7. Mild anemia, stable Hgb 11.8  8. UTI  9. CKD, creatinine stable 1.4-1.5  10. Comorbid disease: Hypertension, hyperlipidemia, history of non-Hodgkin's lymphoma, history of TIA, GERD, BPH, hypoalbuminemia    RECOMMENDATIONS:  Appears relatively euvolemic, still short of breath. A. fib rates reasonably well controlled.  Increase oral furosemide to 40 twice daily   Check FOBT if not done   Continue current cardiac medications including dose adjusted apixaban, aspirin 81, metoprolol succinate 37.5 mg twice daily, rosuvastatin   Risk factor modification    Kim Dooley MD  [facility]) Cardiology    NOTE: This report was transcribed using voice recognition software. Every effort was made to ensure accuracy; however, inadvertent computerized transcription errors may be present.

## 2020-08-02 NOTE — PROGRESS NOTES
Pulmonary [address] [facility]                             Pulmonary Consult/Progress Note :              Consulting Physician:Bilateral Pleural effusion       Reason for Consultation:bilateral effusion   CC : SOB   HPI:   Still with cough and SOB   Fluid exudate ,so could be para pneumonic   Feels sick   On Eliquis   No chest pain       PHYSICAL EXAMINATION:     VITAL SIGNS:  /60   Pulse 100   Temp 99.1 °F (37.3 °C) (Core)   Resp 16   Ht 6' (1.829 m)   Wt 202 lb (91.6 kg)   SpO2 98%   BMI 27.40 kg/m²   Wt Readings from Last 3 Encounters:   08/01/20 202 lb (91.6 kg)   10/31/19 220 lb (99.8 kg)   10/14/19 224 lb (101.6 kg)     Temp Readings from Last 3 Encounters:   08/01/20 99.1 °F (37.3 °C) (Core)     TMAX:  BP Readings from Last 3 Encounters:   08/01/20 102/60   10/31/19 118/60   10/14/19 120/68     Pulse Readings from Last 3 Encounters:   08/01/20 100   10/31/19 78   10/14/19 100           INTAKE/OUTPUTS:  I/O last 3 completed shifts:   In: 500 [P.O.:500]  Out: 1725 [Urine:1725]    Intake/Output Summary (Last 24 hours) at 8/1/2020 2155  Last data filed at 8/1/2020 1911  Gross per 24 hour   Intake 680 ml   Output 2025 ml   Net -1345 ml       General Appearance: alert and oriented to person, place and time, well-developed and   well-nourished, in no acute distress   Eyes: pupils equal, round, and reactive to light, extraocular eye movements intact, conjunctivae normal and sclera anicteric   Neck: neck supple and non tender without mass, no thyromegaly, no thyroid nodules and no cervical adenopathy   Pulmonary/Chest:decrease braeth sound bilateral    Cardiovascular: normal rate, regular rhythm, normal S1 and S2, no murmurs, rubs, clicks or gallops, distal pulses intact, no carotid bruits, no murmurs, no gallops, no carotid bruits and no JVD   Abdomen: obese, soft, non-tender, non-distended, normal bowel sounds, no masses or organomegaly   Extremities no edema

## 2020-08-02 NOTE — PROGRESS NOTES
[facility]  Internal Medicine Residency Program  Progress Note - House Team 2    Patient:  Abdullahi Saucedo 80 y.o. male MRN: [id number]     Date of Service: 8/1/2020     CC: SOB and bilateral LE edema  Overnight events: None  Hospital Day: 6    Subjective     Patient reports that he still feels some SOB. Is on 2 L O2 NC. Also reports left knee pain that has been progressive for the last several months. Says he has been unable to lift the left lower extremity since last week. Objective     Physical Exam:  Vitals: BP (!) 105/50   Pulse 97   Temp 99.1 °F (37.3 °C) (Core)   Resp 16   Ht 6' (1.829 m)   Wt 202 lb (91.6 kg)   SpO2 98%   BMI 27.40 kg/m²     I & O - 24hr:     Intake/Output Summary (Last 24 hours) at 8/1/2020 2046  Last data filed at 8/1/2020 1911  Gross per 24 hour   Intake 680 ml   Output 2025 ml   Net -1345 ml       · General Appearance: alert, appears stated age, cooperative and no distress  · HEENT:  Head: Normocephalic, no lesions, without obvious abnormality. · Neck: no adenopathy, no carotid bruit, no JVD, supple, symmetrical, trachea midline and thyroid not enlarged, symmetric, no tenderness/mass/nodules  · Lung: diminished breath sounds bibasilar and right middle. No wheezes, rales, or rhonchi noted. · Heart: regular rate and rhythm, S1, S2 normal, no murmur, click, rub or gallop  · Abdomen: soft, non-tender; bowel sounds normal; no masses,  no organomegaly  · Extremities:  No cyanosis or edema. · Musculokeletal: No joint swelling. Has significantly decreased range of motion in the left hip and knee with pain on movement. Not calf tenderness. No erythema of the lower extremities. Normal range of motion in the left lower extremity. · Neurologic: Mental status: Alert, oriented, thought content appropriate. Negative babinski on the left side.     Pertinent Labs & Imaging Studies     CBC:   Lab Results   Component Value Date    WBC 6.1 08/01/2020    RBC 4.25 08/01/2020 HGB 11.8 08/01/2020    HCT 37.2 08/01/2020    MCV 87.5 08/01/2020    MCH 27.8 08/01/2020    MCHC 31.7 08/01/2020    RDW 15.3 08/01/2020     08/01/2020    MPV 9.3 08/01/2020     CMP:    Lab Results   Component Value Date     08/01/2020    K 4.5 08/01/2020    CL 99 08/01/2020    CO2 23 08/01/2020    BUN 42 08/01/2020    CREATININE 1.5 08/01/2020    GFRAA 53 08/01/2020    LABGLOM 44 08/01/2020    GLUCOSE 100 08/01/2020    PROT 6.1 08/01/2020    LABALBU 3.3 08/01/2020    CALCIUM 9.5 08/01/2020    BILITOT 0.8 08/01/2020    ALKPHOS 68 08/01/2020    AST 15 08/01/2020    ALT 11 08/01/2020     Hepatic Function Panel:    Lab Results   Component Value Date    ALKPHOS 68 08/01/2020    ALT 11 08/01/2020    AST 15 08/01/2020    PROT 6.1 08/01/2020    BILITOT 0.8 08/01/2020    LABALBU 3.3 08/01/2020     Last 3 Troponin:    Lab Results   Component Value Date    TROPONINI 0.01 07/28/2020    TROPONINI 0.03 07/28/2020    TROPONINI 0.02 07/27/2020     3 views of the pelvis and left hip are obtained.         These images reveal no evidence for acute displaced cortical    disruption or dislocation. There is osteophytosis and eburnation of    the sacroiliac and acetabular articulating surfaces. Degenerative    spondylosis and facet arthropathy are identified in the lower lumbar    spine. Otherwise the regional soft tissue and osseous structures are    unremarkable.         Impression    No acute fracture is identified. 4 views of the left knee are obtained.         There is tricompartmental loss of joint space, osteophytosis and    eburnation. There is mild diffuse bone demineralization. Arterial    calcifications are present. Otherwise the regional soft tissue and    osseous structures are unremarkable. Impression    No acute fracture is identified.         Osteoarthritis.         Osteopenia.       Resident's Assessment and Plan     Shayne Linton a 79 y/o male with PHMx of A fib, HTN, HLD, anemia, and lymphoma who presented with SOB and bilateral LE edema. CTA chest significant for bilateral pleural effusion, no evidence of PE. Troponin negative, pro-BNP elevated at 4687. Echo showed normal left ventricle size and systolic function. EF 60-65%. The left atrium is moderately dilated. US duplex of the bilateral lower extremities showed no evidence of DVT. He underwent right thoracentesis 7/30 with removal of 900 mL of serosanguinous fluid. CXR showed slight improvement in the right pleural effusion. 1. Acute CHF exacerbation with bilateral pleural effusions  Improved. Unlikely to be related to acute ischemia: troponin negative x3 and EKG with no ST changes. Has improvement of his SOB and resolution of LE edema. S/P right thoracentesis on 7/30. No further thoracentesis planned.  Continue Lasix 40 mg PO daily    2. Atrial fibrillation with RVR  Rate is controlled today, low 90's.  Continue Eliquis 2.5 mg BID   Toprol XL 37.5 mg BID    3. CKD stage 3  Creatinine 1.5, stable since admission.  Follow BMP    4. Normocytic anemia  Hgb normal last few days. 12.6 today. Patient reports dark and tarry stools. Per GI, unlikely to have been having melena as his hct has been stable.  Plan for EGD in the near future    5. GERD  Has substernal burning. Improved with Carafate. · Continue Carafate    6. Left knee pain and decreased ROM  Bilateral hips and left knee XR with osteoarthritis and osteopenia. · Will have PT work with patient    7. UTI  Hx of BPH. UA was positive for WBC, nitrites, and bacteria. Asymptomatic. Was on Ceftriaxone, discontinued. · Flomax 0.4 mg daily    8. HLD  · Rosuvastatin 5 mg QHS    9.  Hx of lymphoma    PT/OT evaluation: PT [facility] 13/24, OT [facility] 15/24  DVT prophylaxis/GI prophylaxis: Eliquis/Protonix  Disposition: Continue current care    Paris Olivas MD,  PGY-1  Attending Physician: Dr. Lisa Paulino

## 2020-08-02 NOTE — PLAN OF CARE
Problem:  Activity:  Goal: Capacity to carry out activities will improve  Description: Capacity to carry out activities will improve  8/2/2020 0045 by Rom Pendleton RN  Outcome: Met This Shift  8/2/2020 0001 by Kamilla Ambrosio RN  Outcome: Met This Shift  Goal: Will verbalize the importance of balancing activity with adequate rest periods  Description: Will verbalize the importance of balancing activity with adequate rest periods  8/2/2020 0045 by Rom Pendleton RN  Outcome: Met This Shift  8/2/2020 0001 by Kamilla Ambrosio RN  Outcome: Met This Shift     Problem: Cardiac:  Goal: Hemodynamic stability will improve  Description: Hemodynamic stability will improve  8/2/2020 0045 by Rom Pendleton RN  Outcome: Met This Shift  8/2/2020 0001 by Kamilla Ambrosio RN  Outcome: Met This Shift  Goal: Ability to maintain an adequate cardiac output will improve  Description: Ability to maintain an adequate cardiac output will improve  8/2/2020 0045 by Rom Pendleton RN  Outcome: Met This Shift  8/2/2020 0001 by Kamilla Ambrosio RN  Outcome: Met This Shift     Problem: Fluid Volume:  Goal: Risk for excess fluid volume will decrease  Description: Risk for excess fluid volume will decrease  8/2/2020 0001 by Kamilla Ambrosio RN  Outcome: Met This Shift  Goal: Maintenance of adequate hydration will improve  Description: Maintenance of adequate hydration will improve  8/2/2020 0001 by Kamilla Ambrosio RN  Outcome: Met This Shift     Problem: Skin Integrity:  Goal: Will show no infection signs and symptoms  Description: Will show no infection signs and symptoms  8/2/2020 0001 by Kamilla Ambrosio RN  Outcome: Met This Shift  Goal: Absence of new skin breakdown  Description: Absence of new skin breakdown  8/2/2020 0001 by Kamilla Ambrosio RN  Outcome: Met This Shift

## 2020-08-02 NOTE — PROGRESS NOTES
[address]   Internal Medicine Residency / [address]    Attending Physician Statement  I have discussed the case, including pertinent history and exam findings with the resident and the team.  I have seen and examined the patient and the key elements of the encounter have been performed by me. I agree with the assessment, plan and orders as documented by the resident. He is up in  chair with one assist transfer  A&O  VS stable   Appears to be using LLE  And disuse may be due to severe OA left knee  Will continue analgesic balm to knee  Plus Tylenol  Hx of Lymphoma, HTN, HLD< CAD and AFib   S/P Thoracentesis  Plan; Review meds including Eliquis and Flomax            JANET? Remainder of medical problems as per resident note.       Ally Oviedo  Internal Medicine Residency Faculty

## 2020-08-03 LAB
ALBUMIN SERPL-MCNC: 3.3 G/DL (ref 3.5–5.2)
ALP BLD-CCNC: 72 U/L (ref 40–129)
ALT SERPL-CCNC: 11 U/L (ref 0–40)
ANION GAP SERPL CALCULATED.3IONS-SCNC: 10 MMOL/L (ref 7–16)
AST SERPL-CCNC: 16 U/L (ref 0–39)
BASOPHILS ABSOLUTE: 0.02 E9/L (ref 0–0.2)
BASOPHILS RELATIVE PERCENT: 0.4 % (ref 0–2)
BILIRUB SERPL-MCNC: 0.8 MG/DL (ref 0–1.2)
BUN BLDV-MCNC: 41 MG/DL (ref 8–23)
CALCIUM SERPL-MCNC: 9.9 MG/DL (ref 8.6–10.2)
CHLORIDE BLD-SCNC: 101 MMOL/L (ref 98–107)
CO2: 27 MMOL/L (ref 22–29)
CREAT SERPL-MCNC: 1.5 MG/DL (ref 0.7–1.2)
EOSINOPHILS ABSOLUTE: 0.27 E9/L (ref 0.05–0.5)
EOSINOPHILS RELATIVE PERCENT: 5.4 % (ref 0–6)
GFR AFRICAN AMERICAN: 53
GFR NON-AFRICAN AMERICAN: 44 ML/MIN/1.73
GLUCOSE BLD-MCNC: 104 MG/DL (ref 74–99)
HCT VFR BLD CALC: 36.7 % (ref 37–54)
HEMOGLOBIN: 11.5 G/DL (ref 12.5–16.5)
IMMATURE GRANULOCYTES #: 0.03 E9/L
IMMATURE GRANULOCYTES %: 0.6 % (ref 0–5)
LYMPHOCYTES ABSOLUTE: 0.99 E9/L (ref 1.5–4)
LYMPHOCYTES RELATIVE PERCENT: 19.8 % (ref 20–42)
MCH RBC QN AUTO: 27.4 PG (ref 26–35)
MCHC RBC AUTO-ENTMCNC: 31.3 % (ref 32–34.5)
MCV RBC AUTO: 87.6 FL (ref 80–99.9)
MONOCYTES ABSOLUTE: 0.67 E9/L (ref 0.1–0.95)
MONOCYTES RELATIVE PERCENT: 13.4 % (ref 2–12)
NEUTROPHILS ABSOLUTE: 3.01 E9/L (ref 1.8–7.3)
NEUTROPHILS RELATIVE PERCENT: 60.4 % (ref 43–80)
PDW BLD-RTO: 14.6 FL (ref 11.5–15)
PLATELET # BLD: 194 E9/L (ref 130–450)
PMV BLD AUTO: 8.7 FL (ref 7–12)
POTASSIUM REFLEX MAGNESIUM: 4.8 MMOL/L (ref 3.5–5)
PRO-BNP: 4398 PG/ML (ref 0–450)
RBC # BLD: 4.19 E12/L (ref 3.8–5.8)
REPORT: NORMAL
SODIUM BLD-SCNC: 138 MMOL/L (ref 132–146)
THIS TEST SENT TO: NORMAL
TOTAL PROTEIN: 6.1 G/DL (ref 6.4–8.3)
WBC # BLD: 5 E9/L (ref 4.5–11.5)

## 2020-08-03 PROCEDURE — 36415 COLL VENOUS BLD VENIPUNCTURE: CPT

## 2020-08-03 PROCEDURE — 2580000003 HC RX 258: Performed by: STUDENT IN AN ORGANIZED HEALTH CARE EDUCATION/TRAINING PROGRAM

## 2020-08-03 PROCEDURE — 99232 SBSQ HOSP IP/OBS MODERATE 35: CPT | Performed by: INTERNAL MEDICINE

## 2020-08-03 PROCEDURE — 6370000000 HC RX 637 (ALT 250 FOR IP): Performed by: STUDENT IN AN ORGANIZED HEALTH CARE EDUCATION/TRAINING PROGRAM

## 2020-08-03 PROCEDURE — 80053 COMPREHEN METABOLIC PANEL: CPT

## 2020-08-03 PROCEDURE — 6370000000 HC RX 637 (ALT 250 FOR IP): Performed by: INTERNAL MEDICINE

## 2020-08-03 PROCEDURE — 2700000000 HC OXYGEN THERAPY PER DAY

## 2020-08-03 PROCEDURE — 85025 COMPLETE CBC W/AUTO DIFF WBC: CPT

## 2020-08-03 PROCEDURE — 99231 SBSQ HOSP IP/OBS SF/LOW 25: CPT | Performed by: INTERNAL MEDICINE

## 2020-08-03 PROCEDURE — 1200000000 HC SEMI PRIVATE

## 2020-08-03 PROCEDURE — 83880 ASSAY OF NATRIURETIC PEPTIDE: CPT

## 2020-08-03 RX ADMIN — METOPROLOL SUCCINATE 37.5 MG: 25 TABLET, EXTENDED RELEASE ORAL at 08:38

## 2020-08-03 RX ADMIN — ROSUVASTATIN CALCIUM 5 MG: 5 TABLET, FILM COATED ORAL at 21:35

## 2020-08-03 RX ADMIN — POTASSIUM CHLORIDE 20 MEQ: 1500 TABLET, EXTENDED RELEASE ORAL at 19:07

## 2020-08-03 RX ADMIN — SODIUM CHLORIDE, PRESERVATIVE FREE 10 ML: 5 INJECTION INTRAVENOUS at 21:37

## 2020-08-03 RX ADMIN — TAMSULOSIN HYDROCHLORIDE 0.4 MG: 0.4 CAPSULE ORAL at 08:38

## 2020-08-03 RX ADMIN — SODIUM CHLORIDE, PRESERVATIVE FREE 10 ML: 5 INJECTION INTRAVENOUS at 08:39

## 2020-08-03 RX ADMIN — SUCRALFATE 1 G: 1 TABLET ORAL at 12:13

## 2020-08-03 RX ADMIN — DICLOFENAC SODIUM 4 G: 10 GEL TOPICAL at 12:13

## 2020-08-03 RX ADMIN — APIXABAN 2.5 MG: 5 TABLET, FILM COATED ORAL at 08:39

## 2020-08-03 RX ADMIN — SUCRALFATE 1 G: 1 TABLET ORAL at 05:57

## 2020-08-03 RX ADMIN — POTASSIUM CHLORIDE 20 MEQ: 1500 TABLET, EXTENDED RELEASE ORAL at 08:39

## 2020-08-03 RX ADMIN — SUCRALFATE 1 G: 1 TABLET ORAL at 19:25

## 2020-08-03 RX ADMIN — FUROSEMIDE 40 MG: 40 TABLET ORAL at 08:39

## 2020-08-03 RX ADMIN — Medication 1000 MCG: at 08:39

## 2020-08-03 RX ADMIN — PANTOPRAZOLE SODIUM 40 MG: 40 TABLET, DELAYED RELEASE ORAL at 05:57

## 2020-08-03 RX ADMIN — ASPIRIN 81 MG: 81 TABLET, COATED ORAL at 08:39

## 2020-08-03 RX ADMIN — APIXABAN 2.5 MG: 5 TABLET, FILM COATED ORAL at 21:35

## 2020-08-03 RX ADMIN — FUROSEMIDE 40 MG: 40 TABLET ORAL at 19:07

## 2020-08-03 RX ADMIN — FOLIC ACID 1 MG: 1 TABLET ORAL at 08:39

## 2020-08-03 RX ADMIN — SUCRALFATE 1 G: 1 TABLET ORAL at 23:55

## 2020-08-03 ASSESSMENT — PAIN DESCRIPTION - PROGRESSION
CLINICAL_PROGRESSION: NOT CHANGED
CLINICAL_PROGRESSION: NOT CHANGED

## 2020-08-03 ASSESSMENT — PAIN SCALES - GENERAL
PAINLEVEL_OUTOF10: 0

## 2020-08-03 NOTE — PLAN OF CARE
Problem:  Activity:  Goal: Capacity to carry out activities will improve  Description: Capacity to carry out activities will improve  8/2/2020 2228 by Elo Ball RN  Outcome: Met This Shift  Goal: Will verbalize the importance of balancing activity with adequate rest periods  Description: Will verbalize the importance of balancing activity with adequate rest periods  8/2/2020 2228 by Elo Ball RN  Outcome: Met This Shift     Problem: Cardiac:  Goal: Hemodynamic stability will improve  Description: Hemodynamic stability will improve  8/3/2020 0032 by Francheska Abdi RN  Outcome: Met This Shift  8/2/2020 2228 by Elo Ball RN  Outcome: Met This Shift  Goal: Ability to maintain an adequate cardiac output will improve  Description: Ability to maintain an adequate cardiac output will improve  8/3/2020 0032 by Francheska Abdi RN  Outcome: Met This Shift  8/2/2020 2228 by Elo Ball RN  Outcome: Met This Shift     Problem: Fluid Volume:  Goal: Risk for excess fluid volume will decrease  Description: Risk for excess fluid volume will decrease  8/2/2020 2228 by Elo Ball RN  Outcome: Met This Shift  Goal: Maintenance of adequate hydration will improve  Description: Maintenance of adequate hydration will improve  8/2/2020 2228 by Elo Ball RN  Outcome: Met This Shift  Goal: Will show no signs and symptoms of electrolyte imbalance  Description: Will show no signs and symptoms of electrolyte imbalance  8/3/2020 0032 by Francheska Abdi RN  Outcome: Met This Shift  8/2/2020 2228 by Elo Ball RN  Outcome: Met This Shift     Problem: Pain:  Goal: Pain level will decrease  Description: Pain level will decrease  Outcome: Met This Shift  Goal: Control of acute pain  Description: Control of acute pain  Outcome: Met This Shift  Goal: Control of chronic pain  Description: Control of chronic pain  Outcome: Met This Shift

## 2020-08-03 NOTE — PROGRESS NOTES
[facility]  Internal Medicine Residency Program  Progress Note - House Team 2    Patient:  Taz Gomez 80 y.o. male MRN: [id number]     Date of Service: 8/3/2020     CC: SOB and bilateral LE edema  Overnight events: None  Hospital Day: 8    Subjective     Patient says he feels stable from yesterday and still feels mildly SOB laying in bed. Was on 3 L NC. Still complains of pinpoint burning pain just inferior to the sternum that feels deep. He clarifies that he cannot move his left lower extremity well due to left knee pain, not weakness. Denies left hip pain. The diclofenac has not provided much relief. Objective     Physical Exam:  Vitals: BP (!) 98/53   Pulse 74   Temp 98.2 °F (36.8 °C) (Temporal)   Resp 16   Ht 6' (1.829 m)   Wt 195 lb 7 oz (88.6 kg)   SpO2 100%   BMI 26.51 kg/m²     I & O - 24hr:     Intake/Output Summary (Last 24 hours) at 8/3/2020 1824  Last data filed at 8/3/2020 1603  Gross per 24 hour   Intake 980 ml   Output 1850 ml   Net -870 ml       · General Appearance: alert, appears stated age, cooperative and no distress  · HEENT:  Head: Normocephalic, no lesions, without obvious abnormality. · Neck: no adenopathy, no carotid bruit, no JVD, supple, symmetrical, trachea midline and thyroid not enlarged, symmetric, no tenderness/mass/nodules  · Lung: diminished breath sounds bibasilar and right middle. No wheezes, rales, or rhonchi noted. · Heart: regular rate and rhythm, S1, S2 normal, no murmur, click, rub or gallop  · Abdomen: soft, non-tender; bowel sounds normal; no masses,  no organomegaly  · Extremities:  No cyanosis or edema. · Musculokeletal: No joint swelling. Has decreased range of motion in the left knee with pain on movement. Good range of motion in the left hip and has 4/5 left hip flexion. No calf tenderness. No erythema of the lower extremities. Normal range of motion in the right lower extremity.   · Neurologic: Mental status: Alert, oriented, thought content appropriate. Pertinent Labs & Imaging Studies     CBC:   Lab Results   Component Value Date    WBC 5.0 08/03/2020    RBC 4.19 08/03/2020    HGB 11.5 08/03/2020    HCT 36.7 08/03/2020    MCV 87.6 08/03/2020    MCH 27.4 08/03/2020    MCHC 31.3 08/03/2020    RDW 14.6 08/03/2020     08/03/2020    MPV 8.7 08/03/2020     CMP:    Lab Results   Component Value Date     08/03/2020    K 4.8 08/03/2020     08/03/2020    CO2 27 08/03/2020    BUN 41 08/03/2020    CREATININE 1.5 08/03/2020    GFRAA 53 08/03/2020    LABGLOM 44 08/03/2020    GLUCOSE 104 08/03/2020    PROT 6.1 08/03/2020    LABALBU 3.3 08/03/2020    CALCIUM 9.9 08/03/2020    BILITOT 0.8 08/03/2020    ALKPHOS 72 08/03/2020    AST 16 08/03/2020    ALT 11 08/03/2020     Hepatic Function Panel:    Lab Results   Component Value Date    ALKPHOS 72 08/03/2020    ALT 11 08/03/2020    AST 16 08/03/2020    PROT 6.1 08/03/2020    BILITOT 0.8 08/03/2020    LABALBU 3.3 08/03/2020     Last 3 Troponin:    Lab Results   Component Value Date    TROPONINI 0.01 07/28/2020    TROPONINI 0.03 07/28/2020    TROPONINI 0.02 07/27/2020     3 views of the pelvis and left hip are obtained.         These images reveal no evidence for acute displaced cortical    disruption or dislocation. There is osteophytosis and eburnation of    the sacroiliac and acetabular articulating surfaces. Degenerative    spondylosis and facet arthropathy are identified in the lower lumbar    spine. Otherwise the regional soft tissue and osseous structures are    unremarkable.         Impression    No acute fracture is identified. 4 views of the left knee are obtained.         There is tricompartmental loss of joint space, osteophytosis and    eburnation. There is mild diffuse bone demineralization. Arterial    calcifications are present. Otherwise the regional soft tissue and    osseous structures are unremarkable.     Impression    No acute fracture is identified.      Osteoarthritis.         Osteopenia. Resident's Assessment and Plan     Xiomara Vee a 81 y/o male with PHMx of A fib, HTN, HLD, anemia, and lymphoma who presented with SOB and bilateral LE edema. CTA chest significant for bilateral pleural effusion, no evidence of PE. Troponin negative, pro-BNP elevated at 4687. Echo showed normal left ventricle size and systolic function. EF 60-65%. The left atrium is moderately dilated. US duplex of the bilateral lower extremities showed no evidence of DVT. He underwent right thoracentesis 7/30 with removal of 900 mL of serosanguinous fluid. CXR showed slight improvement in the right pleural effusion. 1. Acute CHF exacerbation with bilateral pleural effusions  Improved. Unlikely to be related to acute ischemia: troponin negative x3 and EKG with no ST changes. Has improvement of his SOB and resolution of LE edema. S/P right thoracentesis on 7/30. No further thoracentesis planned. Net negative -820 the past 24 hours. Weight 206 lb -> 195 lb.  Continue Lasix 40 mg PO BID    2. Atrial fibrillation with RVR  Rate is controlled today, low 90's.  Continue Eliquis 2.5 mg BID   Toprol XL 37.5 mg BID    3. CKD stage III  Creatinine 1.5, stable since admission.  Follow BMP    4. Normocytic anemia  Hgb normal last few days. 12.6 today. Patient reports dark and tarry stools. Per GI, unlikely to have been having melena as his hct has been stable.  Plan for EGD in the near future    5. GERD/PUD  Has substernal burning. Slight improvement with Carafate. · Continue Carafate and Protonix    6. Left knee pain and decreased ROM  Bilateral hips and left knee XR with osteoarthritis and osteopenia. · Will have PT work with patient  · Diclofenac gel to left knee QID PRN    7. UTI  Hx of BPH. UA was positive for WBC, nitrites, and bacteria. Asymptomatic. Was on Ceftriaxone, discontinued. · Flomax 0.4 mg daily    8. HLD  · Rosuvastatin 5 mg QHS    9.  Hx of lymphoma    PT/OT evaluation: PT [facility] 12/24, OT [facility] 16/24  DVT prophylaxis/GI prophylaxis: Eliquis/Protonix  Disposition: Continue current care. Awaiting pre-cert, possible discharge to \[facility]\"".[facility] on Wednesday.     Kiel Nichols MD,  PGY-1  Attending Physician: Dr. Daryl Chao

## 2020-08-03 NOTE — PROGRESS NOTES
INPATIENT CARDIOLOGY FOLLOW-UP    Name: Joby Diallo    Age: 80 y.o. Date of Admission: 7/27/2020  8:13 AM    Date of Service: 8/3/2020    Primary Cardiologist: Known to Dr. Baker Reason from this admission    Chief Complaint: Follow-up for CHF, atrial fibrillation, CAD    Interim History:  Underwent thoracentesis 7/30/2020 900ml  States still short of breath. Reports mild vague chest discomfort present worse with deep breathing, has been constant forweeks  Also complaining of left leg pain, states he cannot walk  Net -6.5L  A. fib rate controlled on telemetry    Review of Systems:   Negative except as described above    Problem List:  Patient Active Problem List   Diagnosis    [facility]    Anemia    Lymphoma ([facility]    Hyperlipidemia    Hypertension    CAD (coronary artery disease)    Presence of drug coated stent in LAD coronary artery    Obesity (BMI 30.0-34. 9)    Congestive heart failure (HCC)    Acute diastolic heart failure with preserved ejection fraction (HCC)    Bilateral pleural effusion    Stage 3 chronic kidney disease (HCC)    Hemoptysis       Current Medications:    Current Facility-Administered Medications:     furosemide (LASIX) tablet 40 mg, 40 mg, Oral, BID, Katelynn King MD, 40 mg at 08/02/20 1754    diclofenac sodium (VOLTAREN) 1 % gel 4 g, 4 g, Topical, 4x Daily PRN, Wilda Byrnes MD, 4 g at 08/02/20 2042    apixaban (ELIQUIS) tablet 2.5 mg, 2.5 mg, Oral, BID, Jennifer Yu MD, 2.5 mg at 08/02/20 2042    sucralfate (CARAFATE) tablet 1 g, 1 g, Oral, 4 times per day, Brit Cobb MD, 1 g at 08/03/20 0557    metoprolol succinate (TOPROL XL) extended release tablet 37.5 mg, 37.5 mg, Oral, BID, Jennifer Yu MD, 37.5 mg at 08/02/20 2040    aluminum & magnesium hydroxide-simethicone (MAALOX) 30 mL, lidocaine viscous hcl (XYLOCAINE) 5 mL (GI COCKTAIL), , Oral, Once, Sandeep Patel MD    perflutren lipid microspheres (DEFINITY) injection 1.65 mg, 1.5 mL, Intravenous, ONCE PRN, Aurea Antoine MD    potassium chloride (KLOR-CON M) extended release tablet 20 mEq, 20 mEq, Oral, BID WC, Aurea Antoine MD, 20 mEq at 08/02/20 1754    sodium chloride flush 0.9 % injection 10 mL, 10 mL, Intravenous, PRN, Viridiana Bob II, MD, 10 mL at 07/28/20 1135    aspirin EC tablet 81 mg, 81 mg, Oral, Daily, Adrianna Mckeon MD, 81 mg at 08/02/20 0844    pantoprazole (PROTONIX) tablet 40 mg, 40 mg, Oral, QAM AC, Adrianna Mckeon MD, 40 mg at 04/82/36 8042    folic acid (FOLVITE) tablet 1 mg, 1 mg, Oral, Daily, Adrianna Mckeon MD, 1 mg at 08/02/20 9558    rosuvastatin (CRESTOR) tablet 5 mg, 5 mg, Oral, Nightly, Adrianna Mckeon MD, 5 mg at 08/02/20 2040    tamsulosin (FLOMAX) capsule 0.4 mg, 0.4 mg, Oral, Daily, Adrianna Mckeon MD, 0.4 mg at 08/02/20 2604    vitamin B-12 (CYANOCOBALAMIN) tablet 1,000 mcg, 1,000 mcg, Oral, Daily, Adrianna Mckeon MD, 1,000 mcg at 08/02/20 0463    sodium chloride flush 0.9 % injection 10 mL, 10 mL, Intravenous, 2 times per day, Adrianna Mckeon MD, 10 mL at 08/02/20 2042    acetaminophen (TYLENOL) tablet 650 mg, 650 mg, Oral, Q6H PRN **OR** acetaminophen (TYLENOL) suppository 650 mg, 650 mg, Rectal, Q6H PRN, Adrianna Mckeon MD    polyethylene glycol (GLYCOLAX) packet 17 g, 17 g, Oral, Daily PRN, Adrianna Mckeon MD    promethazine (PHENERGAN) tablet 12.5 mg, 12.5 mg, Oral, Q6H PRN **OR** ondansetron (ZOFRAN) injection 4 mg, 4 mg, Intravenous, Q6H PRN, Adrianna Mckeon MD    Physical Exam:  BP (!) 110/52   Pulse 96   Temp 98.2 °F (36.8 °C) (Temporal)   Resp 18   Ht 6' (1.829 m)   Wt 195 lb 7 oz (88.6 kg)   SpO2 98%   BMI 26.51 kg/m²   Wt Readings from Last 3 Encounters:   08/03/20 195 lb 7 oz (88.6 kg)   10/31/19 220 lb (99.8 kg)   10/14/19 224 lb (101.6 kg)     Appearance: Elderly gentleman, awake, alert, no acute respiratory distress  Skin: Intact, no rash  Head: Normocephalic, atraumatic  Eyes: EOMI, no conjunctival erythema  ENMT: No pharyngeal erythema, MMM, no rhinorrhea  Neck: Supple, no elevated JVP, no carotid bruits  Lungs: Diminished bases, trace bibasilar rales. Cardiac: PMI nondisplaced, irregular rhythm with a normal rate, S1 & S2 normal, no murmurs  Abdomen: Soft, nontender, +bowel sounds  Extremities: Moves all extremities x 4, no lower extremity edema  Neurologic: No focal motor deficits apparent, normal mood and affect  Peripheral Pulses: Intact posterior tibial pulses bilaterally    Intake/Output:    Intake/Output Summary (Last 24 hours) at 8/3/2020 0658  Last data filed at 8/3/2020 0516  Gross per 24 hour   Intake 780 ml   Output 1600 ml   Net -820 ml     I/O this shift:  In: -   Out: 450 [Urine:450]    Laboratory Tests:  Recent Labs     08/01/20  0511 08/02/20  0610    136   K 4.5 4.7   CL 99 99   CO2 23 27   BUN 42* 40*   CREATININE 1.5* 1.6*   GLUCOSE 100* 93   CALCIUM 9.5 10.0     No results found for: MG  Recent Labs     08/01/20  0511 08/02/20  0610   ALKPHOS 68 70   ALT 11 11   AST 15 15   PROT 6.1* 6.2*   BILITOT 0.8 0.9   LABALBU 3.3* 3.3*     Recent Labs     08/01/20  0511 08/02/20  0610   WBC 6.1 5.9   RBC 4.25 4.12   HGB 11.8* 11.4*   HCT 37.2 35.7*   MCV 87.5 86.7   MCH 27.8 27.7   MCHC 31.7* 31.9*   RDW 15.3* 14.8    198   MPV 9.3 9.0     Lab Results   Component Value Date    CKMB 2.1 07/28/2020    TROPONINI 0.01 07/28/2020    TROPONINI 0.03 07/28/2020    TROPONINI 0.02 07/27/2020     Lab Results   Component Value Date    INR 1.2 07/27/2020    PROTIME 14.0 (H) 07/27/2020     Lab Results   Component Value Date    TSH 1.880 07/27/2020       Cardiac Tests:    EKG: Atrial fibrillation 114 bpm.  Normal axis normal intervals. Low voltage. Nonspecific ST changes. Telemetry: Atrial fibrillation the 80-90s    Chest X-ray:   7/30/2020 postthoracentesis  FINDINGS:      There has been mild decrease in the right pleural effusion since the    prior study.  Pleural thickening/fluid left costophrenic angle is also    suspected. Heart size is borderline enlarged. There are no acute    infiltrates.              Impression    Mild decrease in right-sided pleural effusion since the prior study           2 view chest x-ray 8/1/2020, personally reviewed    The heart is enlarged. The lung fields demonstrate evidence for airspace disease. Infiltrate   is seen at both lung bases as well as to lesser extent a nodular   infiltrate in the left upper lung   The aorta is tortuous and ectatic.        Impression:         Tortuous ectatic aorta   Cardiomegaly   Airspace disease compatible with atelectasis or pneumonia, at the lung   bases superimposed on moderate bilateral pleural effusions   The chest does not appear to be significantly changed in the interval        Echocardiogram:   Transthoracic echo 7/28/2020   Summary   Normal left ventricle size and systolic function. Ejection fraction is visually estimated at 60-65%. Atrial fibrillation may   affect the evaluation of LV systolic function. No regional wall motion abnormalities seen. Normal left ventricle wall thickness. Indeterminate diastolic function. The left atrium is moderately dilated. Normal right ventricular size and function. TAPSE 196 mm. Physiologic and/or trace mitral regurgitation is present. There is mild aortic stenosis with valve area of 1.7 sq cm. Physiologic and/or trace tricuspid regurgitation. RVSP is 31 mmHg. Normal estimated PA systolic pressure. Mildly dilated aortic root (4.0 cm) and normal sized ascending aorta. No evidence for hemodynamically significant pericardial effusion. No previous echo for comparison. Stress test:    Pharmacologic nuclear stress test-June 2018 ([facility]): LVEF 52%, there is resting perfusion abnormality in the inferior septal wall consistent with a prior infarction without any reversible perfusion defect.  Inferior wall hypokinesis.     Cardiac catheterization: None

## 2020-08-03 NOTE — PROGRESS NOTES
13.4 08/03/2020    BASOPCT 0.4 08/03/2020    MONOSABS 0.67 08/03/2020    LYMPHSABS 0.99 08/03/2020    EOSABS 0.27 08/03/2020    BASOSABS 0.02 08/03/2020     CMP:    Lab Results   Component Value Date     08/03/2020    K 4.8 08/03/2020     08/03/2020    CO2 27 08/03/2020    BUN 41 08/03/2020    CREATININE 1.5 08/03/2020    GFRAA 53 08/03/2020    LABGLOM 44 08/03/2020    GLUCOSE 104 08/03/2020    PROT 6.1 08/03/2020    LABALBU 3.3 08/03/2020    CALCIUM 9.9 08/03/2020    BILITOT 0.8 08/03/2020    ALKPHOS 72 08/03/2020    AST 16 08/03/2020    ALT 11 08/03/2020       Resident's Assessment and Plan     Fior Collier is a 80 y.o. male with a PMHx of CAD, afib, lymphoma, HLD, HTN, LYN in LAD, anemia, asthma, renal failure, and temporal arteritis who presented with the chief complaint of dyspnea and chest tightness on exertion as well as bilateral LE edema.     1. Exertional Dyspnea secondary to acute on chronic HF exacerbation             -ECHO 07/28-EF 60-65%.  Normal LV. L atrium moderately dilated.  Normal            RV. Trace mitral regurg. Mild aortic stenosis w/ valve area 1.7 sq cm.    Trace    tricuspid regurg. Normal estimate PA pressures. Mildly dilated aortic root. Normal ascending aorta.               -elevated pro-BNP 4,876              -EKG 7/28-No significant changes to 07/27. afib w/ .              -Cardiology Consult: Continue with oral furosemide 40mg BID if kidney  function remains stable. If trending upward would reduce back to once daily. Still pan to reduce to once daily on discharge. Continue current cardiac  regimen: dose adjusted apixaban 2.5mg BID, metoprolol succinate 37.5mg  BID, rosuvastatin   -No further cardiac eval required at this time. Will see if needed. Outpatient  follow-up with Dr. Rudy Torres.   2. Bilateral pleural effusions              -most likely secondary to HF exacerbation with CKD having an additive effect              -Pulmonology consult: Thoracentesis, 07/30. Removed approximately 900 cc  of rust-colored fluid from R lung. Fluid studies ordered.                -If additional fluid drainage needed, next time will drain fluid slowly w/ pigtail. No further thoracentesis planned at this time.   -Patient wears 2L O2 at night. Plan to wean patient off of O2 in hospital.  3. R/o CAP              -Patient remaining afebrile and WBC steady and normal.                -Viral respiratory panel negative.              -CRP, procalcitonin 0.08              -Respiratory cultures sent. 4.CKD stage IIIa              -Urine electrolytes: Na 129, K 22.2, Cl 113, Cr 40              -Bun and creatinine stable at at 41 and 1.5 respectively. 5. Hx afib:               Reduce home dose of diltiazem from 180mg to 60mg BID.                Inpatient switched diltiazem to Metoprolol Succinate (Toprol XL) ER 37.5mg             BID.  Was given one dose of digoxin 250mcg.               Elliquis to be restarted 7/31.                No longer hypotensive or tachycardic.    6. Urinary Tract Infection:              UA was positive for nitrites, large leuk esterase              Microscopic UA = WBC > 20, mod bacteria              Rocephin 1g d/c              Urine Cx: <10,000 CFU/mL, gram - rods, mixed gram +  7. Left calf pain in setting of imobility and SOB              Wells score 3 with new onset hempotysis              D-dimer+elevated 1521              US Doppler showed no evidence to support DVT. 8. Burning chest and epigastric pain:              -GI cocktail given 7/29, patient reports improvement.              -Gave carafate 7/31.  Patient reports improvement. .              -GI consult: at minimum, needs upper endoscopy prior to discharge. If not            priority, they will follow.    9. HLD              -Continue rosuvastatin   10. Reported melena              -Anemia panel-demonstrating anemia of chronic disease pattern: Iron         decreased 37, TIBC decreased 185, Iron

## 2020-08-03 NOTE — PROGRESS NOTES
[facility]  Internal Medicine Residency / [address]    Attending Physician Statement  I have discussed the case, including pertinent history and exam findings with the resident and the team.  I have seen and examined the patient, reviewed meds and pertinent labs and the key elements of the encounter have been performed by me. I agree with the assessment, plan and orders as documented by the resident. Patient continues to c/o difficulty moving his left leg. Also c/o acid reflux/heart burn, states he takes \"Cave Man drops\" plus another OTC product which relieves his heartburn in a \"jiffy, just like that\". Patient currently on protonix. PT/OT assessments noted, pulmonary and cardiology input appreciated. Creatinine remains stable,  note appreciated, will continue with discharge planning. Remainder of medical problems as per resident note.       Aniket [facility] [address]  Internal Medicine Residency Faculty

## 2020-08-03 NOTE — PROGRESS NOTES
Pulmonary [facility]                             Pulmonary Consult/Progress Note :              Consulting Physician:Bilateral Pleural effusion       Reason for Consultation:bilateral effusion   CC : SOB   HPI:   Seems doing better   Less cough  . mild sob . on 3 L   Fluid exudate ,so could be para pneumonic    On Eliquis   No chest pain       PHYSICAL EXAMINATION:     VITAL SIGNS:  BP (!) 106/50   Pulse 95   Temp 97.4 °F (36.3 °C) (Temporal)   Resp 14   Ht 6' (1.829 m)   Wt 195 lb (88.5 kg)   SpO2 99%   BMI 26.45 kg/m²   Wt Readings from Last 3 Encounters:   08/02/20 195 lb (88.5 kg)   10/31/19 220 lb (99.8 kg)   10/14/19 224 lb (101.6 kg)     Temp Readings from Last 3 Encounters:   08/02/20 97.4 °F (36.3 °C) (Temporal)     TMAX:  BP Readings from Last 3 Encounters:   08/02/20 (!) 106/50   10/31/19 118/60   10/14/19 120/68     Pulse Readings from Last 3 Encounters:   08/02/20 95   10/31/19 78   10/14/19 100           INTAKE/OUTPUTS:  I/O last 3 completed shifts:   In: 900 [P.O.:900]  Out: 1350 [Urine:1350]    Intake/Output Summary (Last 24 hours) at 8/2/2020 2214  Last data filed at 8/2/2020 1955  Gross per 24 hour   Intake 780 ml   Output 1350 ml   Net -570 ml       General Appearance: alert and oriented to person, place and time, well-developed and   well-nourished, in no acute distress   Eyes: pupils equal, round, and reactive to light, extraocular eye movements intact, conjunctivae normal and sclera anicteric   Neck: neck supple and non tender without mass, no thyromegaly, no thyroid nodules and no cervical adenopathy   Pulmonary/Chest:decrease braeth sound bilateral    Cardiovascular: normal rate, regular rhythm, normal S1 and S2, no murmurs, rubs, clicks or gallops, distal pulses intact, no carotid bruits, no murmurs, no gallops, no carotid bruits and no JVD   Abdomen: obese, soft, non-tender, non-distended, normal bowel sounds, no masses or organomegaly Extremities no edema   Musculoskeletal: normal range of motion, no joint swelling, deformity or tenderness   Neurologic: reflexes normal and symmetric, no cranial nerve deficit noted    LABS/IMAGING:    CBC:  Lab Results   Component Value Date    WBC 5.9 08/02/2020    HGB 11.4 (L) 08/02/2020    HCT 35.7 (L) 08/02/2020    MCV 86.7 08/02/2020     08/02/2020    LYMPHOPCT 23.5 08/02/2020    RBC 4.12 08/02/2020    MCH 27.7 08/02/2020    MCHC 31.9 (L) 08/02/2020    RDW 14.8 08/02/2020    NEUTOPHILPCT 55.4 08/02/2020    MONOPCT 14.3 (H) 08/02/2020    BASOPCT 0.3 08/02/2020    NEUTROABS 3.24 08/02/2020    LYMPHSABS 1.38 (L) 08/02/2020    MONOSABS 0.84 08/02/2020    EOSABS 0.35 08/02/2020    BASOSABS 0.02 08/02/2020       Recent Labs     08/02/20  0610 08/01/20  0511 07/31/20  0608   WBC 5.9 6.1 6.1   HGB 11.4* 11.8* 12.6   HCT 35.7* 37.2 39.3   MCV 86.7 87.5 86.4    185 197       BMP:   Recent Labs     07/31/20  0608 08/01/20  0511 08/02/20  0610    134 136   K 4.8 4.5 4.7   CL 97* 99 99   CO2 27 23 27   BUN 38* 42* 40*   CREATININE 1.5* 1.5* 1.6*       MG: No results found for: MG  Ca/Phos:   Lab Results   Component Value Date    CALCIUM 10.0 08/02/2020     Amylase: No results found for: AMYLASE  Lipase: No results found for: LIPASE  LIVER PROFILE:   Recent Labs     07/31/20  0608 08/01/20  0511 08/02/20  0610   AST 16 15 15   ALT 12 11 11   BILITOT 0.9 0.8 0.9   ALKPHOS 72 68 70       PT/INR:   No results for input(s): PROTIME, INR in the last 72 hours. APTT:   No results for input(s): APTT in the last 72 hours.     Cardiac Enzymes:  Lab Results   Component Value Date    CKMB 2.1 07/28/2020    TROPONINI 0.01 07/28/2020                   PROBLEM LIST:  Patient Active Problem List   Diagnosis    Atrial fibrillation ([facility] [address]    Anemia    Lymphoma ([facility] [address] [facility]    Hyperlipidemia    Hypertension    CAD (coronary artery disease)    Presence of drug coated stent in LAD coronary artery    Obesity (BMI 30.0-34. 9)    Congestive heart failure (HCC)    Acute diastolic heart failure with preserved ejection fraction (HCC)    Bilateral pleural effusion    Stage 3 chronic kidney disease (HCC)    Hemoptysis               ASSESSMENT:  1.) Bilateral  pleural effusion Right more than left   2.)Medistinal adenopathy   3. )CHF   4.)Possible COPD   5. )VESTA/      PLAN:  Send sputum culture,pennding . procal still low   Repeat Procalcitonin ok   Fluid exudate ,could be related to diuresis ,still will follow closely nad may consider EBUS as OP   *-S/P Thoracentesis ,.developed chest pain ,could not finish his an drain all fluid I am concern he already developed entrapped lung ,if to be done will do pigtail drain slowly   Pending cytology/flow as fluid exudate   Need aggressive diuresis   *-we  send for flow cytometry  *-diuresis per primary  *-BD   *_NIMV if in any distress          Dmitry Murguia MD,[facility]  Pulmonary&Critical Care Medicine   Director of [address] [place] [address] Director of [address] [place] [address]    Shanthi Ayers    NOTE: This report was transcribed using voice recognition software. Every effort was made to ensure accuracy; however, inadvertent computerized transcription errors may be present.

## 2020-08-03 NOTE — DISCHARGE INSTR - COC
Continuity of Care Form    Patient Name: Governor Hoyt   :  1931  MRN:  [id number]    Admit date:  2020  Discharge date:  20    Code Status Order: Full Code   Advance Directives:   [facility] Documentation     Date/Time Healthcare Directive Type of Healthcare Directive Copy in [facility] [address] Agent's Name Healthcare Agent's Phone Number    20  No, patient does not have an advance directive for healthcare treatment -- -- -- -- --    20 1744  No, patient does not have an advance directive for healthcare treatment -- -- -- -- --          Admitting Physician:  Bryant Wilhelm DO  PCP: Mila Whitt MD    Discharging Nurse: Malcom Win Unit/Room#: 3557/5938-S  Discharging Unit Phone Number: [phone]    Emergency Contact:   Extended Emergency Contact Information  Primary Emergency Contact: Fabián Sr [address] Phone: [phone]  Relation: Child    Past Surgical History:  Past Surgical History:   Procedure Laterality Date    CATARACT REMOVAL Bilateral     CORONARY ANGIOPLASTY WITH STENT PLACEMENT      HERNIA REPAIR Right     PTCA  2009    stent to mid LAD lesion    ROTATOR CUFF REPAIR Bilateral        Immunization History: There is no immunization history on file for this patient. Active Problems:  Patient Active Problem List   Diagnosis Code    Atrial fibrillation (HCC) I48.91    Anemia D64.9    Lymphoma ([facility] [address] C85.90    Hyperlipidemia E78.5    Hypertension I10    CAD (coronary artery disease) I25.10    Presence of drug coated stent in LAD coronary artery Z95.5    Obesity (BMI 30.0-34. 9) E66.9    Congestive heart failure (HCC) I50.9    Acute diastolic heart failure with preserved ejection fraction (HCC) I50.31    Bilateral pleural effusion J90    Stage 3 chronic kidney disease (HCC) N18.3    Hemoptysis R04.2       Isolation/Infection:   Isolation          No Isolation Patient Infection Status     Infection Onset Added Last Indicated Last Indicated By Review Planned Expiration Resolved Resolved By    None active    Resolved    COVID-19 Rule Out 07/28/20 07/28/20 07/29/20 Covid-19 Ambulatory (Ordered)   07/31/20 Rule-Out Test Resulted          Nurse Assessment:  Last Vital Signs: BP (!) 98/53   Pulse 74   Temp 98.2 °F (36.8 °C) (Temporal)   Resp 16   Ht 6' (1.829 m)   Wt 195 lb 7 oz (88.6 kg)   SpO2 100%   BMI 26.51 kg/m²     Last documented pain score (0-10 scale): Pain Level: 0  Last Weight:   Wt Readings from Last 1 Encounters:   08/03/20 195 lb 7 oz (88.6 kg)     Mental Status:  oriented, alert, coherent and logical    IV Access:  - None    Nursing Mobility/ADLs:  Walking   Assisted  Transfer  Assisted  Bathing  Assisted  Dressing  Assisted  Toileting  Assisted  Feeding  Independent  Med Admin  Assisted  Med Delivery   whole    Wound Care Documentation and Therapy:  Wound 07/28/20 Buttocks Right;Medial pink/non-draining (Active)   Wound Other 08/03/20 0030   Dressing Status Clean;Dry; Intact 08/03/20 1530   Dressing Changed Changed/New 08/01/20 1912   Dressing/Treatment Other (comment) 07/28/20 1513   Wound Cleansed Not Cleansed 07/28/20 1513   Wound Length (cm) 1 cm 07/28/20 0949   Wound Width (cm) 1 cm 07/28/20 0949   Wound Depth (cm) 0.1 cm 07/28/20 0949   Wound Surface Area (cm^2) 1 cm^2 07/28/20 0949   Wound Volume (cm^3) 0.1 cm^3 07/28/20 0949   Wound Assessment Clean;Pink 08/03/20 1530   Drainage Amount None 08/03/20 1530   Odor None 08/03/20 1530   Mony-wound Assessment Clean;Dry; Intact 08/03/20 1530   Number of days: 6        Elimination:  Continence:   · Bowel:  Yes  · Bladder: Yes  Urinary Catheter: None   Colostomy/Ileostomy/Ileal Conduit: No       Date of Last BM: 08-04-20    Intake/Output Summary (Last 24 hours) at 8/3/2020 1722  Last data filed at 8/3/2020 1603  Gross per 24 hour   Intake 980 ml   Output 1850 ml   Net -870 ml     I/O last 3 completed shifts: In: 200 [P.O.:980]  Out: 1700 [Urine:1700]    Safety Concerns:         Impairments/Disabilities:      None    Nutrition Therapy:  Current Nutrition Therapy:   - Oral Diet:  General and Low Sodium (2gm)    Routes of Feeding: Oral  Liquids: Thin Liquids  Daily Fluid Restriction: no  Last Modified Barium Swallow with Video (Video Swallowing Test): not done    Treatments at the Time of Hospital Discharge:   Respiratory Treatments: ***  Oxygen Therapy:  is not on home oxygen therapy. Ventilator:    - No ventilator support    Rehab Therapies: Physical Therapy  Weight Bearing Status/Restrictions: No weight bearing restirctions  Other Medical Equipment (for information only, NOT a DME order):  walker  Other Treatments: ***    Patient's personal belongings (please select all that are sent with patient):  Vidya    RN SIGNATURE:  Electronically signed by Oumou Saldana RN on 8/5/20 at 3:38 PM EDT    CASE MANAGEMENT/SOCIAL WORK SECTION    Inpatient Status Date: ***    Readmission Risk Assessment Score:  Readmission Risk              Risk of Unplanned Readmission:        22           Discharging to Facility/ Agency   · Name:   · Address:  · Phone:  · Fax:    Dialysis Facility (if applicable)   · Name:  · Address:  · Dialysis Schedule:  · Phone:  · Fax:    / signature: {Esignature:[id number]}    PHYSICIAN SECTION    Prognosis: Good    Condition at Discharge: Stable    Rehab Potential (if transferring to Rehab): Good    Recommended Labs or Other Treatments After Discharge: follow kidney function. Patient will need to follow up with pulmonology outpatient. Physician Certification: I certify the above information and transfer of Nicci Martinez  is necessary for the continuing treatment of the diagnosis listed and that he requires [facility] for less 30 days.      Update Admission H&P: Mr. Reece Wilder is a 55-year-old gentleman with history of permanent atrial fibrillation, cardioverted in 2005, 2006 and 2014, currently on Eliquis for anticoagulation, status post loop recorder in situ, CAD, status post PCI/LYN to mid LAD underwent 2009, hypertension, hyperlipidemia, non-Hodgkin's lymphoma, history of syncope, TIA, GERD/Ponce's esophagus and BPH who presented to the hospital with a 6-week history of fatigue and progressively increasing dyspnea. He had a Lexiscan nuclear stress test at [facility] in June 2018 which showed an EF of 55% inferior wall hypokinesis without ischemia. Transthoracic echo in June 2019 showed an EF of 55+/-5% moderate LVH, severe left atrial enlargement, mild MR and TR and moderate aortic regurgitation. He presented to the hospital with progressively increasing dyspnea and fatigue for the past 6 weeks but denies any orthopnea and paroxysmal nocturnal dyspnea. He also noticed bilateral leg edema and no significant weight gain. In fact, he lost 25 pounds over the last 6 months. His EKG showed atrial fibrillation with heart rate in the 90s. He had a CTA chest that was negative for PE but it showed large right and moderate sized left pleural effusion and significant bibasilar infiltrates worse on the right. No evidence of pulmonary embolism. Small mediastinal and paratracheal lymph nodes noted. Lab work showed BUN/creatinine of 36/1.4, electrolytes normal proBNP 4876 troponins less than 0.03×3. Liver function normal.  TSH 1.8, WBC 4.6 H&H 11.4/35.4 platelets 640. Serum iron low at 37 TIBC 185 sats 20% INR 1.2. Urinalysis was positive for large amount of nitrates and leukocyte Estrace WBC more than 20 with a moderate amount of bacteria but patient was asymptomatic and Cx came back positive for <10,000 colonies. Patient was admitted and started on treatment for ADHF. Pulmonology was consulted and pleural effusions were drained. Culture of pleural fluid came back negative, no malignant cells were noted either. Patient continued to improve with diuresis.  GI was also consulted due to complaints of burning in chest, EGD was advised once patient medically stable. PT/OT followed patient and recommended patient try to sit up in chair. Patient is stable to go to a care facility for further care.     PHYSICIAN SIGNATURE:  Electronically signed by Camden Joseph MD on 8/3/20 at 5:23 PM EDT

## 2020-08-03 NOTE — PLAN OF CARE
Problem:  Activity:  Goal: Capacity to carry out activities will improve  Outcome: Met This Shift  Goal: Will verbalize the importance of balancing activity with adequate rest periods  Outcome: Met This Shift     Problem: Cardiac:  Goal: Hemodynamic stability will improve  Outcome: Met This Shift  Goal: Ability to maintain an adequate cardiac output will improve  Outcome: Met This Shift     Problem: Fluid Volume:  Goal: Risk for excess fluid volume will decrease  Outcome: Met This Shift  Goal: Maintenance of adequate hydration will improve  Outcome: Met This Shift  Goal: Will show no signs and symptoms of electrolyte imbalance  Outcome: Met This Shift

## 2020-08-03 NOTE — CARE COORDINATION
Return call received from [facility] with [facility] Request for updated clinics received from the weekend. Updated notes, labs and therapies sent to [phone]. Facility and physician information received for authorization process. [facility]  [address] [facility]  [place]  NPI: [id number]  P:  [phone]  F:  [phone]    Dr Isabel Desouza, Accepting Physician  [address]  P:  [phone]  NPI:  [id number]    Nursing with message out to Pulmonology re: treatment plans. No input from Pulmonology at this time. Will hopefully initiate authorization with [employer] tomorrow for transition to [facility] hopefully Wednesday. Will continue to follow.      Nely Gonzalez.  P:  [phone]

## 2020-08-04 ENCOUNTER — APPOINTMENT (OUTPATIENT)
Dept: GENERAL RADIOLOGY | Age: 85
DRG: 291 | End: 2020-08-04
Payer: MEDICARE

## 2020-08-04 LAB
ALBUMIN SERPL-MCNC: 3.3 G/DL (ref 3.5–5.2)
ALP BLD-CCNC: 73 U/L (ref 40–129)
ALT SERPL-CCNC: 12 U/L (ref 0–40)
ANION GAP SERPL CALCULATED.3IONS-SCNC: 11 MMOL/L (ref 7–16)
AST SERPL-CCNC: 17 U/L (ref 0–39)
BASOPHILS ABSOLUTE: 0.03 E9/L (ref 0–0.2)
BASOPHILS RELATIVE PERCENT: 0.6 % (ref 0–2)
BILIRUB SERPL-MCNC: 0.8 MG/DL (ref 0–1.2)
BUN BLDV-MCNC: 39 MG/DL (ref 8–23)
CALCIUM SERPL-MCNC: 10.1 MG/DL (ref 8.6–10.2)
CHLORIDE BLD-SCNC: 98 MMOL/L (ref 98–107)
CO2: 28 MMOL/L (ref 22–29)
CREAT SERPL-MCNC: 1.3 MG/DL (ref 0.7–1.2)
EOSINOPHILS ABSOLUTE: 0.23 E9/L (ref 0.05–0.5)
EOSINOPHILS RELATIVE PERCENT: 4.7 % (ref 0–6)
GFR AFRICAN AMERICAN: >60
GFR NON-AFRICAN AMERICAN: 52 ML/MIN/1.73
GLUCOSE BLD-MCNC: 89 MG/DL (ref 74–99)
HCT VFR BLD CALC: 36.9 % (ref 37–54)
HEMOGLOBIN: 11.6 G/DL (ref 12.5–16.5)
IMMATURE GRANULOCYTES #: 0.02 E9/L
IMMATURE GRANULOCYTES %: 0.4 % (ref 0–5)
LYMPHOCYTES ABSOLUTE: 1.16 E9/L (ref 1.5–4)
LYMPHOCYTES RELATIVE PERCENT: 23.7 % (ref 20–42)
MAGNESIUM: 2 MG/DL (ref 1.6–2.6)
MCH RBC QN AUTO: 27.6 PG (ref 26–35)
MCHC RBC AUTO-ENTMCNC: 31.4 % (ref 32–34.5)
MCV RBC AUTO: 87.6 FL (ref 80–99.9)
MONOCYTES ABSOLUTE: 0.64 E9/L (ref 0.1–0.95)
MONOCYTES RELATIVE PERCENT: 13.1 % (ref 2–12)
NEUTROPHILS ABSOLUTE: 2.81 E9/L (ref 1.8–7.3)
NEUTROPHILS RELATIVE PERCENT: 57.5 % (ref 43–80)
PDW BLD-RTO: 14.5 FL (ref 11.5–15)
PHOSPHORUS: 3 MG/DL (ref 2.5–4.5)
PLATELET # BLD: 192 E9/L (ref 130–450)
PMV BLD AUTO: 8.8 FL (ref 7–12)
POTASSIUM REFLEX MAGNESIUM: 4.2 MMOL/L (ref 3.5–5)
RBC # BLD: 4.21 E12/L (ref 3.8–5.8)
SODIUM BLD-SCNC: 137 MMOL/L (ref 132–146)
TOTAL PROTEIN: 6.4 G/DL (ref 6.4–8.3)
WBC # BLD: 4.9 E9/L (ref 4.5–11.5)

## 2020-08-04 PROCEDURE — 6370000000 HC RX 637 (ALT 250 FOR IP): Performed by: INTERNAL MEDICINE

## 2020-08-04 PROCEDURE — 85025 COMPLETE CBC W/AUTO DIFF WBC: CPT

## 2020-08-04 PROCEDURE — 6360000002 HC RX W HCPCS: Performed by: INTERNAL MEDICINE

## 2020-08-04 PROCEDURE — 97535 SELF CARE MNGMENT TRAINING: CPT

## 2020-08-04 PROCEDURE — 94640 AIRWAY INHALATION TREATMENT: CPT

## 2020-08-04 PROCEDURE — 6370000000 HC RX 637 (ALT 250 FOR IP): Performed by: STUDENT IN AN ORGANIZED HEALTH CARE EDUCATION/TRAINING PROGRAM

## 2020-08-04 PROCEDURE — 83735 ASSAY OF MAGNESIUM: CPT

## 2020-08-04 PROCEDURE — 94664 DEMO&/EVAL PT USE INHALER: CPT

## 2020-08-04 PROCEDURE — 71045 X-RAY EXAM CHEST 1 VIEW: CPT

## 2020-08-04 PROCEDURE — 97530 THERAPEUTIC ACTIVITIES: CPT

## 2020-08-04 PROCEDURE — 80053 COMPREHEN METABOLIC PANEL: CPT

## 2020-08-04 PROCEDURE — 36415 COLL VENOUS BLD VENIPUNCTURE: CPT

## 2020-08-04 PROCEDURE — 2700000000 HC OXYGEN THERAPY PER DAY

## 2020-08-04 PROCEDURE — 1200000000 HC SEMI PRIVATE

## 2020-08-04 PROCEDURE — 99232 SBSQ HOSP IP/OBS MODERATE 35: CPT | Performed by: INTERNAL MEDICINE

## 2020-08-04 PROCEDURE — 2580000003 HC RX 258: Performed by: STUDENT IN AN ORGANIZED HEALTH CARE EDUCATION/TRAINING PROGRAM

## 2020-08-04 PROCEDURE — 99231 SBSQ HOSP IP/OBS SF/LOW 25: CPT | Performed by: INTERNAL MEDICINE

## 2020-08-04 PROCEDURE — 84100 ASSAY OF PHOSPHORUS: CPT

## 2020-08-04 RX ORDER — ALBUTEROL SULFATE 2.5 MG/3ML
2.5 SOLUTION RESPIRATORY (INHALATION) EVERY 6 HOURS
Status: DISCONTINUED | OUTPATIENT
Start: 2020-08-04 | End: 2020-08-05 | Stop reason: HOSPADM

## 2020-08-04 RX ADMIN — ALBUTEROL SULFATE 2.5 MG: 2.5 SOLUTION RESPIRATORY (INHALATION) at 20:54

## 2020-08-04 RX ADMIN — FOLIC ACID 1 MG: 1 TABLET ORAL at 09:25

## 2020-08-04 RX ADMIN — TAMSULOSIN HYDROCHLORIDE 0.4 MG: 0.4 CAPSULE ORAL at 09:28

## 2020-08-04 RX ADMIN — LIDOCAINE HYDROCHLORIDE: 20 SOLUTION ORAL; TOPICAL at 15:05

## 2020-08-04 RX ADMIN — METOPROLOL SUCCINATE 37.5 MG: 25 TABLET, EXTENDED RELEASE ORAL at 09:24

## 2020-08-04 RX ADMIN — APIXABAN 2.5 MG: 5 TABLET, FILM COATED ORAL at 21:13

## 2020-08-04 RX ADMIN — ASPIRIN 81 MG: 81 TABLET, COATED ORAL at 09:24

## 2020-08-04 RX ADMIN — SUCRALFATE 1 G: 1 TABLET ORAL at 11:26

## 2020-08-04 RX ADMIN — ROSUVASTATIN CALCIUM 5 MG: 5 TABLET, FILM COATED ORAL at 21:11

## 2020-08-04 RX ADMIN — SUCRALFATE 1 G: 1 TABLET ORAL at 18:49

## 2020-08-04 RX ADMIN — PANTOPRAZOLE SODIUM 40 MG: 40 TABLET, DELAYED RELEASE ORAL at 05:32

## 2020-08-04 RX ADMIN — SUCRALFATE 1 G: 1 TABLET ORAL at 05:31

## 2020-08-04 RX ADMIN — ACETAMINOPHEN 650 MG: 325 TABLET, FILM COATED ORAL at 21:12

## 2020-08-04 RX ADMIN — Medication 1000 MCG: at 09:24

## 2020-08-04 RX ADMIN — APIXABAN 2.5 MG: 5 TABLET, FILM COATED ORAL at 09:25

## 2020-08-04 RX ADMIN — ALBUTEROL SULFATE 2.5 MG: 2.5 SOLUTION RESPIRATORY (INHALATION) at 10:56

## 2020-08-04 RX ADMIN — POTASSIUM CHLORIDE 20 MEQ: 1500 TABLET, EXTENDED RELEASE ORAL at 09:24

## 2020-08-04 RX ADMIN — FUROSEMIDE 40 MG: 40 TABLET ORAL at 09:25

## 2020-08-04 RX ADMIN — POTASSIUM CHLORIDE 20 MEQ: 1500 TABLET, EXTENDED RELEASE ORAL at 18:48

## 2020-08-04 RX ADMIN — FUROSEMIDE 40 MG: 40 TABLET ORAL at 18:49

## 2020-08-04 RX ADMIN — SODIUM CHLORIDE, PRESERVATIVE FREE 10 ML: 5 INJECTION INTRAVENOUS at 09:25

## 2020-08-04 RX ADMIN — SODIUM CHLORIDE, PRESERVATIVE FREE 10 ML: 5 INJECTION INTRAVENOUS at 21:14

## 2020-08-04 ASSESSMENT — PAIN DESCRIPTION - PROGRESSION
CLINICAL_PROGRESSION: NOT CHANGED

## 2020-08-04 ASSESSMENT — PAIN DESCRIPTION - FREQUENCY: FREQUENCY: INTERMITTENT

## 2020-08-04 ASSESSMENT — PAIN DESCRIPTION - PAIN TYPE: TYPE: CHRONIC PAIN

## 2020-08-04 ASSESSMENT — PAIN DESCRIPTION - ORIENTATION: ORIENTATION: LEFT

## 2020-08-04 ASSESSMENT — PAIN DESCRIPTION - ONSET: ONSET: SUDDEN

## 2020-08-04 ASSESSMENT — PAIN DESCRIPTION - DESCRIPTORS: DESCRIPTORS: ACHING;DULL;DISCOMFORT

## 2020-08-04 ASSESSMENT — PAIN DESCRIPTION - LOCATION: LOCATION: KNEE

## 2020-08-04 ASSESSMENT — PAIN - FUNCTIONAL ASSESSMENT: PAIN_FUNCTIONAL_ASSESSMENT: PREVENTS OR INTERFERES SOME ACTIVE ACTIVITIES AND ADLS

## 2020-08-04 ASSESSMENT — PAIN SCALES - GENERAL
PAINLEVEL_OUTOF10: 3
PAINLEVEL_OUTOF10: 0
PAINLEVEL_OUTOF10: 4

## 2020-08-04 NOTE — PROGRESS NOTES
Physical Therapy  Treatment Note    Name: Priyanka Haynes  : 1931  MRN: [id number]    Referring Provider:  Belem Brown DO    Date of Service: 2020    Evaluating PT:  Lori De Leon PT, DPT [id number]    Room #:  6528/6742-U  Diagnosis:  Heart Failure  PMHx/PSHx:  Afib, anemia, renal failure, HTN, lymphoma, HLD  Precautions:  Falls  Equipment Needs:  Front [employer] - pt owns    SUBJECTIVE:    Pt lives alone in an apartmetn with 0 stairs to enter and 0 rail. Bed is on first floor and bath is on first floor. Pt ambulated with front [employer] Juanita PTA. Pt reports independence with all ADLs. Pt reports daughter and son in law provide transportation and groceries. OBJECTIVE:   Initial Evaluation  Date: 2020 Treatment  2020 Short Term/ Long Term   Goals   AM-PAC 6 Clicks  99/42    Was pt agreeable to Eval/treatment? yes Yes    Does pt have pain? 10/10 LLE, 10 RLE Chest discomfort with cough    Bed Mobility  Rolling: Jason  Supine to sit: Jason  Sit to supine: Jason  Scooting: Jason Rolling: SBA  Supine to sit: SBA  Sit to supine: SBA  Scooting: SBA  Rolling: Independent  Supine to sit: Independent. Sit to supine: Independent  Scooting: Independent   Transfers Sit to stand: modA (lacked full knee extension)  Stand to sit: modA  Stand pivot: NT Sit to stand: modA partial stand  Stand to sit: Mod A  Stand pivot: Max A using front [employer]    Sit to stand: SBA  Stand to sit: SBA  Stand pivot: SBA front [employer]   Ambulation    side steps towards HOB with front [employer] modA NT 25 feet with front [employer] negotiation: ascended and descended  NT  NA   ROM BUE:  Per OT note  BLE:  BLE lack full knee extension RLE: WFL- Knee extension lacks ~10*  LLE: WFL, except knee ext lacks ~25*    Strength BUE:  Per OT note  BLE:  Grossly 4-/5     Balance Sitting EOB:  SBA  Dynamic Standing:  modA front [employer] Sitting EOB: SBA  Dynamic Standing:  Max A front [employer]  Sitting EOB:  Independent  Dynamic Standing:  Jason front [employer]     Pt is A & O x 4  Sensation:  Pt denies numbness and tingling to extremities  Edema:  unremarkable    Vitals:  SPo2 monitored throughout on 2L NC and maintained 98-99%. Therapeutic Exercises:  LAQ x 20 reps bilaterally    Patient education  Pt educated on role of PT intervention. Pt educated on safety in room with utilization of call light for assistance with mobility. Reviewed proper utilization of front [employer] for transfers. Educated on importance of maximizing OOB time for improved cardiorespiratory function. Patient response to education:   Pt verbalized understanding Pt demonstrated skill Pt requires further education in this area   yes yes yes     ASSESSMENT:    Comments:  RN cleared pt for activity prior to session. Pt received supine in bed and agreeable to PT intervention with OT collaboration at this time. Pt performed all functional mobility as noted above. Pt with poor recall of previous session, stating he does not remember ever getting to the chair with a front [employer]. Pt previously demonstrated ability to achieve full standing position but on this date was unable to and stood with flexed knees and flexed posture. Chair alarm was placed on chair and pt was left sitting in bedside chair with all needs met and call light in reach. Pt would benefit from continued skilled PT intervention to address functional mobility deficits noted above. Treatment:  Patient practiced and was instructed in the following treatment:     Therapeutic Activities Completed:  o Functional mobility as noted above:   - Bed mobility: SBA to complete. Additional time required. Cueing provided for proper use of bed rail.     - Transfer training: Salli Bonifacio to achieve partial standing position with max VC to full extend bilateral knees with pt stating \"I can't\". Max A for stand pivot transfer with front [employer]. Pt abandoned walker during stand pivot transfer despite max VC to the contrary.   Cues for proper hand placement and sequencing throughout.  o Skilled repositioning in seated position for comfort and improved cardiorespiratory function. o Pt education as noted above.  o Skilled vitals monitoring as noted above. PLAN:    Patient is making fair progress towards established goals. Will continue with current POC.       Time in  0810  Time out  0835    Total Treatment Time  25 minutes     CPT codes:  [] Gait training 59827 0 minutes  [] Manual therapy 80157 0 minutes  [x] Therapeutic activities 42626 25 minutes  [] Therapeutic exercises 78923 0 minutes  [] Neuromuscular reeducation 23080 0 minutes    Opal Reeves PT, DPT  [id number]

## 2020-08-04 NOTE — PROGRESS NOTES
Nutrition Assessment     Type and Reason for Visit: Initial    Nutrition Recommendations/Plan: Continue current diet, Start Boost pudding BID     Nutrition Assessment:  Pt nutritionally at risk w/ noted open area to buttock. Noted bilateral pleural effusions w/ fluid-related wt loss since adm (->6L, s/p thoracentesis), PO intake remains adequate. Will provide ONS and monitor    Malnutrition Assessment:  Malnutrition Status: Insufficient data(d/t lack of proper PPE)    Nutrition Related Findings: pt alert, abd WDL, trace edema, -I/Os      Current Nutrition Therapies:    DIET LOW SODIUM 2 GM; 1000 ml    Nutrition Diagnosis:   · Increased nutrient needs related to increase demand for energy/nutrients as evidenced by wounds      Nutrition Interventions:   Food and/or Nutrient Delivery:  Continue Current Diet, Start Oral Nutrition Supplement(Ensure HP BID)  Nutrition Education/Counseling:  Education not indicated   Coordination of Nutrition Care:  Continued Inpatient Monitoring    Goals:  Consume >75% meals/ONS       Nutrition Monitoring and Evaluation:   Food/Nutrient Intake Outcomes:  Food and Nutrient Intake, Supplement Intake  Physical Signs/Symptoms Outcomes:  Biochemical Data, GI Status, Fluid Status or Edema, Nutrition Focused Physical Findings, Skin, Weight     Discharge Planning:     Too soon to determine     Electronically signed by Anthony Jackson MS, RD, LD on 8/4/20 at 11:24 AM EDT    Contact: [phone]

## 2020-08-04 NOTE — PROGRESS NOTES
LB Dressing Maximal Assist   Mod A  Simulated pants   Stand by Assist    Bathing Maximal Assist  Mod A  simulated  Stand by Assist    Toileting Moderate Assist   Mod A  Simulated  Recommend bedside commode Stand by Assist    Bed Mobility  Supine to sit: Minimal Assist   Sit to supine: Minimal Assist  SBA- supine to sit   Increased time & effort to complete task using bed rail    Supine to sit: Modified [employer]   Sit to supine: Modified [employer]    Functional Transfers Sit to stand: Moderate Assist   Stand to sit: Moderate Assist   Stand pivot: Moderate Assist   Mod A   Sit < > stand  Cuing for hand placement and body mechanics Stand by Assist    Functional Mobility  (Ambulation) Mod A with ww  3-4 side steps Max A- stand pivot transfer  Using w/w, pt abandoned walker in middle of transfer  despite max cues SBA with [employer]  Functional distance   Balance Sitting:     Static:  wfl    Dynamic:SBA  Standing: mod A with ww- retro lean Sitting:     Static: Supervision    Dynamic:SBA  Standing: Mod A with ww     Activity Tolerance poor  Fair-  Limited by dizziness which slowly diminished once upright in chair after several minutes  Good-   Visual/  Perceptual Glasses: ?             BUE  ROM/Strength/  Fine motor Coordination RUE: ROM WFL     Strength: grossly 3+/5      Strength: fair     Coordination: fair     LUE: ROM  WFL     Strength: grossly 3+/5      Strength: fair     Coordination: fair  Instructed pt on B UE exercises with Fair understanding Sufficient UE strength for improved indep with functional transfers and mobilty       Comments: Upon arrival pt was in bed & agreeable for therapy. Pt educated on techniques to increase independence and safety during ADL's, bed mobility, and functional transfers. At end of session pt left seated in bedside chair, nursing notified, all lines and tubes intact & call light within reach. Chair alarm set     · Pt has made slow progress towards set goals.    · Continue with current plan of care    Treatment Time In: 8:10          Treatment Time Out: 8:35            Treatment Charges: Mins Units   Ther Ex  03512     Manual Therapy [address] [facility]     Thera Activities 25352 15 1   ADL/Home Mgt 86588 10 1   Neuro Re-ed 92750     Group Therapy      Orthotic manage/training  26594     Non-Billable Time     Total Timed Treatment 25 2       Marni DOUGLASS  [address] [place] [address], [address] [place] [address]

## 2020-08-04 NOTE — PROGRESS NOTES
[facility]  Internal Medicine Residency / [address]    Attending Physician Statement  I have discussed the case, including pertinent history and exam findings with the resident and the team.  I have seen and examined the patient, reviewed meds and pertinent labs and the key elements of the encounter have been performed by me. I agree with the assessment, plan and orders as documented by the resident. PT/OT and dietary assessments noted. Will continue current rx, await any further recommendations from pulmonary. Discharge planning. Remainder of medical problems as per resident note.       Thomas Bull  Internal Medicine Residency Faculty

## 2020-08-04 NOTE — PROGRESS NOTES
[facility]  Internal Medicine Residency Program  Progress Note - House Team 1    Patient:  Brock Sharma 80 y.o. male   MRN: [id number]       Date of Service: 2020    Allergy: Zocor [simvastatin]      Subjective     Patient was seen and examined in AM. Patient was seen by PT this am. He was sitting on the chair comfortably. Precert sent today, plan for possible DC tmrw. 24 hour change: Stable overnight. No acute overnight issues reported. Objective     TEMPERATURE:  Current - Temp: 97.5 °F (36.4 °C); Max - Temp  Av.8 °F (36.6 °C)  Min: 97.2 °F (36.2 °C)  Max: 98.4 °F (36.9 °C)  RESPIRATIONS RANGE: Resp  Av.5  Min: 14  Max: 20  PULSE RANGE: Pulse  Av.2  Min: 74  Max: 104  BLOOD PRESSURE RANGE:  Systolic (74BMA), QJT:810 , Min:98 , KVA:390   ; Diastolic (19ANS), RIP:42, Min:49, Max:67    PULSE OXIMETRY RANGE: SpO2  Av.3 %  Min: 98 %  Max: 100 %    I & O - 24hr:    Intake/Output Summary (Last 24 hours) at 2020 1443  Last data filed at 2020 0900  Gross per 24 hour   Intake 720 ml   Output 975 ml   Net -255 ml     I/O last 3 completed shifts: In: 2209 [P.O.:1220]  Out: 1625 [Urine:1625] I/O this shift:  In: 240 [P.O.:240]  Out: -    Weight change: -1 lb 3 oz (-0.539 kg)    Physical Exam  Vitals signs and nursing note reviewed. HENT:      Head: Normocephalic and atraumatic. Cardiovascular:      Rate and Rhythm: Normal rate and regular rhythm. Heart sounds: Normal heart sounds. Pulmonary:      Effort: Pulmonary effort is normal.      Breath sounds: Normal breath sounds. Abdominal:      Palpations: Abdomen is soft. Skin:     General: Skin is warm. Capillary Refill: Capillary refill takes less than 2 seconds. Neurological:      General: No focal deficit present. Mental Status: He is alert.    Psychiatric:         Mood and Affect: Mood normal.           Labs and Imaging Studies     CBC:   Recent Labs     20  0610 20  7668 20  0615   WBC 5.9 5.0 4.9   HGB 11.4* 11.5* 11.6*   HCT 35.7* 36.7* 36.9*   MCV 86.7 87.6 87.6    194 192       BMP:    Recent Labs     20  0610 20  0642 20    138 137   K 4.7 4.8 4.2   CL 99 101 98   CO2 27 27 28   BUN 40* 41* 39*   CREATININE 1.6* 1.5* 1.3*   GLUCOSE 93 104* 89       LIVER PROFILE:   Recent Labs     20  0610 20  0642 2015   AST 15 16 17   ALT 11 11 12   BILITOT 0.9 0.8 0.8   ALKPHOS 70 72 73       PT/INR:   No results for input(s): PROTIME, INR in the last 72 hours. APTT:   No results for input(s): APTT in the last 72 hours. Fasting Lipid Panel:    No results found for: CHOL, TRIG, HDL    Notable Cultures:      Blood cultures No results found for: BC  Respiratory cultures No results found for: RESPCULTURE   Gram Stain Result   Date Value Ref Range Status   2020 Refer to ordered Gram stain for results  Final     Urine   Urine Culture, Routine   Date Value Ref Range Status   2020   Final    <10,000 CFU/mL  Gram negative rods  Mixed gram positive organisms       Legionella No results found for: LABLEGI  C Diff PCR No results found for: CDIFPCR  Wound culture/abscess: No results for input(s): WNDABS in the last 72 hours. Tip culture:No results for input(s): CXCATHTIP in the last 72 hours. Xr Chest (2 Vw)    Result Date: 2020  Patient MRN: [id number] : 1931 Age:  80 years Gender: Male Order Date: 2020 9:27 AM Exam: XR CHEST (2 VW) Number of Images: 3 views Indication:   f/u chf/effusion f/u chf/effusion Comparison: 2020 Findings: There is a suggestion of bilateral pleural effusions The heart is enlarged. The lung fields demonstrate evidence for airspace disease. Infiltrate is seen at both lung bases as well as to lesser extent a nodular infiltrate in the left upper lung The aorta is tortuous and ectatic.      Tortuous ectatic aorta Cardiomegaly Airspace disease compatible with atelectasis or pneumonia, at the lung bases superimposed on moderate bilateral pleural effusions The chest does not appear to be significantly changed in the interval     Xr Knee Left (3 Views)    Result Date: 2020  4 views of the left knee are obtained. There is tricompartmental loss of joint space, osteophytosis and eburnation. There is mild diffuse bone demineralization. Arterial calcifications are present. Otherwise the regional soft tissue and osseous structures are unremarkable. No acute fracture is identified. Osteoarthritis. Osteopenia. Xr Knee Left (3 Views)    Result Date: 2020  Patient MRN:  [id number] : 1931 Age: 80 years Gender: Male Order Date:  2020 8:30 AM EXAM: XR KNEE LEFT (3 VIEWS) INDICATION:  knEE pAIN knEE pAIN COMPARISON: None FINDINGS:  There is mild compartmental narrowing medially and small medially oriented spurs. There is mild degenerative changes patellofemoral compartment. There are no fractures. There is no joint effusion. Mild degenerative changes     Ct Abdomen Pelvis W Iv Contrast Additional Contrast? None    Result Date: 2020  Patient MRN:  [id number] : 1931 Age: 80 years Gender: Male Order Date:  2020 9:00 AM EXAM: CTA CHEST W CONTRAST, CT ABDOMEN PELVIS W IV CONTRAST Dosage: 2009 mGY-cm Contrast: 110 mL Isovue-370 3-D postprocessing performed INDICATION:  Pain Pain COMPARISON: None FINDINGS:  There are small mediastinal and paratracheal lymph nodes. There are small left hilar and right infrahilar nodes. There is a moderate amount of calcified plaque in the great vessels. There is a moderate amount of coronary arterial vascular plaque. There is moderate to large right and moderate left pleural effusions. There is no evidence of pulmonary emboli. There are some groundglass opacities in left upper lobe. There is moderate bibasilar infiltrates. There is some groundglass opacities in the right upper lobe.  Liver is normal. There is a tiny peripheral cyst. There are gallstones. Spleen and pancreas are normal. There is a 3.1 cm lesion posteriorly left kidney with solid and cystic components. There is moderate atheromatous plaque in the abdominal aorta and iliac vessels. No abdominal or pelvic lymphadenopathy or fluid collections are noted. There may be some debris within the bladder. There is slight retrolisthesis of L2 with respect to L3 with degenerative changes. Large right and moderate sized left pleural effusion  and significant bibasilar infiltrates worse on the right. Findings are nonspecific but could reflect congestive heart failure. No evidence of pulmonary emboli. Some paratracheal and small mediastinal lymph nodes. Lesion posteriorly in the left kidney. Although this could reflect a hemorrhagic cyst, a complex solid and cystic mass is not excluded. Recommend follow-up ultrasound. Low-attenuation zones in the bladder which could reflect debris. This can be further evaluated with ultrasound as well    Xr Chest Portable    Result Date: 2020  Patient MRN: [id number] : 1931 Age:  80 years Gender: Male Order Date: 2020 10:14 AM Exam: XR CHEST PORTABLE Number of Images: 1 view Indication:   Pleural effusions and fluid overload Pleural effusions and fluid overload Comparison: 2020 portable chest radiograph Findings: Persistent, small bilateral pleural effusions with associated atelectasis and consolidation. The heart is enlarged The aorta is tortuous and ectatic     Persistent, small bilateral pleural effusions with associated atelectasis and consolidation. This study was dictated by Chuyita Blackman PA-C and Frankie Campos MD reviewed and concurred with the findings. Xr Chest Portable    Result Date: 2020  Patient MRN:  [id number] : 1931 Age: 80 years Gender: Male Order Date:  2020 10:43 PM TECHNIQUE/NUMBER OF IMAGES/COMPARISON/CLINICAL HISTORY: Chest AP 1 image one view Comparison was first performed same day early. History pneumonia. FINDINGS: Persistent mild to moderate bilateral effusion. A mild degree of cardiomegaly is present. There is no perihilar vascular congestion. Persistent mild to moderate bilateral pleural effusions. Xr Chest Portable    Result Date: 2020  Patient MRN:  [id number] : 1931 Age: 80 years Gender: Male Order Date:  2020 2:30 PM EXAM: XR CHEST PORTABLE INDICATION:  post thoracentesis post thoracentesis COMPARISON: 2020 FINDINGS:  There has been mild decrease in the right pleural effusion since the prior study. Pleural thickening/fluid left costophrenic angle is also suspected. Heart size is borderline enlarged. There are no acute infiltrates. Mild decrease in right-sided pleural effusion since the prior study     Xr Chest Portable    Result Date: 2020  Patient MRN:  [id number] : 1931 Age: 80 years Gender: Male Order Date:  2020 9:30 AM TECHNIQUE/NUMBER OF IMAGES/COMPARISON/CLINICAL HISTORY: Chest AP 1 image and view Comparison  Congestive heart failure. FINDINGS: Presence of a mild to moderate right-sided pleural effusion. Presence of a small left-sided pleural effusion. Heart is enlarged. CTR: 19.1/35 cm there are. There is no perihilar vascular congestion. There is a cardiac monitor device in the left anterior chest wall. Cardiomegaly with bilateral perfusions, mild to moderate on the right, small on the left. No perihilar vascular congestion. Xr Chest Portable    Result Date: 2020  Patient MRN:  [id number] : 1931 Age: 80 years Gender: Male Order Date:  2020 8:30 AM EXAM: XR CHEST PORTABLE INDICATION:  sob sob COMPARISON: None FINDINGS:  Heart is mildly enlarged. There are moderate-sized bilateral pleural effusions. They are larger on the right. There are no obvious infiltrates.     CHF     Cta Chest W Contrast    Result Date: 2020  Patient MRN:  [id number] : 1931 Age: 80 years Gender: Male Order Date:  2020 9:00 AM EXAM: CTA CHEST W CONTRAST, CT ABDOMEN PELVIS W IV CONTRAST Dosage: 2009 mGY-cm Contrast: 110 mL Isovue-370 3-D postprocessing performed INDICATION:  Pain Pain COMPARISON: None FINDINGS:  There are small mediastinal and paratracheal lymph nodes. There are small left hilar and right infrahilar nodes. There is a moderate amount of calcified plaque in the great vessels. There is a moderate amount of coronary arterial vascular plaque. There is moderate to large right and moderate left pleural effusions. There is no evidence of pulmonary emboli. There are some groundglass opacities in left upper lobe. There is moderate bibasilar infiltrates. There is some groundglass opacities in the right upper lobe. Liver is normal. There is a tiny peripheral cyst. There are gallstones. Spleen and pancreas are normal. There is a 3.1 cm lesion posteriorly left kidney with solid and cystic components. There is moderate atheromatous plaque in the abdominal aorta and iliac vessels. No abdominal or pelvic lymphadenopathy or fluid collections are noted. There may be some debris within the bladder. There is slight retrolisthesis of L2 with respect to L3 with degenerative changes. Large right and moderate sized left pleural effusion  and significant bibasilar infiltrates worse on the right. Findings are nonspecific but could reflect congestive heart failure. No evidence of pulmonary emboli. Some paratracheal and small mediastinal lymph nodes. Lesion posteriorly in the left kidney. Although this could reflect a hemorrhagic cyst, a complex solid and cystic mass is not excluded. Recommend follow-up ultrasound. Low-attenuation zones in the bladder which could reflect debris.  This can be further evaluated with ultrasound as well    Us Thoracentesis    Result Date: 2020  Patient MRN: [id number] : 1931 Age:  80 years Gender: Male Order Date: 2020 12:00 AM Exam: [address] Number of Images: 7 views Indication: Pleural Effusion Call Dr Noble Villaseñor at [address] [facility] for thoracentesis at 1.30 pm Pleural Effusion Comparison: None. Procedure performed by Dr. Noble Villaseñor. Submitted ultrasound images reveal fluid in the right pleural space. A total of 900 cc of serosanguineous colored fluid was removed. Ultrasound-guided thoracentesis for a right pleural effusion. The exam has been dictated and signed by [place] wilder. Ford Boast, APRN-CNP and Camille Hayes MD, reviewed and concurred with these findings. Us Dup Lower Extremities Bilateral Venous    Result Date: 2020  Patient MRN:  [id number] : 1931 Age: 80 years Gender: Male Order Date:  2020 6:30 PM EXAM: US DUP LOWER EXTREMITIES BILATERAL VENOUS COMPARISON: None INDICATION:  swelling, calf pain- rule out DVT swelling, calf pain- rule out DVT FINDINGS: Normal compression and augmentation is seen. No evidence to suggest deep venous thrombosis. No additional sonographic abnormalities. No evidence to suggest deep venous thrombosis of the bilateral lower extremities. Xr Hip 2-3 Vw W Pelvis Left    Result Date: 2020  3 views of the pelvis and left hip are obtained. These images reveal no evidence for acute displaced cortical disruption or dislocation. There is osteophytosis and eburnation of the sacroiliac and acetabular articulating surfaces. Degenerative spondylosis and facet arthropathy are identified in the lower lumbar spine. Otherwise the regional soft tissue and osseous structures are unremarkable. No acute fracture is identified.         Medications     Current Meds:  Current Facility-Administered Medications   Medication Dose Route Frequency Provider Last Rate Last Dose    aluminum & magnesium hydroxide-simethicone (MAALOX) 30 mL, lidocaine viscous hcl (XYLOCAINE) 5 mL (GI COCKTAIL)   Oral Once Jose Huang MD        albuterol (PROVENTIL) nebulizer solution 2.5 mg  2.5 mg Nebulization Q6H Jose Huang MD   2.5 mg at 20 8922    furosemide (LASIX) tablet 40 mg  40 mg Oral BID Clau Bonilla MD   40 mg at 08/04/20 0925    diclofenac sodium (VOLTAREN) 1 % gel 4 g  4 g Topical 4x Daily PRN Stevan Barboza MD   4 g at 08/03/20 1213    apixaban (ELIQUIS) tablet 2.5 mg  2.5 mg Oral BID Keila Apodaca MD   2.5 mg at 08/04/20 0925    sucralfate (CARAFATE) tablet 1 g  1 g Oral 4 times per day Gerard Long MD   1 g at 08/04/20 1126    metoprolol succinate (TOPROL XL) extended release tablet 37.5 mg  37.5 mg Oral BID Keila Apodaca MD   37.5 mg at 08/04/20 3396    perflutren lipid microspheres (DEFINITY) injection 1.65 mg  1.5 mL Intravenous ONCE PRN Keila Apodaca MD        potassium chloride (KLOR-CON M) extended release tablet 20 mEq  20 mEq Oral BID WC Keila Apodaca MD   20 mEq at 08/04/20 0924    sodium chloride flush 0.9 % injection 10 mL  10 mL Intravenous PRN Santa Aguilera II, MD   10 mL at 07/28/20 1135    aspirin EC tablet 81 mg  81 mg Oral Daily Nevaeh Gonzalez MD   81 mg at 08/04/20 0924    pantoprazole (PROTONIX) tablet 40 mg  40 mg Oral QAM AC Nevaeh Gonzalez MD   40 mg at 53/35/84 0451    folic acid (FOLVITE) tablet 1 mg  1 mg Oral Daily Nevaeh Gonzalez MD   1 mg at 08/04/20 0925    rosuvastatin (CRESTOR) tablet 5 mg  5 mg Oral Nightly Nevaeh Gonzalez MD   5 mg at 08/03/20 2135    tamsulosin (FLOMAX) capsule 0.4 mg  0.4 mg Oral Daily Nevaeh Gonzalez MD   0.4 mg at 08/04/20 2047    vitamin B-12 (CYANOCOBALAMIN) tablet 1,000 mcg  1,000 mcg Oral Daily Nevaeh Gonzalez MD   1,000 mcg at 08/04/20 9282    sodium chloride flush 0.9 % injection 10 mL  10 mL Intravenous 2 times per day Nevaeh Gonzalez MD   10 mL at 08/04/20 0925    acetaminophen (TYLENOL) tablet 650 mg  650 mg Oral Q6H PRN Nevaeh Gonzalez MD        Or   Kathy Ocampo acetaminophen (TYLENOL) suppository 650 mg  650 mg Rectal Q6H PRN Nevaeh Gonzalez MD        polyethylene glycol (GLYCOLAX) packet 17 g  17 g Oral Daily PRN MD Kathy Brock promethazine (PHENERGAN) tablet 12.5 mg  12.5 mg Oral Q6H PRN Travis Sicard, MD        Or    ondansetron [facility]) injection 4 mg  4 mg Intravenous Q6H PRN Travis Sicard, MD           Continuous Infusions:  Scheduled Meds:   GI cocktail   Oral Once    albuterol  2.5 mg Nebulization Q6H    furosemide  40 mg Oral BID    apixaban  2.5 mg Oral BID    sucralfate  1 g Oral 4 times per day    metoprolol succinate  37.5 mg Oral BID    potassium chloride  20 mEq Oral BID WC    aspirin  81 mg Oral Daily    pantoprazole  40 mg Oral QAM AC    folic acid  1 mg Oral Daily    rosuvastatin  5 mg Oral Nightly    tamsulosin  0.4 mg Oral Daily    vitamin B-12  1,000 mcg Oral Daily    sodium chloride flush  10 mL Intravenous 2 times per day     PRN Meds: diclofenac sodium, perflutren lipid microspheres, sodium chloride flush, acetaminophen **OR** acetaminophen, polyethylene glycol, promethazine **OR** ondansetron      Resident's Assessment and Plan      Kerry Lines is 80 y.o. male    1. ADHF, 2/2 fluid over load and b/l pleural effusions - On lasix BID. On baseline O2 at this time. Resolution of LE edema. 2. A.fib, rate controlled on toporol XL BID. On Eliquis. 3. B/l pleural effusions, cytology, gram stain, Cx negative for malignancy and infectious causes at this time. Follow repeat CXR this AM. Given Hx of lymphoma and enlarged lymph nodes concern for malignancy. 4. ASHWIN on CKD stage III, Cr trending down from1.6 to 1.3. Making appropriate urine. Continue diuresis. 5. Asymptomatic bacteruria  6. Normocytic anemia, Hb 12.6, stable  7. GERD, complains of burning substernal sensation. On Protonix, received GI cocktail as well. Outpatient evaluation and EGD for further evaluation   8. HLD, rosuvastatin 5 mg  9.  Hx of lymphoma      Diet: Cardiac  Peptic ulcer prophylaxis: Protonix  DVT Prophylaxis: Eliquis    Disposition: continue current care    Kathryne Lennox, MD PGY-2   Internal medicine resident  Attending Physician: Dr. Lyla Avery

## 2020-08-04 NOTE — PROGRESS NOTES
Pulmonary [facility]                             Pulmonary Consult/Progress Note :              Consulting Physician:Bilateral Pleural effusion       Reason for Consultation:bilateral effusion   CC : SOB   HPI:   Seems doing better but has stomach pain   Less cough  . mild sob . on 3-4  L   Fluid exudate ,so could be para pneumonic    On Eliquis   No chest pain       PHYSICAL EXAMINATION:     VITAL SIGNS:  BP (!) 110/57   Pulse 87   Temp 98.4 °F (36.9 °C) (Temporal)   Resp 16   Ht 6' (1.829 m)   Wt 195 lb 7 oz (88.6 kg)   SpO2 99%   BMI 26.51 kg/m²   Wt Readings from Last 3 Encounters:   08/03/20 195 lb 7 oz (88.6 kg)   10/31/19 220 lb (99.8 kg)   10/14/19 224 lb (101.6 kg)     Temp Readings from Last 3 Encounters:   08/03/20 98.4 °F (36.9 °C) (Temporal)     TMAX:  BP Readings from Last 3 Encounters:   08/03/20 (!) 110/57   10/31/19 118/60   10/14/19 120/68     Pulse Readings from Last 3 Encounters:   08/03/20 87   10/31/19 78   10/14/19 100           INTAKE/OUTPUTS:  I/O last 3 completed shifts:   In: 3525 [P.O.:1220]  Out: 8401 [Urine:1675]    Intake/Output Summary (Last 24 hours) at 8/4/2020 0031  Last data filed at 8/3/2020 2000  Gross per 24 hour   Intake 1220 ml   Output 1525 ml   Net -305 ml       General Appearance: alert and oriented to person, place and time, well-developed and   well-nourished, in no acute distress   Eyes: pupils equal, round, and reactive to light, extraocular eye movements intact, conjunctivae normal and sclera anicteric   Neck: neck supple and non tender without mass, no thyromegaly, no thyroid nodules and no cervical adenopathy   Pulmonary/Chest:decrease braeth sound bilateral    Cardiovascular: normal rate, regular rhythm, normal S1 and S2, no murmurs, rubs, clicks or gallops, distal pulses intact, no carotid bruits, no murmurs, no gallops, no carotid bruits and no JVD   Abdomen: obese, soft, non-tender, non-distended, normal bowel sounds, no masses or organomegaly   Extremities no edema   Musculoskeletal: normal range of motion, no joint swelling, deformity or tenderness   Neurologic: reflexes normal and symmetric, no cranial nerve deficit noted    LABS/IMAGING:    CBC:  Lab Results   Component Value Date    WBC 5.0 08/03/2020    HGB 11.5 (L) 08/03/2020    HCT 36.7 (L) 08/03/2020    MCV 87.6 08/03/2020     08/03/2020    LYMPHOPCT 19.8 (L) 08/03/2020    RBC 4.19 08/03/2020    MCH 27.4 08/03/2020    MCHC 31.3 (L) 08/03/2020    RDW 14.6 08/03/2020    NEUTOPHILPCT 60.4 08/03/2020    MONOPCT 13.4 (H) 08/03/2020    BASOPCT 0.4 08/03/2020    NEUTROABS 3.01 08/03/2020    LYMPHSABS 0.99 (L) 08/03/2020    MONOSABS 0.67 08/03/2020    EOSABS 0.27 08/03/2020    BASOSABS 0.02 08/03/2020       Recent Labs     08/03/20  0642 08/02/20  0610 08/01/20  0511   WBC 5.0 5.9 6.1   HGB 11.5* 11.4* 11.8*   HCT 36.7* 35.7* 37.2   MCV 87.6 86.7 87.5    198 185       BMP:   Recent Labs     08/01/20  0511 08/02/20  0610 08/03/20  0642    136 138   K 4.5 4.7 4.8   CL 99 99 101   CO2 23 27 27   BUN 42* 40* 41*   CREATININE 1.5* 1.6* 1.5*       MG: No results found for: MG  Ca/Phos:   Lab Results   Component Value Date    CALCIUM 9.9 08/03/2020     Amylase: No results found for: AMYLASE  Lipase: No results found for: LIPASE  LIVER PROFILE:   Recent Labs     08/01/20  0511 08/02/20  0610 08/03/20  0642   AST 15 15 16   ALT 11 11 11   BILITOT 0.8 0.9 0.8   ALKPHOS 68 70 72       PT/INR:   No results for input(s): PROTIME, INR in the last 72 hours. APTT:   No results for input(s): APTT in the last 72 hours.     Cardiac Enzymes:  Lab Results   Component Value Date    CKMB 2.1 07/28/2020    TROPONINI 0.01 07/28/2020                   PROBLEM LIST:  Patient Active Problem List   Diagnosis    Atrial fibrillation ([facility]    Anemia    Lymphoma ([facility]    Hyperlipidemia    Hypertension    CAD (coronary artery disease)    Presence of drug coated stent in LAD coronary artery    Obesity (BMI 30.0-34. 9)    Congestive heart failure (HCC)    Acute diastolic heart failure with preserved ejection fraction (HCC)    Bilateral pleural effusion    Stage 3 chronic kidney disease (HCC)    Hemoptysis               ASSESSMENT:  1.) Bilateral  pleural effusion Right more than left   2.)Medistinal adenopathy   3. )CHF   4.)Possible COPD   5. )VESTA/      PLAN:  Pending  sputum culture,pennding . procal still low   Get CXR   Repeat Procalcitonin ok   Fluid exudate ,could be related to diuresis ,still will follow closely and  may consider EBUS as OP     *-S/P Thoracentesis ,.developed chest pain ,could not finish his an drain all fluid I am concern he already developed entrapped lung ,if to be done will do pigtail drain slowly ,I will review CXR in am and decide ,he is negative 6 L     Negative  cytology/flow as fluid exudate   Need aggressive diuresis   *-pending  flow cytometry  *-diuresis per primary  *-BD   *_NIMV if in any distress          Dmitry Murguia MD,[facility]  Pulmonary&Critical Care Medicine   Director of [address] [place] [address] Director of [address] [place] [address]    Bethanie Desai    NOTE: This report was transcribed using voice recognition software. Every effort was made to ensure accuracy; however, inadvertent computerized transcription errors may be present.

## 2020-08-05 VITALS
SYSTOLIC BLOOD PRESSURE: 105 MMHG | HEIGHT: 72 IN | HEART RATE: 101 BPM | BODY MASS INDEX: 26.44 KG/M2 | DIASTOLIC BLOOD PRESSURE: 55 MMHG | OXYGEN SATURATION: 99 % | TEMPERATURE: 98.4 F | RESPIRATION RATE: 18 BRPM | WEIGHT: 195.2 LBS

## 2020-08-05 LAB
ALBUMIN SERPL-MCNC: 3.4 G/DL (ref 3.5–5.2)
ALP BLD-CCNC: 76 U/L (ref 40–129)
ALT SERPL-CCNC: 11 U/L (ref 0–40)
ANION GAP SERPL CALCULATED.3IONS-SCNC: 13 MMOL/L (ref 7–16)
AST SERPL-CCNC: 16 U/L (ref 0–39)
BASOPHILS ABSOLUTE: 0.01 E9/L (ref 0–0.2)
BASOPHILS RELATIVE PERCENT: 0.2 % (ref 0–2)
BILIRUB SERPL-MCNC: 0.7 MG/DL (ref 0–1.2)
BUN BLDV-MCNC: 43 MG/DL (ref 8–23)
CALCIUM SERPL-MCNC: 10.4 MG/DL (ref 8.6–10.2)
CHLORIDE BLD-SCNC: 99 MMOL/L (ref 98–107)
CO2: 28 MMOL/L (ref 22–29)
CREAT SERPL-MCNC: 1.5 MG/DL (ref 0.7–1.2)
EOSINOPHILS ABSOLUTE: 0.26 E9/L (ref 0.05–0.5)
EOSINOPHILS RELATIVE PERCENT: 5.6 % (ref 0–6)
GFR AFRICAN AMERICAN: 53
GFR NON-AFRICAN AMERICAN: 44 ML/MIN/1.73
GLUCOSE BLD-MCNC: 102 MG/DL (ref 74–99)
HCT VFR BLD CALC: 36.1 % (ref 37–54)
HEMOGLOBIN: 11.4 G/DL (ref 12.5–16.5)
IMMATURE GRANULOCYTES #: 0.02 E9/L
IMMATURE GRANULOCYTES %: 0.4 % (ref 0–5)
LYMPHOCYTES ABSOLUTE: 0.96 E9/L (ref 1.5–4)
LYMPHOCYTES RELATIVE PERCENT: 20.6 % (ref 20–42)
Lab: NORMAL
MAGNESIUM: 2.1 MG/DL (ref 1.6–2.6)
MCH RBC QN AUTO: 27.4 PG (ref 26–35)
MCHC RBC AUTO-ENTMCNC: 31.6 % (ref 32–34.5)
MCV RBC AUTO: 86.8 FL (ref 80–99.9)
MONOCYTES ABSOLUTE: 0.7 E9/L (ref 0.1–0.95)
MONOCYTES RELATIVE PERCENT: 15 % (ref 2–12)
NEUTROPHILS ABSOLUTE: 2.71 E9/L (ref 1.8–7.3)
NEUTROPHILS RELATIVE PERCENT: 58.2 % (ref 43–80)
PDW BLD-RTO: 14.5 FL (ref 11.5–15)
PHOSPHORUS: 2.9 MG/DL (ref 2.5–4.5)
PLATELET # BLD: 218 E9/L (ref 130–450)
PMV BLD AUTO: 9.3 FL (ref 7–12)
POTASSIUM REFLEX MAGNESIUM: 4.7 MMOL/L (ref 3.5–5)
RBC # BLD: 4.16 E12/L (ref 3.8–5.8)
REPORT: NORMAL
SODIUM BLD-SCNC: 140 MMOL/L (ref 132–146)
THIS TEST SENT TO: NORMAL
TOTAL PROTEIN: 6.2 G/DL (ref 6.4–8.3)
WBC # BLD: 4.7 E9/L (ref 4.5–11.5)

## 2020-08-05 PROCEDURE — 6370000000 HC RX 637 (ALT 250 FOR IP): Performed by: INTERNAL MEDICINE

## 2020-08-05 PROCEDURE — 80053 COMPREHEN METABOLIC PANEL: CPT

## 2020-08-05 PROCEDURE — 94667 MNPJ CHEST WALL 1ST: CPT

## 2020-08-05 PROCEDURE — 99238 HOSP IP/OBS DSCHRG MGMT 30/<: CPT | Performed by: INTERNAL MEDICINE

## 2020-08-05 PROCEDURE — 85025 COMPLETE CBC W/AUTO DIFF WBC: CPT

## 2020-08-05 PROCEDURE — 6370000000 HC RX 637 (ALT 250 FOR IP): Performed by: STUDENT IN AN ORGANIZED HEALTH CARE EDUCATION/TRAINING PROGRAM

## 2020-08-05 PROCEDURE — 84100 ASSAY OF PHOSPHORUS: CPT

## 2020-08-05 PROCEDURE — 83735 ASSAY OF MAGNESIUM: CPT

## 2020-08-05 PROCEDURE — 2700000000 HC OXYGEN THERAPY PER DAY

## 2020-08-05 PROCEDURE — 6360000002 HC RX W HCPCS: Performed by: INTERNAL MEDICINE

## 2020-08-05 PROCEDURE — 94640 AIRWAY INHALATION TREATMENT: CPT

## 2020-08-05 PROCEDURE — 2580000003 HC RX 258: Performed by: STUDENT IN AN ORGANIZED HEALTH CARE EDUCATION/TRAINING PROGRAM

## 2020-08-05 PROCEDURE — 36415 COLL VENOUS BLD VENIPUNCTURE: CPT

## 2020-08-05 RX ORDER — METOPROLOL SUCCINATE 25 MG/1
37.5 TABLET, EXTENDED RELEASE ORAL 2 TIMES DAILY
Qty: 30 TABLET | Refills: 1 | Status: SHIPPED | OUTPATIENT
Start: 2020-08-05

## 2020-08-05 RX ORDER — ROSUVASTATIN CALCIUM 5 MG/1
5 TABLET, COATED ORAL NIGHTLY
Qty: 30 TABLET | Refills: 1 | Status: SHIPPED | OUTPATIENT
Start: 2020-08-05

## 2020-08-05 RX ORDER — SUCRALFATE 1 G/1
1 TABLET ORAL 4 TIMES DAILY
Qty: 120 TABLET | Refills: 1 | Status: SHIPPED | OUTPATIENT
Start: 2020-08-05

## 2020-08-05 RX ORDER — POTASSIUM CHLORIDE 20 MEQ/1
20 TABLET, EXTENDED RELEASE ORAL 2 TIMES DAILY WITH MEALS
Qty: 60 TABLET | Refills: 3 | Status: SHIPPED | OUTPATIENT
Start: 2020-08-05

## 2020-08-05 RX ORDER — FUROSEMIDE 40 MG/1
40 TABLET ORAL DAILY
Qty: 60 TABLET | Refills: 1 | Status: SHIPPED | OUTPATIENT
Start: 2020-08-05

## 2020-08-05 RX ADMIN — FOLIC ACID 1 MG: 1 TABLET ORAL at 09:26

## 2020-08-05 RX ADMIN — TAMSULOSIN HYDROCHLORIDE 0.4 MG: 0.4 CAPSULE ORAL at 09:26

## 2020-08-05 RX ADMIN — ALBUTEROL SULFATE 2.5 MG: 2.5 SOLUTION RESPIRATORY (INHALATION) at 09:34

## 2020-08-05 RX ADMIN — Medication 1000 MCG: at 09:26

## 2020-08-05 RX ADMIN — POTASSIUM CHLORIDE 20 MEQ: 1500 TABLET, EXTENDED RELEASE ORAL at 09:26

## 2020-08-05 RX ADMIN — SUCRALFATE 1 G: 1 TABLET ORAL at 06:18

## 2020-08-05 RX ADMIN — APIXABAN 2.5 MG: 5 TABLET, FILM COATED ORAL at 09:27

## 2020-08-05 RX ADMIN — SUCRALFATE 1 G: 1 TABLET ORAL at 00:36

## 2020-08-05 RX ADMIN — SODIUM CHLORIDE, PRESERVATIVE FREE 10 ML: 5 INJECTION INTRAVENOUS at 09:27

## 2020-08-05 RX ADMIN — ALBUTEROL SULFATE 2.5 MG: 2.5 SOLUTION RESPIRATORY (INHALATION) at 14:09

## 2020-08-05 RX ADMIN — PANTOPRAZOLE SODIUM 40 MG: 40 TABLET, DELAYED RELEASE ORAL at 06:18

## 2020-08-05 RX ADMIN — SUCRALFATE 1 G: 1 TABLET ORAL at 11:28

## 2020-08-05 RX ADMIN — FUROSEMIDE 40 MG: 40 TABLET ORAL at 09:26

## 2020-08-05 RX ADMIN — METOPROLOL SUCCINATE 37.5 MG: 25 TABLET, EXTENDED RELEASE ORAL at 09:27

## 2020-08-05 RX ADMIN — ASPIRIN 81 MG: 81 TABLET, COATED ORAL at 09:26

## 2020-08-05 ASSESSMENT — PAIN DESCRIPTION - PAIN TYPE: TYPE: CHRONIC PAIN

## 2020-08-05 ASSESSMENT — PAIN DESCRIPTION - PROGRESSION: CLINICAL_PROGRESSION: NOT CHANGED

## 2020-08-05 ASSESSMENT — PAIN SCALES - GENERAL
PAINLEVEL_OUTOF10: 3
PAINLEVEL_OUTOF10: 2
PAINLEVEL_OUTOF10: 0
PAINLEVEL_OUTOF10: 0

## 2020-08-05 ASSESSMENT — PAIN DESCRIPTION - LOCATION: LOCATION: KNEE

## 2020-08-05 ASSESSMENT — PAIN DESCRIPTION - ORIENTATION: ORIENTATION: LEFT

## 2020-08-05 NOTE — DISCHARGE SUMMARY
[address]  Discharge Summary    PCP: Jacy Serrano MD    Admit Date:7/27/2020  Discharge Date: 8/5/2020    Attending Physician: Dr. Cathi Bell DO    Admission Diagnosis:   1. Acute on chronic diastolic heart failure with bilateral pleural effusions  2. Atrial fibrillation with RVR  3. Hemoptysis, possibly secondary to pulmonary edema  4. Acute cystitis, history of BPH  5. Melanotic stools  6. CKD stage III  7. Normocytic anemia    Discharge Diagnosis:  1. Acute diastolic heart failure with bilateral pleural effusions, improved  2. Atrial fibrillation, rate controlled  3. ASHWIN on CKD stage III, resolved  4. Asymptomatic bacteruria  5. Normocytic anemia  6. GERD  7. HLD  8. Hx of lymphoma    Hospital Course: Shantel Arredondo a 79 y/o male with PHMx of A fib, HTN, HLD, anemia, and lymphoma who presented with SOB, bilateral LE edema, and productive cough. CTA chest significant for bilateral pleural effusion, no evidence of PE. Troponin negative, pro-BNP elevated at 4,687. He was treated as acute CHF exacerbation and started on IV Lasix. Echo showed normal left ventricle size and systolic function with an EF of 60-65% and a mildly dilated left atrium. His Eliquis was initially held due to hemoptysis. US duplex of the bilateral lower extremities showed no evidence of DVT. He underwent right thoracentesis 7/30 with removal of 900 mL of serosanguinous fluid. Fluid analysis showed exudate and fluid studies are pending. CXR showed slight improvement in the right pleural effusion. He was transitioned from IV to PO Lasix while maintaining good output. He went into A fib with RVR one morning and he was given Digoxin. We also switched his home Diltiazem to Toprol XL. His A fib remained under good control throughout the rest of admission. UA was positive for UTI so he was started on empiric Ceftriaxone.  He remained asymptomatic and urine culture grew <10,000 CFU so Ceftrixone was stopped. Gastroenterology was consulted for a reported history of melena. They felt he was not having bloody stools due to his stable hematocrit but recommended an EGD in the near future for evaluation. His hemoglobin remained stable throughout admission. He also complained of worsening chronic left knee pain and X-ray revealed osteoarthritis. He was discharged to a [facility] in stable condition. Significant findings (history and exam, laboratory, radiological, pathology, other tests):   · General Appearance: alert, appears stated age, cooperative and no distress  · HEENT:  Head: Normocephalic, no lesions, without obvious abnormality. · Neck: no adenopathy, no JVD, supple, symmetrical, trachea midline and thyroid not enlarged, symmetric, no tenderness/mass/nodules  · Lung: diminished breath sounds bibasilar and otherwise clear to auscultation. · Heart: regular rate and rhythm, S1, S2 normal, no murmur, click, rub or gallop  · Abdomen: soft, non-tender; bowel sounds normal; no masses,  no organomegaly  · Extremities:  extremities normal, atraumatic, no cyanosis or edema  · Musculokeletal: No joint swelling. Decreased range of motion in the left knee secondary to pain. Normal range of motion in the left hip and right lower extremity. No calf tenderness. · Neurologic: Mental status: Alert, oriented, thought content appropriate    CTA Chest with contrast/CT Abdomen pelvis with IV contrast 7/27/2020:  Large right and moderate sized left pleural effusion  and significant bibasilar infiltrates worse on the right. Findings are nonspecific but could reflect congestive heart failure.       No evidence of pulmonary emboli.       Some paratracheal and small mediastinal lymph nodes.       Lesion posteriorly in the left kidney. Although this could reflect a hemorrhagic cyst, a complex solid and cystic mass is not excluded. Recommend follow-up ultrasound.       Low-attenuation zones in the bladder which could reflect debris.  This can be further evaluated with ultrasound as well     US Duplex lower extremities bilateral 7/28/2020:  No evidence to suggest deep venous thrombosis of the bilateral lower extremities. XR Chest portable:  Persistent, small bilateral pleural effusions with associated atelectasis and consolidation. Pending test results: Respiratory culture, pleural effusion pH/LDH/glucose/culture/gram stain/cytology    Consults:  1. Cardiology  2. Pulmonology  3. Gastroenterology    Procedures:  1. Thoracentesis    Condition at discharge: Stable    Disposition: SNF    Discharge Medications:  Current Discharge Medication List      START taking these medications    Details   apixaban (ELIQUIS) 2.5 MG TABS tablet Take 1 tablet by mouth 2 times daily  Qty: 60 tablet, Refills: 1      diclofenac sodium (VOLTAREN) 1 % GEL Apply 4 g topically 4 times daily as needed for Pain  Qty: 1 Tube, Refills: 1      furosemide (LASIX) 40 MG tablet Take 1 tablet by mouth daily  Qty: 60 tablet, Refills: 1      potassium chloride (KLOR-CON M) 20 MEQ extended release tablet Take 1 tablet by mouth 2 times daily (with meals)  Qty: 60 tablet, Refills: 3      sucralfate (CARAFATE) 1 GM tablet Take 1 tablet by mouth 4 times daily  Qty: 120 tablet, Refills: 1         CONTINUE these medications which have CHANGED    Details   rosuvastatin (CRESTOR) 5 MG tablet Take 1 tablet by mouth nightly Indications: takes 1/2 twice a week  Qty: 30 tablet, Refills: 1      metoprolol succinate (TOPROL XL) 25 MG extended release tablet Take 1.5 tablets by mouth 2 times daily  Qty: 30 tablet, Refills: 1         CONTINUE these medications which have NOT CHANGED    Details   esomeprazole (NEXIUM) 40 MG delayed release capsule Take 1 capsule by mouth every morning (before breakfast)  Qty: 90 capsule, Refills: 1      vitamin B-12 (CYANOCOBALAMIN) 1000 MCG tablet Take 1,000 mcg by mouth daily. folic acid (FOLVITE) 1 MG tablet Take 1 mg by mouth daily.       aspirin 81 MG EC tablet Take 81 mg by mouth daily. tamsulosin (FLOMAX) 0.4 MG capsule Take 0.4 mg by mouth daily. albuterol sulfate  (90 Base) MCG/ACT inhaler Inhale 2 puffs into the lungs 4 times daily as needed for Wheezing  Qty: 3 Inhaler, Refills: 1      CINNAMON PO Take 1,000 mg by mouth daily. salsalate (DISALCID) 750 MG tablet Take 750 mg by mouth 3 times daily. nitroGLYCERIN (NITROSTAT) 0.4 MG SL tablet Place 0.4 mg under the tongue every 5 minutes as needed for Chest pain. Coenzyme Q10 (COQ10 PO) Take 1 capsule by mouth daily. Omega-3 Fatty Acids (FISH OIL) 1000 MG CAPS Take 1,000 mg by mouth daily. STOP taking these medications       WARFARIN SODIUM PO Comments:   Reason for Stopping:               Activity: activity as tolerated  Diet: cardiac diet    Follow-up appointments: Follow-up With  Details  Why  Contact Info   Katia Chao MD  Schedule an appointment as soon as possible for a visit in 1 week  Hospital follow up  [address] [place] [address]  [phone]     Patient Instructions:  1. Start taking Eliquis 2.5 mg two times a day  2. Start taking Furosemide 40 mg daily  3. Start taking Sucralfate 1 g four times a day  4. Start using Voltaren gel to the left knee four times daily as needed for pain  5. Start taking Potassium Chloride 20 mEq two times a day with meals    [facility] Internal Medicine Resident Service    Activity as tolerated   Diet: low sodium  Please be compliant with your medications and take them as prescribed. Special Instructions:     Hospital Follow up:   Katia Chao MD  Schedule an appointment as soon as possible for a visit in 1 week  Hospital follow up  [address] [place] [address]  [phone]     Other Follow-Ups:    [address] [phone]     Arrange for a follow up appointment with your established Gastroenterologist (GI) physician within 1 week. Other than any new prescriptions given to you today, the list of home going meds on this After Visit Summary are based on information provided to us from you. This information, including the list, dose, and frequency of medications is only as accurate as the information you provided. If you have any questions or concerns about your home medications, please contact your Primary Care Physician for further clarification. PLEASE MAKE SURE PATIENT IS WEIGHED DAILY AND CALL THE PHYSICIAN WITH THE  PARAMETERS BELOW                 HEART FAILURE - CONGESTIVE HEART FAILURE  DISCHARGE INSTRUCTIONS:  GUIDELINES TO FOLLOW AT [facility] / Assisted Living       MEDICATIONS:  · Please notify the doctor if patient is not able to take their medications or if medications are being held for any reasons (such as low blood pressure ect.)  · Do not give the patient ibuprofen (Advil or Motrin), naproxen (Aleve) without talking to the doctor first. This could make their heart failure worse. WEIGHT MONITORING:  ·  Weigh patient every day in the morning after they void (If patient is able to stand, please get a standing weight.)  ·  Notify the doctor of a weight gain of 3 pounds or more in 1 day   OR  a total of 5 pounds or more in 1 week             DIET   Cardiac heart healthy diet- Low saturated / low trans fat, no added salt, caffeine restricted, Low sodium diet- no  more than 2,000mg (2 grams) of salt / sodium per day (which equals to a little less than  a teaspoon of salt)    · If patient is there for rehab and will be returning home in the near future; reinforce with the patient and the family to follow a low salt diet - avoid using salt at the table, avoid / limit use of canned soups, processed / packaged foods, salted snacks, olives and pickles.   Do not use a salt substitute without checking with the doctor. (Mrs. Dilshad Peoples is safe to use).        NOTIFY THE DOCTOR THE FIRST DAY OF ONSET OF ANY OF THESE   SYMPTOMS:  ·  Weight gain of 3 pounds or more in 1 day         OR 5 pounds or more in one week  · More shortness of breath  · More swelling in stomach, legs, ankles or feet  · Feeling more tired, No energy  · Dry hacky cough  · Dizziness  · More chest pain / discomfort  · Hard time breathing laying down    Memory MD Eduardo, PGY-1  2:24 PM 8/5/2020

## 2020-08-05 NOTE — PROGRESS NOTES
Pulmonary [address] [facility]                             Pulmonary Consult/Progress Note :              Consulting Physician:Bilateral Pleural effusion       Reason for Consultation:bilateral effusion   CC : SOB   HPI:   Doing better  No chest pain  Negative 6 L     PHYSICAL EXAMINATION:     VITAL SIGNS:  BP (!) 99/57   Pulse 99   Temp 99 °F (37.2 °C) (Temporal)   Resp 18   Ht 6' (1.829 m)   Wt 194 lb 4 oz (88.1 kg)   SpO2 94%   BMI 26.35 kg/m²   Wt Readings from Last 3 Encounters:   08/04/20 194 lb 4 oz (88.1 kg)   10/31/19 220 lb (99.8 kg)   10/14/19 224 lb (101.6 kg)     Temp Readings from Last 3 Encounters:   08/04/20 99 °F (37.2 °C) (Temporal)     TMAX:  BP Readings from Last 3 Encounters:   08/04/20 (!) 99/57   10/31/19 118/60   10/14/19 120/68     Pulse Readings from Last 3 Encounters:   08/04/20 99   10/31/19 78   10/14/19 100           INTAKE/OUTPUTS:  I/O last 3 completed shifts:   In: 720 [P.O.:720]  Out: 975 [Urine:975]    Intake/Output Summary (Last 24 hours) at 8/4/2020 2248  Last data filed at 8/4/2020 2117  Gross per 24 hour   Intake 600 ml   Output 400 ml   Net 200 ml       General Appearance: alert and oriented to person, place and time, well-developed and   well-nourished, in no acute distress   Eyes: pupils equal, round, and reactive to light, extraocular eye movements intact, conjunctivae normal and sclera anicteric   Neck: neck supple and non tender without mass, no thyromegaly, no thyroid nodules and no cervical adenopathy   Pulmonary/Chest:decrease braeth sound bilateral    Cardiovascular: normal rate, regular rhythm, normal S1 and S2, no murmurs, rubs, clicks or gallops, distal pulses intact, no carotid bruits, no murmurs, no gallops, no carotid bruits and no JVD   Abdomen: obese, soft, non-tender, non-distended, normal bowel sounds, no masses or organomegaly   Extremities no edema   Musculoskeletal: normal range of motion, no joint swelling, deformity or tenderness   Neurologic: reflexes normal and symmetric, no cranial nerve deficit noted    LABS/IMAGING:    CBC:  Lab Results   Component Value Date    WBC 4.9 08/04/2020    HGB 11.6 (L) 08/04/2020    HCT 36.9 (L) 08/04/2020    MCV 87.6 08/04/2020     08/04/2020    LYMPHOPCT 23.7 08/04/2020    RBC 4.21 08/04/2020    MCH 27.6 08/04/2020    MCHC 31.4 (L) 08/04/2020    RDW 14.5 08/04/2020    NEUTOPHILPCT 57.5 08/04/2020    MONOPCT 13.1 (H) 08/04/2020    BASOPCT 0.6 08/04/2020    NEUTROABS 2.81 08/04/2020    LYMPHSABS 1.16 (L) 08/04/2020    MONOSABS 0.64 08/04/2020    EOSABS 0.23 08/04/2020    BASOSABS 0.03 08/04/2020       Recent Labs     08/04/20  0615 08/03/20 0642 08/02/20  0610   WBC 4.9 5.0 5.9   HGB 11.6* 11.5* 11.4*   HCT 36.9* 36.7* 35.7*   MCV 87.6 87.6 86.7    194 198       BMP:   Recent Labs     08/02/20  0610 08/03/20  0642 08/04/20  0615    138 137   K 4.7 4.8 4.2   CL 99 101 98   CO2 27 27 28   PHOS  --   --  3.0   BUN 40* 41* 39*   CREATININE 1.6* 1.5* 1.3*       MG:   Lab Results   Component Value Date    MG 2.0 08/04/2020     Ca/Phos:   Lab Results   Component Value Date    CALCIUM 10.1 08/04/2020    PHOS 3.0 08/04/2020     Amylase: No results found for: AMYLASE  Lipase: No results found for: LIPASE  LIVER PROFILE:   Recent Labs     08/02/20  0610 08/03/20  0642 08/04/20  0615   AST 15 16 17   ALT 11 11 12   BILITOT 0.9 0.8 0.8   ALKPHOS 70 72 73       PT/INR:   No results for input(s): PROTIME, INR in the last 72 hours. APTT:   No results for input(s): APTT in the last 72 hours. Cardiac Enzymes:  Lab Results   Component Value Date    CKMB 2.1 07/28/2020    TROPONINI 0.01 07/28/2020                   PROBLEM LIST:  Patient Active Problem List   Diagnosis    [facility]    Anemia    Lymphoma ([facility]    Hyperlipidemia    Hypertension    CAD (coronary artery disease)    Presence of drug coated stent in LAD coronary artery    Obesity (BMI 30.0-34. 9)  Congestive heart failure (HCC)    Acute diastolic heart failure with preserved ejection fraction (HCC)    Bilateral pleural effusion    Stage 3 chronic kidney disease (HCC)    Hemoptysis               ASSESSMENT:  1.) Bilateral  pleural effusion Right more than left   2.)Medistinal adenopathy   3. )CHF   4.)Possible COPD   5. )VESTA/      PLAN:  CXR much better  Follow up CXR as OP  Doing great with diuresis ,negative 6 L  In 1-2 weeks if fluid got worse will drain  Pending sputum   Discharge plan  ,may need O2 3 L on discharge       Elia [facility]  Pulmonary&Critical Care Medicine   Director of [address] [place] [address] Director of [address] [place] [address]    Jaquelin Garduno    NOTE: This report was transcribed using voice recognition software. Every effort was made to ensure accuracy; however, inadvertent computerized transcription errors may be present.

## 2020-08-05 NOTE — CARE COORDINATION
I was notified by previous  that the insurance approved the patient to go to \[facility]\""S. at [phone]. Approval# [id number] August 5-9 with NRD Aug 10 to fax# [phone] and phone# [phone]. Charge nurse notified and is checking for discharge. Last negative Covid-19 test was on Wednesday 7/29. Please call [employer] phone#[phone] to set up transportation once discharge order goes in.   Zenaida Bennett RN CM

## 2020-08-05 NOTE — CARE COORDINATION
Discharge order noted. [address] is not in network with the patient's insurance. Daughter Fidelia Juarez informed. She gave permission to call [facility] and she will pay privately. Transportation set up via [employer] for 5 pm today for a cost of $107.25. Charge nurse, RN, liaison and patient's daughter all notified. Daughter paid privately. [facility] Ambulated form in envelope in soft chart. Last negative Covid-19 test was on Wednesday 7/29.   Lynn Castañeda RN CM

## 2020-08-05 NOTE — PROGRESS NOTES
Occupational Therapy  Patient treatment attempted this PM.  Patient declined session requesting to rest prior to D/C today. Wishes respected.                                                  Kary MERIDA/CHAPITO [id number]

## 2020-09-22 LAB
AFB CULTURE (MYCOBACTERIA): NORMAL
AFB SMEAR: NORMAL

## 2023-02-20 NOTE — PROGRESS NOTES
Patanol/olapatadine or Zaditor eye drops (allergy)    [facility]  Internal Medicine Residency Program  Progress Note - House Team 2    Patient:  Fior Collier 80 y.o. male MRN: [id number]     Date of Service: 7/30/2020     CC: Patient shortness of breath and bilateral leg edema. Overnight events: No overnight event reported    Subjective   80year-old male with past medical history  has a past medical history of Anemia, Atrial fibrillation, Hyperlipidemia, Hypertension, Lymphoma , Renal failure, acute, and Temporal arteritis presented to the ED with shortness of breath and bilateral leg edema. Patient is seen and examined the same in the morning patient is seen shortness of breath and chest tightness slight yesterday. Patient passed 1 stool admission. Patient denies any fever chills palpitation abdominal pain nausea vomiting or any extraordinary urinary symptoms. patient is scheduled for thoracentesis with Dr. Karson Tobin today. Objective     Physical Exam:  · Vitals: BP (!) 123/59   Pulse 100   Temp 97.5 °F (36.4 °C) (Temporal)   Resp 16   Ht 6' (1.829 m)   Wt 206 lb 7 oz (93.6 kg)   SpO2 96%   BMI 28.00 kg/m²     · I & O - 24hr: No intake/output data recorded. · General Appearance: alert, appears stated age and cooperative  · HEENT:  Head: Normocephalic, no lesions, without obvious abnormality. · Neck: no adenopathy, no carotid bruit, no JVD, supple, symmetrical, trachea midline and thyroid not enlarged, symmetric, no tenderness/mass/nodules  · Lung: clear to auscultation bilaterally  · Heart: regular rate and rhythm, S1, S2 normal, no murmur, click, rub or gallop  · Abdomen: soft, non-tender; bowel sounds normal; no masses,  no organomegaly  · Extremities:  extremities normal, atraumatic, no cyanosis or edema  · Musculokeletal: No joint swelling, no muscle tenderness. ROM normal in all joints of extremities.    · Neurologic: Mental status: Alert, oriented, thought content appropriate  Subject  Pertinent Labs & Imaging Studies rosemary  CBC:   Lab Results   Component Value Date    WBC 4.9 07/30/2020    RBC 4.53 07/30/2020    HGB 12.7 07/30/2020    HCT 39.2 07/30/2020    MCV 86.5 07/30/2020    MCH 28.0 07/30/2020    MCHC 32.4 07/30/2020    RDW 15.5 07/30/2020     07/30/2020    MPV 9.4 07/30/2020     CBC with Differential:    Lab Results   Component Value Date    WBC 4.9 07/30/2020    RBC 4.53 07/30/2020    HGB 12.7 07/30/2020    HCT 39.2 07/30/2020     07/30/2020    MCV 86.5 07/30/2020    MCH 28.0 07/30/2020    MCHC 32.4 07/30/2020    RDW 15.5 07/30/2020    LYMPHOPCT 24.2 07/30/2020    MONOPCT 12.6 07/30/2020    BASOPCT 0.4 07/30/2020    MONOSABS 0.62 07/30/2020    LYMPHSABS 1.19 07/30/2020    EOSABS 0.24 07/30/2020    BASOSABS 0.02 07/30/2020     WBC:    Lab Results   Component Value Date    WBC 4.9 07/30/2020     Platelets:    Lab Results   Component Value Date     07/30/2020     Hemoglobin/Hematocrit:    Lab Results   Component Value Date    HGB 12.7 07/30/2020    HCT 39.2 07/30/2020     BMP:    Lab Results   Component Value Date     07/30/2020    K 4.3 07/30/2020    CL 98 07/30/2020    CO2 25 07/30/2020    BUN 28 07/30/2020    LABALBU 3.6 07/30/2020    CREATININE 1.4 07/30/2020    CALCIUM 10.0 07/30/2020    GFRAA 58 07/30/2020    LABGLOM 48 07/30/2020    GLUCOSE 89 07/30/2020     Warfarin PT/INR:  No components found for: PTPATWAR, PTINRWAR  PTT:    Lab Results   Component Value Date    APTT 29.5 07/27/2020   [APTT}    Resident's Assessment and Plan     Dilshad Patel is a 80 y.o. male with PHMx of Afib, HTN, HLD, anemia, lymphoma presented to the ED complains of SOB and legs edema. CTA chest significant for bilateral pleural effusion, no evidence of PE. Troponin negative, Pro BNP elevated 4687. Patient has CXR today shows bilateral pleural effusion, ECHO:  Normal left ventricle size and systolic function. Ejection fraction is visually estimated at 60-65%. The left atrium is moderately dilated.  US dup lower extremities shows no evidence to suggest deep venous thrombosis of the bilateral lower extremities.      1. CHF exacerbation with bilateral pleural effusion  - Patient has Hx of HTN, Afib with cardioversion present to the Ed with SOB, bilateral lower legs swelling.   - Bilateral pleural effusion on physical exam and on CTA chest.   - Pro BNP on admission is elevated ( 4876)   - EKG shows no ST changes. Patient has troponin negative x3  - CTA chest show no evidence of pulmonary emboli  - CXR indicate mild to moderate pleural effusion on the right side, small on left side, which is improved from yesterday. - ECHO: Normal LV function with preserved EF ( 60-65%)  -Episode of hemoptysis today      = Continue lasix 20 mg BID  = Thoracentesis done today by Dr. Darina Soulier  = Monitor vitals,I/O  = Check CXR, monitor chest tube drainage. 2. Afib RVR    - Patient has BP of 101/54mmHg, pulse 115 this morning.  - Denies chest pain, dizziness, palpitation, headache.   - Patient is on lasix 20 mg, urine out put 2L/24h. -Patient off Eliquis    =  ml Normal saline infusion.  = Reduce home dose of diltiazem from 180 mg to 60 mg twice daily  = Currently on Toprol 25mg BID  = Follow vitals, BP, spO2.    3. Community acquired pneumonia    - Patient presents with symptoms of cough up yellow sputum, SOB, patient has low fever at home;    physical exam and imaging shows bilateral pleural effusion.  - CBC shows WBC in normal range( 5.0). - Respiratory panel result negative, waiting for blood and res culture. - Start antibiotics according to the result. - Follow up vitals.   - Hold eliquis and Pulmonology consulted regarding hemoptysis and pleural effusion.  - Follow up with Pulmonology     4. CKD stage III    - Creatinine 1.4   - Plan: Continue follow BMP, I/O.      5. Black starry stools    - Patient reports having black starry stools, CBC shows mild normocytic anemia  - Plan: POCT occult blood stools, Order iron studies.    -